# Patient Record
Sex: FEMALE | Race: BLACK OR AFRICAN AMERICAN | NOT HISPANIC OR LATINO | Employment: UNEMPLOYED | URBAN - METROPOLITAN AREA
[De-identification: names, ages, dates, MRNs, and addresses within clinical notes are randomized per-mention and may not be internally consistent; named-entity substitution may affect disease eponyms.]

---

## 2017-02-18 ENCOUNTER — HOSPITAL ENCOUNTER (EMERGENCY)
Facility: CLINIC | Age: 32
Discharge: HOME | End: 2017-02-18
Attending: EMERGENCY MEDICINE | Admitting: EMERGENCY MEDICINE
Payer: MEDICAID

## 2017-02-18 ENCOUNTER — APPOINTMENT (OUTPATIENT)
Dept: GENERAL RADIOLOGY | Facility: CLINIC | Age: 32
End: 2017-02-18
Attending: EMERGENCY MEDICINE
Payer: MEDICAID

## 2017-02-18 VITALS
SYSTOLIC BLOOD PRESSURE: 130 MMHG | TEMPERATURE: 98.7 F | DIASTOLIC BLOOD PRESSURE: 72 MMHG | HEART RATE: 72 BPM | RESPIRATION RATE: 18 BRPM | OXYGEN SATURATION: 97 %

## 2017-02-18 DIAGNOSIS — J45.901 REACTIVE AIRWAY DISEASE, UNSPECIFIED ASTHMA SEVERITY, WITH ACUTE EXACERBATION: ICD-10-CM

## 2017-02-18 DIAGNOSIS — J20.9 ACUTE BRONCHITIS, UNSPECIFIED ORGANISM: ICD-10-CM

## 2017-02-18 LAB
FLUAV+FLUBV AG SPEC QL: NEGATIVE
FLUAV+FLUBV AG SPEC QL: NORMAL
SPECIMEN SOURCE: NORMAL

## 2017-02-18 PROCEDURE — 40000275 ZZH STATISTIC RCP TIME EA 10 MIN

## 2017-02-18 PROCEDURE — 25000125 ZZHC RX 250: Performed by: EMERGENCY MEDICINE

## 2017-02-18 PROCEDURE — 87804 INFLUENZA ASSAY W/OPTIC: CPT | Performed by: EMERGENCY MEDICINE

## 2017-02-18 PROCEDURE — 94640 AIRWAY INHALATION TREATMENT: CPT

## 2017-02-18 PROCEDURE — 99284 EMERGENCY DEPT VISIT MOD MDM: CPT | Mod: 25

## 2017-02-18 PROCEDURE — 71020 XR CHEST 2 VW: CPT

## 2017-02-18 RX ORDER — PREDNISONE 20 MG/1
TABLET ORAL
Qty: 10 TABLET | Refills: 0 | Status: ON HOLD | OUTPATIENT
Start: 2017-02-19 | End: 2018-04-28

## 2017-02-18 RX ORDER — IPRATROPIUM BROMIDE AND ALBUTEROL SULFATE 2.5; .5 MG/3ML; MG/3ML
6 SOLUTION RESPIRATORY (INHALATION) ONCE
Status: COMPLETED | OUTPATIENT
Start: 2017-02-18 | End: 2017-02-18

## 2017-02-18 RX ORDER — ALBUTEROL SULFATE 90 UG/1
2 AEROSOL, METERED RESPIRATORY (INHALATION) EVERY 4 HOURS PRN
Qty: 1 INHALER | Refills: 0 | Status: SHIPPED | OUTPATIENT
Start: 2017-02-18 | End: 2019-11-07

## 2017-02-18 RX ORDER — PREDNISONE 20 MG/1
60 TABLET ORAL ONCE
Status: COMPLETED | OUTPATIENT
Start: 2017-02-18 | End: 2017-02-18

## 2017-02-18 RX ADMIN — IPRATROPIUM BROMIDE AND ALBUTEROL SULFATE 6 ML: .5; 3 SOLUTION RESPIRATORY (INHALATION) at 08:10

## 2017-02-18 RX ADMIN — PREDNISONE 60 MG: 20 TABLET ORAL at 08:02

## 2017-02-18 ASSESSMENT — ENCOUNTER SYMPTOMS
COUGH: 1
SHORTNESS OF BREATH: 1
WHEEZING: 1

## 2017-02-18 NOTE — ED AVS SNAPSHOT
[facility] Emergency Department    [address]    [facility] [address]    Phone:  [phone]    Fax:  [phone]                                       Tea Miles   MRN: [id number]    Department:  [facility] Emergency Department   Date of Visit:  2/18/2017           After Visit Summary Signature Page     I have received my discharge instructions, and my questions have been answered. I have discussed any challenges I see with this plan with the nurse or doctor.    ..........................................................................................................................................  Patient/Patient Representative Signature      ..........................................................................................................................................  Patient Representative Print Name and Relationship to Patient    ..................................................               ................................................  Date                                            Time    ..........................................................................................................................................  Reviewed by Signature/Title    ...................................................              ..............................................  Date                                                            Time

## 2017-02-18 NOTE — ED AVS SNAPSHOT
[facility] Emergency Department    [address] [place] [address]    Phone:  [phone]    Fax:  [phone]                                       Tea Miles   MRN: [id number]    Department:  [facility] Emergency Department   Date of Visit:  2/18/2017           Patient Information     Date Of Birth          1985        Your diagnoses for this visit were:     Acute bronchitis, unspecified organism     Reactive airway disease, unspecified asthma severity, with acute exacerbation        You were seen by Aaron Andrew MD.      Follow-up Information     Follow up with Clinic, [facility]. Schedule an appointment as soon as possible for a visit in 2 days.    Contact information:    [address] [facility] [address]  [phone]          Discharge Instructions       Discharge Instructions  Bronchitis, Pneumonia, Bronchospasm    You were seen today for a chest infection or inflammation. If your doctor decided this was due to a bacterial infection, you may need an antibiotic. Sometimes these are caused by a virus, and then an antibiotic will not help.     Return to the Emergency Department if:    Your breathing gets much worse.    You are very weak, or feel much more ill.    You develop new symptoms, such as chest pain.    You cough up blood.    You are vomiting enough that you can t keep fluids or your medicine down.    What can I do to help myself?    Fill any prescriptions the doctor gave you and take them right away--especially antibiotics. Be sure to finish the whole antibiotic prescription.    You may be given a prescription for an inhaler, which can help loosen tight air passages.  Use this as needed, but not more often than directed. Inhalers work much better when used with a spacer.     You may be given a prescription for a steroid to reduce inflammation. Used long-term, these can have many serious side effects, but for short  "courses these do not happen. You may notice restlessness or increased appetite.        You may use non-prescription cough or cold medicines. Cough medicines may help, but don t make the cough go away completely.     Avoid smoke, because this can make your symptoms worse. If you smoke, this may be a good time to quit! Consider using nicotine lozenges, gum, or patches to reduce cravings.     If you have a fever, Tylenol  (acetaminophen), Motrin  (ibuprofen), or Advil  (ibuprofen) may help bring fever down and may help you feel more comfortable. Be sure to read and follow the package directions, and ask your doctor if you have questions.    Be sure to get your flu shot each year.  The pneumonia shot can help prevent pneumonia.  Probiotics: If you have been given an antibiotic, you may want to also take a probiotic pill or eat yogurt with live cultures. Probiotics have \"good bacteria\" to help your intestines stay healthy. Studies have shown that probiotics help prevent diarrhea and other intestine problems (including C. diff infection) when you take antibiotics. You can buy these without a prescription in the pharmacy section of the store.     If your doctor has told you to follow-up at your clinic, be sure to call right away and go to your appointment.  If there is any problem with keeping your appointment, call your doctor or return to the Emergency Department.    If you were given a prescription for medicine here today, be sure to read all of the information (including the package insert) that comes with your prescription.  This will include important information about the medicine, its side effects, and any warnings that you need to know about.  The pharmacist who fills the prescription can provide more information and answer questions you may have about the medicine.  If you have questions or concerns that the pharmacist cannot address, please call or return to the Emergency Department.         Discharge " References/Attachments     BRONCHOSPASM (ADULT) (ENGLISH)      24 Hour Appointment Hotline       To make an appointment at any [facility] call [phone] ([phone]). If you don't have a family doctor or clinic, we will help you find one. [facility] clinics are conveniently located to serve the needs of you and your family.             Review of your medicines      START taking        Dose / Directions Last dose taken    predniSONE 20 MG tablet   Commonly known as:  DELTASONE   Quantity:  10 tablet   Start taking on:  2/19/2017        Take two tablets (= 40mg) each day for 5 (five) days   Refills:  0          CONTINUE these medicines which may have CHANGED, or have new prescriptions. If we are uncertain of the size of tablets/capsules you have at home, strength may be listed as something that might have changed.        Dose / Directions Last dose taken    * albuterol 108 (90 BASE) MCG/ACT Inhaler   Commonly known as:  PROAIR HFA/PROVENTIL HFA/VENTOLIN HFA   Dose:  1-2 puff   What changed:  Another medication with the same name was added. Make sure you understand how and when to take each.   Quantity:  1 Inhaler        Inhale 1-2 puffs into the lungs every 4 hours as needed for shortness of breath / dyspnea or wheezing   Refills:  0        * albuterol 108 (90 BASE) MCG/ACT Inhaler   Commonly known as:  PROAIR HFA/PROVENTIL HFA/VENTOLIN HFA   Dose:  2 puff   What changed:  You were already taking a medication with the same name, and this prescription was added. Make sure you understand how and when to take each.   Quantity:  1 Inhaler        Inhale 2 puffs into the lungs every 4 hours as needed for shortness of breath / dyspnea or wheezing   Refills:  0        * Notice:  This list has 2 medication(s) that are the same as other medications prescribed for you. Read the directions carefully, and ask your doctor or other care provider to review them with you.      Our records show that you are taking the  medicines listed below. If these are incorrect, please call your family doctor or clinic.        Dose / Directions Last dose taken    ferrous sulfate 142 (45 FE) MG Tbcr   Commonly known as:  SLO-FE   Dose:  142 mg   Quantity:  30 tablet        Take 1 tablet (142 mg) by mouth daily   Refills:  prn        fluticasone 110 MCG/ACT Inhaler   Commonly known as:  FLOVENT HFA   Dose:  2 puff   Quantity:  1 Inhaler        Inhale 2 puffs into the lungs 2 times daily   Refills:  1        GABAPENTIN PO        Refills:  0        ibuprofen 600 MG tablet   Commonly known as:  ADVIL/MOTRIN   Dose:  600 mg   Quantity:  30 tablet        Take 1 tablet (600 mg) by mouth every 8 hours as needed for moderate pain   Refills:  0        OMEPRAZOLE PO        Refills:  0        PRENATAL VITAMINS PO   Dose:  1 tablet        Take 1 tablet by mouth daily.   Refills:  0        ZOFRAN PO        Refills:  0                Prescriptions were sent or printed at these locations (2 Prescriptions)                   Other Prescriptions                Printed at Department/Unit printer (2 of 2)         predniSONE (DELTASONE) 20 MG tablet               albuterol (PROAIR HFA/PROVENTIL HFA/VENTOLIN HFA) 108 (90 BASE) MCG/ACT Inhaler                Procedures and tests performed during your visit     Chest XR,  PA & LAT    Influenza A/B antigen      Orders Needing Specimen Collection     None      Pending Results     No orders found from 2/16/2017 to 2/19/2017.            Pending Culture Results     No orders found from 2/16/2017 to 2/19/2017.             Test Results from your hospital stay     2/18/2017  8:49 AM - Interface, [employer] Results      Component Results     Component Value Ref Range & Units Status    Influenza A/B Agn Specimen Nasal  Final    Influenza A Negative NEG Final    Influenza B  NEG Final    Negative   Test results must be correlated with clinical data. If necessary, results   should be confirmed by a molecular assay or viral  culture.           2/18/2017  8:43 AM - Interface, [employer] Ib      Narrative     XR CHEST 2 VW 2/18/2017 8:38 AM     HISTORY: cough, dyspnea        Impression     IMPRESSION: Negative exam.    ESTHER ARGUETA MD                Clinical Quality Measure: Blood Pressure Screening     Your blood pressure was checked while you were in the emergency department today. The last reading we obtained was  BP: 135/74 . Please read the guidelines below about what these numbers mean and what you should do about them.  If your systolic blood pressure (the top number) is less than 120 and your diastolic blood pressure (the bottom number) is less than 80, then your blood pressure is normal. There is nothing more that you need to do about it.  If your systolic blood pressure (the top number) is 120-139 or your diastolic blood pressure (the bottom number) is 80-89, your blood pressure may be higher than it should be. You should have your blood pressure rechecked within a year by a primary care provider.  If your systolic blood pressure (the top number) is 140 or greater or your diastolic blood pressure (the bottom number) is 90 or greater, you may have high blood pressure. High blood pressure is treatable, but if left untreated over time it can put you at risk for heart attack, stroke, or kidney failure. You should have your blood pressure rechecked by a primary care provider within the next 4 weeks.  If your provider in the emergency department today gave you specific instructions to follow-up with your doctor or provider even sooner than that, you should follow that instruction and not wait for up to 4 weeks for your follow-up visit.        Thank you for choosing [facility]       Thank you for choosing [facility] for your care. Our goal is always to provide you with excellent care. Hearing back from our patients is one way we can continue to improve our services. Please take a few minutes to complete the written survey that you may  "receive in the mail after you visit with us. Thank you!        [employer] Information     [employer] lets you send messages to your doctor, view your test results, renew your prescriptions, schedule appointments and more. To sign up, go to [email or web address] . Click on \"Log in\" on the left side of the screen, which will take you to the Welcome page. Then click on \"Sign up Now\" on the right side of the page.     You will be asked to enter the access code listed below, as well as some personal information. Please follow the directions to create your username and password.     Your access code is: [email or web address]  Expires: 2017  9:15 AM     Your access code will  in 90 days. If you need help or a new code, please call your [place] clinic or [phone].        Care EveryWhere ID     This is your Care EveryWhere ID. This could be used by other organizations to access your [place] medical records  [id number]        After Visit Summary       This is your record. Keep this with you and show to your community pharmacist(s) and doctor(s) at your next visit.                  "

## 2017-02-18 NOTE — ED NOTES
Patient presents with a cough which started this morning. She states that she is having SOB as well. Patient was given one Duo neb in route. Denies any fevers or pain. ABC intact without need for intervention.

## 2017-02-18 NOTE — ED NOTES
Bed: ED15  Expected date: 2/18/17  Expected time: 7:41 AM  Means of arrival: Ambulance  Comments:  HE- 32y F, cough and wheezing, duo neb given

## 2017-07-31 ENCOUNTER — OFFICE VISIT (OUTPATIENT)
Dept: URGENT CARE | Facility: URGENT CARE | Age: 32
End: 2017-07-31
Payer: COMMERCIAL

## 2017-07-31 VITALS
DIASTOLIC BLOOD PRESSURE: 58 MMHG | BODY MASS INDEX: 26.22 KG/M2 | RESPIRATION RATE: 12 BRPM | OXYGEN SATURATION: 100 % | TEMPERATURE: 98.8 F | SYSTOLIC BLOOD PRESSURE: 100 MMHG | WEIGHT: 160 LBS | HEART RATE: 64 BPM

## 2017-07-31 DIAGNOSIS — R30.0 DYSURIA: ICD-10-CM

## 2017-07-31 DIAGNOSIS — B96.89 BV (BACTERIAL VAGINOSIS): Primary | ICD-10-CM

## 2017-07-31 DIAGNOSIS — N76.0 BV (BACTERIAL VAGINOSIS): Primary | ICD-10-CM

## 2017-07-31 DIAGNOSIS — N92.6 LATE MENSES: ICD-10-CM

## 2017-07-31 LAB
ALBUMIN UR-MCNC: NEGATIVE MG/DL
APPEARANCE UR: CLEAR
BETA HCG QUAL IFA URINE: NEGATIVE
BILIRUB UR QL STRIP: NEGATIVE
COLOR UR AUTO: YELLOW
GLUCOSE UR STRIP-MCNC: NEGATIVE MG/DL
HGB UR QL STRIP: NEGATIVE
KETONES UR STRIP-MCNC: NEGATIVE MG/DL
LEUKOCYTE ESTERASE UR QL STRIP: NEGATIVE
MICRO REPORT STATUS: ABNORMAL
NITRATE UR QL: NEGATIVE
PH UR STRIP: 7 PH (ref 5–7)
SP GR UR STRIP: 1.01 (ref 1–1.03)
SPECIMEN SOURCE: ABNORMAL
URN SPEC COLLECT METH UR: NORMAL
UROBILINOGEN UR STRIP-ACNC: 0.2 EU/DL (ref 0.2–1)
WET PREP SPEC: ABNORMAL

## 2017-07-31 PROCEDURE — 87210 SMEAR WET MOUNT SALINE/INK: CPT | Performed by: PHYSICIAN ASSISTANT

## 2017-07-31 PROCEDURE — 84703 CHORIONIC GONADOTROPIN ASSAY: CPT | Performed by: PHYSICIAN ASSISTANT

## 2017-07-31 PROCEDURE — 99213 OFFICE O/P EST LOW 20 MIN: CPT | Performed by: PHYSICIAN ASSISTANT

## 2017-07-31 PROCEDURE — 81003 URINALYSIS AUTO W/O SCOPE: CPT | Performed by: PHYSICIAN ASSISTANT

## 2017-07-31 RX ORDER — METRONIDAZOLE 500 MG/1
500 TABLET ORAL 2 TIMES DAILY
Qty: 14 TABLET | Refills: 0 | Status: ON HOLD | OUTPATIENT
Start: 2017-07-31 | End: 2018-04-28

## 2017-07-31 NOTE — MR AVS SNAPSHOT
"              After Visit Summary   2017    Kayla Howell    MRN: [id number]           Patient Information     Date Of Birth          1985        Visit Information        Provider Department      2017 2:50 PM Regina García PA-C [facility]        Today's Diagnoses     BV (bacterial vaginosis)    -  1    Late menses        Dysuria           Follow-ups after your visit        Who to contact     If you have questions or need follow up information about today's clinic visit or your schedule please contact [facility] directly at [phone].  Normal or non-critical lab and imaging results will be communicated to you by [employer], letter or phone within 4 business days after the clinic has received the results. If you do not hear from us within 7 days, please contact the clinic through [employer] or phone. If you have a critical or abnormal lab result, we will notify you by phone as soon as possible.  Submit refill requests through [employer] or call your pharmacy and they will forward the refill request to us. Please allow 3 business days for your refill to be completed.          Additional Information About Your Visit        MyChart Information     [employer] lets you send messages to your doctor, view your test results, renew your prescriptions, schedule appointments and more. To sign up, go to [email or web address] . Click on \"Log in\" on the left side of the screen, which will take you to the Welcome page. Then click on \"Sign up Now\" on the right side of the page.     You will be asked to enter the access code listed below, as well as some personal information. Please follow the directions to create your username and password.     Your access code is: [email or web address]  Expires: 2017  4:02 PM     Your access code will  in 90 days. If you need help or a new code, please call your [place] clinic or [phone].        Care EveryWhere ID     This is your Care " EveryWhere ID. This could be used by other organizations to access your [place] medical records  [id number]        Your Vitals Were     Pulse Temperature Respirations Pulse Oximetry BMI (Body Mass Index)       64 98.8  F (37.1  C) (Oral) 12 100% 26.22 kg/m2        Blood Pressure from Last 3 Encounters:   07/31/17 100/58   02/18/17 130/72   11/09/16 100/70    Weight from Last 3 Encounters:   07/31/17 160 lb (72.6 kg)   11/09/16 206 lb 11.2 oz (93.8 kg)   03/07/16 207 lb (93.9 kg)              We Performed the Following     *UA reflex to Microscopic and Culture ([place] and [facility] (except [place] and [place])     Beta HCG qual IFA urine     Wet prep          Today's Medication Changes          These changes are accurate as of: 7/31/17 11:59 PM.  If you have any questions, ask your nurse or doctor.               Start taking these medicines.        Dose/Directions    metroNIDAZOLE 500 MG tablet   Commonly known as:  FLAGYL   Used for:  BV (bacterial vaginosis)   Started by:  Regina García PA-C        Dose:  500 mg   Take 1 tablet (500 mg) by mouth 2 times daily   Quantity:  14 tablet   Refills:  0            Where to get your medicines      These medications were sent to [facility] #[id number] - [place], [place] - [address] [place] [address] AT [address] [place], [place] [address]     Phone:  [phone]     metroNIDAZOLE 500 MG tablet                Primary Care Provider Office Phone # Fax #    Reba [facility] [phone] [address] [facility] [address]        Equal Access to Services     PETRA WADE AH: Hadmariajose santiago Soshital, waaxda luqadaha, qaybta kaalmada andrew, cristina paredes. So [facility] [phone].    ATENCIÓN: Si habla español, tiene a porter disposición servicios gratuitos de asistencia lingüística. Negrito mak [phone].    We comply with applicable federal civil rights laws and [place]  laws. We do not discriminate on the basis of race, color, national origin, age, disability sex, sexual orientation or gender identity.            Thank you!     Thank you for choosing [facility]  for your care. Our goal is always to provide you with excellent care. Hearing back from our patients is one way we can continue to improve our services. Please take a few minutes to complete the written survey that you may receive in the mail after your visit with us. Thank you!             Your Updated Medication List - Protect others around you: Learn how to safely use, store and throw away your medicines at www.disposemymeds.org.          This list is accurate as of: 7/31/17 11:59 PM.  Always use your most recent med list.                   Brand Name Dispense Instructions for use Diagnosis    * albuterol 108 (90 BASE) MCG/ACT Inhaler    PROAIR HFA/PROVENTIL HFA/VENTOLIN HFA    1 Inhaler    Inhale 1-2 puffs into the lungs every 4 hours as needed for shortness of breath / dyspnea or wheezing    Bronchitis       * albuterol 108 (90 BASE) MCG/ACT Inhaler    PROAIR HFA/PROVENTIL HFA/VENTOLIN HFA    1 Inhaler    Inhale 2 puffs into the lungs every 4 hours as needed for shortness of breath / dyspnea or wheezing        ferrous sulfate 142 (45 FE) MG Tbcr    SLO-FE    30 tablet    Take 1 tablet (142 mg) by mouth daily    LISA (iron deficiency anaemia)       fluticasone 110 MCG/ACT Inhaler    FLOVENT HFA    1 Inhaler    Inhale 2 puffs into the lungs 2 times daily    Acute bronchitis       GABAPENTIN PO           ibuprofen 600 MG tablet    ADVIL/MOTRIN    30 tablet    Take 1 tablet (600 mg) by mouth every 8 hours as needed for moderate pain        metroNIDAZOLE 500 MG tablet    FLAGYL    14 tablet    Take 1 tablet (500 mg) by mouth 2 times daily    BV (bacterial vaginosis)       OMEPRAZOLE PO           predniSONE 20 MG tablet    DELTASONE    10 tablet    Take two tablets (= 40mg) each day for 5 (five) days         PRENATAL VITAMINS PO      Take 1 tablet by mouth daily.        ZOFRAN PO           * Notice:  This list has 2 medication(s) that are the same as other medications prescribed for you. Read the directions carefully, and ask your doctor or other care provider to review them with you.

## 2017-07-31 NOTE — NURSING NOTE
"Chief Complaint   Patient presents with     Urgent Care     Vomiting     Pt has been vomiting x 1 week approx 3/day.  She thinks she has possibley had a fever off and on.  No period x 2 months and may be pregnant.       Initial /58 (BP Location: Right arm, Patient Position: Sitting, Cuff Size: Adult Regular)  Pulse 64  Temp 98.8  F (37.1  C) (Oral)  Resp 12  Wt 160 lb (72.6 kg)  SpO2 100%  BMI 26.22 kg/m2 Estimated body mass index is 26.22 kg/(m^2) as calculated from the following:    Height as of 3/7/16: 5' 5.5\" (1.664 m).    Weight as of this encounter: 160 lb (72.6 kg)..  BP completed using cuff size: regular  Kayleen Marques R.N.    "

## 2017-08-29 NOTE — PROGRESS NOTES
SUBJECTIVE:   32 year old female complains of clear vaginal discharge for 1 weeks.  Denies abnormal vaginal bleeding or significant pelvic pain or  fever. No UTI symptoms. Denies history of known exposure to STD. Denies dyspareunia.  States that has been vomiting on and off for 1 week and wonders about fever.  Lower abdominal pressure but no pain . Denies spotting.  States that no menstrual cycle for 2 months and wonders if pregnant.  Eating and drinking well and normal urine output.      Current Outpatient Prescriptions   Medication     metroNIDAZOLE (FLAGYL) 500 MG tablet     GABAPENTIN PO     predniSONE (DELTASONE) 20 MG tablet     albuterol (PROAIR HFA/PROVENTIL HFA/VENTOLIN HFA) 108 (90 BASE) MCG/ACT Inhaler     Ondansetron HCl (ZOFRAN PO)     ibuprofen (ADVIL,MOTRIN) 600 MG tablet     OMEPRAZOLE PO     ferrous sulfate (SLO-FE) 142 (45 FE) MG TBCR     albuterol (PROAIR HFA, PROVENTIL HFA, VENTOLIN HFA) 108 (90 BASE) MCG/ACT inhaler     fluticasone (FLOVENT HFA) 110 MCG/ACT inhaler     PRENATAL VITAMINS PO     No current facility-administered medications for this visit.      She is a non smoker    Has other children.      No history of abdominal issues or surgeries.        LMP - June 10th    OBJECTIVE:   She appears well, afebrile.  Abdomen: benign, soft, nontender, no masses.  Pelvic Exam: normal vagina and vulva, uterus normal size anteverted, adenxa normal in size without tenderness.    Results for orders placed or performed in visit on 07/31/17   Beta HCG qual IFA urine   Result Value Ref Range    Beta HCG Qual IFA Urine Negative NEG   *UA reflex to Microscopic and Culture ([place] and [facility] (except [place] and [place])   Result Value Ref Range    Color Urine Yellow     Appearance Urine Clear     Glucose Urine Negative NEG mg/dL    Bilirubin Urine Negative NEG    Ketones Urine Negative NEG mg/dL    Specific Gravity Urine 1.015 1.003 - 1.035    Blood Urine Negative NEG    pH Urine 7.0 5.0 - 7.0  pH    Protein Albumin Urine Negative NEG mg/dL    Urobilinogen Urine 0.2 0.2 - 1.0 EU/dL    Nitrite Urine Negative NEG    Leukocyte Esterase Urine Negative NEG    Source Midstream Urine    Wet prep   Result Value Ref Range    Specimen Description Vagina     Wet Prep (A)      No Trichomonas seen  No yeast seen  Clue cells seen      Micro Report Status FINAL 07/31/2017          assessment/plan:  (N76.0,  B96.89) BV (bacterial vaginosis)  (primary encounter diagnosis)  Comment:   Plan: metroNIDAZOLE (FLAGYL) 500 MG tablet        Med as directed and OTC med for sx relief.  Nature of BV discussed and reassured that not STD.      (N92.6) Late menses  Comment:   Plan: Beta HCG qual IFA urine        Negative test.  If fails to get menstrual cycle then to FU with OB/GYN.  FU with PCP as needed.      (R30.0) Dysuria  Comment:   Plan: *UA reflex to Microscopic and Culture ([place]         and [facility] ([facility] and         [place]), Wet prep         Negative

## 2017-11-03 ENCOUNTER — OFFICE VISIT (OUTPATIENT)
Dept: URGENT CARE | Facility: URGENT CARE | Age: 32
End: 2017-11-03
Payer: COMMERCIAL

## 2017-11-03 VITALS
TEMPERATURE: 97.8 F | DIASTOLIC BLOOD PRESSURE: 62 MMHG | SYSTOLIC BLOOD PRESSURE: 100 MMHG | WEIGHT: 156.9 LBS | OXYGEN SATURATION: 98 % | BODY MASS INDEX: 25.71 KG/M2 | HEART RATE: 69 BPM

## 2017-11-03 DIAGNOSIS — Z3A.01 LESS THAN 8 WEEKS GESTATION OF PREGNANCY: Primary | ICD-10-CM

## 2017-11-03 DIAGNOSIS — R11.2 INTRACTABLE VOMITING WITH NAUSEA, UNSPECIFIED VOMITING TYPE: ICD-10-CM

## 2017-11-03 LAB
ALBUMIN UR-MCNC: NEGATIVE MG/DL
APPEARANCE UR: CLEAR
BETA HCG QUAL IFA URINE: POSITIVE
BILIRUB UR QL STRIP: NEGATIVE
COLOR UR AUTO: YELLOW
FLUAV+FLUBV AG SPEC QL: NEGATIVE
FLUAV+FLUBV AG SPEC QL: NEGATIVE
GLUCOSE UR STRIP-MCNC: NEGATIVE MG/DL
HGB UR QL STRIP: NEGATIVE
KETONES UR STRIP-MCNC: ABNORMAL MG/DL
LEUKOCYTE ESTERASE UR QL STRIP: NEGATIVE
NITRATE UR QL: NEGATIVE
PH UR STRIP: 7 PH (ref 5–7)
RBC #/AREA URNS AUTO: ABNORMAL /HPF
SOURCE: ABNORMAL
SP GR UR STRIP: 1.02 (ref 1–1.03)
SPECIMEN SOURCE: NORMAL
UROBILINOGEN UR STRIP-ACNC: 0.2 EU/DL (ref 0.2–1)
WBC #/AREA URNS AUTO: ABNORMAL /HPF

## 2017-11-03 PROCEDURE — 99213 OFFICE O/P EST LOW 20 MIN: CPT | Performed by: PHYSICIAN ASSISTANT

## 2017-11-03 PROCEDURE — 87804 INFLUENZA ASSAY W/OPTIC: CPT | Performed by: PHYSICIAN ASSISTANT

## 2017-11-03 PROCEDURE — 84703 CHORIONIC GONADOTROPIN ASSAY: CPT | Performed by: PHYSICIAN ASSISTANT

## 2017-11-03 PROCEDURE — 81001 URINALYSIS AUTO W/SCOPE: CPT | Performed by: PHYSICIAN ASSISTANT

## 2017-11-03 NOTE — MR AVS SNAPSHOT
"              After Visit Summary   11/3/2017    Kayla Howell    MRN: [id number]           Patient Information     Date Of Birth          1985        Visit Information        Provider Department      11/3/2017 2:15 PM Emmanuel Greene PA-C [facility]        Today's Diagnoses     Less than 8 weeks gestation of pregnancy    -  1    Intractable vomiting with nausea, unspecified vomiting type           Follow-ups after your visit        Who to contact     If you have questions or need follow up information about today's clinic visit or your schedule please contact [facility] directly at [phone].  Normal or non-critical lab and imaging results will be communicated to you by Gumhousehart, letter or phone within 4 business days after the clinic has received the results. If you do not hear from us within 7 days, please contact the clinic through Gumhousehart or phone. If you have a critical or abnormal lab result, we will notify you by phone as soon as possible.  Submit refill requests through [facility] or call your pharmacy and they will forward the refill request to us. Please allow 3 business days for your refill to be completed.          Additional Information About Your Visit        MyChart Information     [facility] lets you send messages to your doctor, view your test results, renew your prescriptions, schedule appointments and more. To sign up, go to [email or web address] . Click on \"Log in\" on the left side of the screen, which will take you to the Welcome page. Then click on \"Sign up Now\" on the right side of the page.     You will be asked to enter the access code listed below, as well as some personal information. Please follow the directions to create your username and password.     Your access code is: [email or web address]  Expires: 2017  4:02 PM     Your access code will  in 90 days. If you need help or a new code, please call your [place] clinic or [phone].      "   Care EveryWhere ID     This is your Care EveryWhere ID. This could be used by other organizations to access your [place] medical records  [id number]        Your Vitals Were     Pulse Temperature Pulse Oximetry BMI (Body Mass Index)          69 97.8  F (36.6  C) (Oral) 98% 25.71 kg/m2         Blood Pressure from Last 3 Encounters:   11/03/17 100/62   07/31/17 100/58   02/18/17 130/72    Weight from Last 3 Encounters:   11/03/17 156 lb 14.4 oz (71.2 kg)   07/31/17 160 lb (72.6 kg)   11/09/16 206 lb 11.2 oz (93.8 kg)              We Performed the Following     Beta HCG qual IFA urine - [facility] and [place]     Influenza A/B antigen     UA with Microscopic reflex to Culture        Primary Care Provider Office Phone # Fax #    [facility] [phone] [facility] [address]        Equal Access to Services     JAIME WADE : Hadii aad ku hadasho Soomaali, waaxda luqadaha, qaybta kaalmada adeegyada, waxay haileyin hayrajeshn sara lucas . So [facility] [phone].    ATENCIÓN: Si habla español, tiene a porter disposición servicios gratuitos de asistencia lingüística. Llame al [phone].    We comply with applicable federal civil rights laws and [place] laws. We do not discriminate on the basis of race, color, national origin, age, disability, sex, sexual orientation, or gender identity.            Thank you!     Thank you for choosing [facility]  for your care. Our goal is always to provide you with excellent care. Hearing back from our patients is one way we can continue to improve our services. Please take a few minutes to complete the written survey that you may receive in the mail after your visit with us. Thank you!             Your Updated Medication List - Protect others around you: Learn how to safely use, store and throw away your medicines at www.disposemymeds.org.          This list is accurate as of: 11/3/17  4:18 PM.  Always use your  most recent med list.                   Brand Name Dispense Instructions for use Diagnosis    * albuterol 108 (90 BASE) MCG/ACT Inhaler    PROAIR HFA/PROVENTIL HFA/VENTOLIN HFA    1 Inhaler    Inhale 1-2 puffs into the lungs every 4 hours as needed for shortness of breath / dyspnea or wheezing    Bronchitis       * albuterol 108 (90 BASE) MCG/ACT Inhaler    PROAIR HFA/PROVENTIL HFA/VENTOLIN HFA    1 Inhaler    Inhale 2 puffs into the lungs every 4 hours as needed for shortness of breath / dyspnea or wheezing        ferrous sulfate 142 (45 FE) MG Tbcr    SLO-FE    30 tablet    Take 1 tablet (142 mg) by mouth daily    LISA (iron deficiency anaemia)       fluticasone 110 MCG/ACT Inhaler    FLOVENT HFA    1 Inhaler    Inhale 2 puffs into the lungs 2 times daily    Acute bronchitis       GABAPENTIN PO           ibuprofen 600 MG tablet    ADVIL/MOTRIN    30 tablet    Take 1 tablet (600 mg) by mouth every 8 hours as needed for moderate pain        metroNIDAZOLE 500 MG tablet    FLAGYL    14 tablet    Take 1 tablet (500 mg) by mouth 2 times daily    BV (bacterial vaginosis)       OMEPRAZOLE PO           predniSONE 20 MG tablet    DELTASONE    10 tablet    Take two tablets (= 40mg) each day for 5 (five) days        PRENATAL VITAMINS PO      Take 1 tablet by mouth daily.        ZOFRAN PO           * Notice:  This list has 2 medication(s) that are the same as other medications prescribed for you. Read the directions carefully, and ask your doctor or other care provider to review them with you.

## 2017-11-03 NOTE — PROGRESS NOTES
SUBJECTIVE:   Kayla Howell is a 32 year old female presenting with a chief complaint of chills, nausea and vomiting.  Onset of symptoms was 1 week(s) ago.  Course of illness is same.    Severity moderate  Current and Associated symptoms: last menstral period September 18, 2017  Treatment measures tried include None tried.  Predisposing factors include None.    She is taking prenatal vitamins    History reviewed. No pertinent past medical history.  Current Outpatient Prescriptions   Medication Sig Dispense Refill     GABAPENTIN PO        predniSONE (DELTASONE) 20 MG tablet Take two tablets (= 40mg) each day for 5 (five) days 10 tablet 0     ibuprofen (ADVIL,MOTRIN) 600 MG tablet Take 1 tablet (600 mg) by mouth every 8 hours as needed for moderate pain 30 tablet 0     OMEPRAZOLE PO        ferrous sulfate (SLO-FE) 142 (45 FE) MG TBCR Take 1 tablet (142 mg) by mouth daily 30 tablet prn     albuterol (PROAIR HFA, PROVENTIL HFA, VENTOLIN HFA) 108 (90 BASE) MCG/ACT inhaler Inhale 1-2 puffs into the lungs every 4 hours as needed for shortness of breath / dyspnea or wheezing 1 Inhaler 0     fluticasone (FLOVENT HFA) 110 MCG/ACT inhaler Inhale 2 puffs into the lungs 2 times daily 1 Inhaler 1     PRENATAL VITAMINS PO Take 1 tablet by mouth daily.       metroNIDAZOLE (FLAGYL) 500 MG tablet Take 1 tablet (500 mg) by mouth 2 times daily (Patient not taking: Reported on 11/3/2017) 14 tablet 0     albuterol (PROAIR HFA/PROVENTIL HFA/VENTOLIN HFA) 108 (90 BASE) MCG/ACT Inhaler Inhale 2 puffs into the lungs every 4 hours as needed for shortness of breath / dyspnea or wheezing (Patient not taking: Reported on 11/3/2017) 1 Inhaler 0     Ondansetron HCl (ZOFRAN PO)        Social History   Substance Use Topics     Smoking status: Never Smoker     Smokeless tobacco: Never Used     Alcohol use No       ROS:  Review of systems negative except as stated above.    OBJECTIVE:  /62 (BP Location: Right arm, Patient Position: Chair,  Cuff Size: Adult Regular)  Pulse 69  Temp 97.8  F (36.6  C) (Oral)  Wt 156 lb 14.4 oz (71.2 kg)  SpO2 98%  BMI 25.71 kg/m2  GENERAL APPEARANCE: healthy, alert and no distress  EYES: EOMI,  PERRL, conjunctiva clear  HENT: ear canals and TM's normal.  Nose and mouth without ulcers, erythema or lesions  NECK: supple, nontender, no lymphadenopathy  RESP: lungs clear to auscultation - no rales, rhonchi or wheezes  CV: regular rates and rhythm, normal S1 S2, no murmur noted  ABDOMEN:  soft, nontender  NEURO: Normal strength and tone, sensory exam grossly normal,  normal speech and mentation  SKIN: no suspicious lesions or rashes    ASSESSMENT:  Pregnancy 6 & 4/7 weeks pregnant YAN 6/25/2018    PLAN:  Continue taking prenatal vitamins  Make appointment for intake with OBGYN provider.  Follow up with Primary Care Provider if any complications or sequelae present.

## 2017-11-03 NOTE — NURSING NOTE
"Chief Complaint   Patient presents with     Urgent Care     Vomiting     vomiting x 1 week TID, with fever with sweats and chills.     Amenorrhea     Pt states she has not had a period in the past 45 days.       Initial /62 (BP Location: Right arm, Patient Position: Chair, Cuff Size: Adult Regular)  Pulse 69  Temp 97.8  F (36.6  C) (Oral)  Wt 156 lb 14.4 oz (71.2 kg)  SpO2 98%  BMI 25.71 kg/m2 Estimated body mass index is 25.71 kg/(m^2) as calculated from the following:    Height as of 3/7/16: 5' 5.5\" (1.664 m).    Weight as of this encounter: 156 lb 14.4 oz (71.2 kg).  Medication Reconciliation: unable or not appropriate to perform   Blayne Araujo Medical Assistant      "

## 2018-04-28 ENCOUNTER — HOSPITAL ENCOUNTER (OUTPATIENT)
Facility: CLINIC | Age: 33
Discharge: HOME | End: 2018-04-28
Attending: OBSTETRICS & GYNECOLOGY | Admitting: OBSTETRICS & GYNECOLOGY
Payer: COMMERCIAL

## 2018-04-28 VITALS
WEIGHT: 183 LBS | HEIGHT: 65 IN | BODY MASS INDEX: 30.49 KG/M2 | TEMPERATURE: 97.9 F | DIASTOLIC BLOOD PRESSURE: 63 MMHG | SYSTOLIC BLOOD PRESSURE: 105 MMHG

## 2018-04-28 PROBLEM — Z36.89 ENCOUNTER FOR TRIAGE IN PREGNANT PATIENT: Status: ACTIVE | Noted: 2018-04-28

## 2018-04-28 LAB
ALBUMIN UR-MCNC: NEGATIVE MG/DL
APPEARANCE UR: CLEAR
BILIRUB UR QL STRIP: NEGATIVE
COLOR UR AUTO: YELLOW
GLUCOSE UR STRIP-MCNC: NEGATIVE MG/DL
HGB UR QL STRIP: NEGATIVE
KETONES UR STRIP-MCNC: NEGATIVE MG/DL
LEUKOCYTE ESTERASE UR QL STRIP: NEGATIVE
MUCOUS THREADS #/AREA URNS LPF: PRESENT /LPF
NITRATE UR QL: NEGATIVE
PH UR STRIP: 7 PH (ref 5–7)
RBC #/AREA URNS AUTO: 1 /HPF (ref 0–2)
SOURCE: ABNORMAL
SP GR UR STRIP: 1.01 (ref 1–1.03)
SQUAMOUS #/AREA URNS AUTO: 2 /HPF (ref 0–1)
UROBILINOGEN UR STRIP-MCNC: 0 MG/DL (ref 0–2)
WBC #/AREA URNS AUTO: 2 /HPF (ref 0–5)

## 2018-04-28 PROCEDURE — G0463 HOSPITAL OUTPT CLINIC VISIT: HCPCS

## 2018-04-28 PROCEDURE — 81001 URINALYSIS AUTO W/SCOPE: CPT | Performed by: OBSTETRICS & GYNECOLOGY

## 2018-04-28 NOTE — PROVIDER NOTIFICATION
04/28/18 1230   Provider Notification   Provider Name/Title Dr Lane   Method of Notification In Department   Notification Reason Status Update;Patient Arrived     Dr Lane updated re: arrival and c/o of pelvic pain. Denies LOF, bleeding, contractions or any other pain. Reports the pelvic pain started this weekend. Pt is feeling her baby move. MD states to send UA and check cervix.

## 2018-04-28 NOTE — PLAN OF CARE
Data: Patient assessed in the [facility] for pelvic pain.  Cervical exam closed.  Membranes intact.  Contractions none.  Action:  Presumed adequate fetal oxygenation documented (see flow record). Discharge instructions reviewed.  Patient instructed to report change in fetal movement, vaginal leaking of fluid or bleeding, abdominal pain, or any concerns related to the pregnancy to her nurse/physician.    Response: Orders to discharge home per Dr Lane.  Patient verbalized understanding of education and verbalized agreement with plan. Discharged to home at 1300.

## 2018-04-28 NOTE — PROVIDER NOTIFICATION
04/28/18 1300   Provider Notification   Provider Name/Title Dr Lane   Method of Notification At Bedside   Comments   Comments for TAYLA

## 2018-04-28 NOTE — PLAN OF CARE
Data: Patient presented to [facility]: 2018 11:43 AM.  Reason for maternal/fetal assessment is pelvic pain. Patient reports that she has had increased pressure the last couple days.  Patient is a .  Prenatal record reviewed. Pregnancy  has been complicated by has been uncomplicated.  Gestational Age 30w3d. VSS. Fetal movement present. Patient denies uterine contractions, leaking of vaginal fluid/rupture of membranes, vaginal bleeding, abdominal pain, pelvic pressure, nausea, vomiting, headache, visual disturbances, epigastric or URQ pain, significant edema. Support person is not present.   Action: Verbal consent for EFM. Triage assessment completed. Bill of rights reviewed.  Response: Patient verbalized agreement with plan. Will contact Dr Marly Lane with update and further orders.

## 2018-04-28 NOTE — IP AVS SNAPSHOT
[facility]    [address]    [facility] [address]    Phone:  [phone]    Fax:  [phone]                                       After Visit Summary   4/28/2018    Kayla Howell    MRN: [id number]           After Visit Summary Signature Page     I have received my discharge instructions, and my questions have been answered. I have discussed any challenges I see with this plan with the nurse or doctor.    ..........................................................................................................................................  Patient/Patient Representative Signature      ..........................................................................................................................................  Patient Representative Print Name and Relationship to Patient    ..................................................               ................................................  Date                                            Time    ..........................................................................................................................................  Reviewed by Signature/Title    ...................................................              ..............................................  Date                                                            Time

## 2018-04-28 NOTE — IP AVS SNAPSHOT
MRN:[id number]                      After Visit Summary   4/28/2018    Kayla Howell    MRN: [id number]           Thank you!     Thank you for choosing [facility] for your care. Our goal is always to provide you with excellent care. Hearing back from our patients is one way we can continue to improve our services. Please take a few minutes to complete the written survey that you may receive in the mail after you visit. If you would like to speak to someone directly about your visit please contact Patient Relations at [phone]. Thank you!          Patient Information     Date Of Birth          1985        About your hospital stay     You were admitted on:  April 28, 2018 You last received care in the:  [facility]    You were discharged on:  April 28, 2018       Who to Call     For medical emergencies, please call 911.  For non-urgent questions about your medical care, please call your primary care provider or clinic, [phone]          Attending Provider     Provider Specialty    Marly Lane MD OB/Gyn       Primary Care Provider Office Phone # Fax #    [facility] [phone]      Further instructions from your care team       Discharge Instruction for Undelivered Patients      You were seen for: Pelvic Pressure  We Consulted: Dr Lane  You had (Test or Medicine): Fetal and uterine monitoring, urinalysis, and vaginal exam     Diet:   Resume normal diet     Activity:  Call your doctor or nurse midwife if your baby is moving less than usual.     Call your provider if you notice:  Swelling in your face or increased swelling in your hands or legs.  Headaches that are not relieved by Tylenol (acetaminophen).  Changes in your vision (blurring: seeing spots or stars.)  Nausea (sick to your stomach) and vomiting (throwing up).   Weight gain of 5 pounds or more per week.  Heartburn that doesn't go away.  Signs  "of bladder infection: pain when you urinate (use the toilet), need to go more often and more urgently.  The bag of raman (rupture of membranes) breaks, or you notice leaking in your underwear.  Bright red blood in your underwear.  Abdominal (lower belly) or stomach pain.  *If less than 34 weeks: Contractions (tightenings) more than 6 times in one hour.  Increase or change in vaginal discharge (note the color and amount)      Follow-up:  As scheduled in the clinic          Pending Results     No orders found from 2018 to 2018.            Admission Information     Date & Time Provider Department Dept. Phone    2018 Marly Lane MD [facility] [phone]      Your Vitals Were     Temperature Height Weight BMI (Body Mass Index)          97.9  F (36.6  C) 1.651 m (5' 5\") 83 kg (183 lb) 30.45 kg/m2        MyChart Information     Company lets you send messages to your doctor, view your test results, renew your prescriptions, schedule appointments and more. To sign up, go to [email or web address] . Click on \"Log in\" on the left side of the screen, which will take you to the Welcome page. Then click on \"Sign up Now\" on the right side of the page.     You will be asked to enter the access code listed below, as well as some personal information. Please follow the directions to create your username and password.     Your access code is: [email or web address]  Expires: 2018  1:06 PM     Your access code will  in 90 days. If you need help or a new code, please call your [place] clinic or [phone].        Care EveryWhere ID     This is your Care EveryWhere ID. This could be used by other organizations to access your [place] medical records  [id number]        Equal Access to Services     JAIME WADE : Reggie Connolly, angi hernandez, cristina coronado. So [facility] [phone].    ATENCIÓN: Si habla " español, tiene a porter disposición servicios gratuitos de asistencia lingüística. Negrito mak [phone].    We comply with applicable federal civil rights laws and [place] laws. We do not discriminate on the basis of race, color, national origin, age, disability, sex, sexual orientation, or gender identity.               Review of your medicines      UNREVIEWED medicines. Ask your doctor about these medicines        Dose / Directions    * albuterol 108 (90 Base) MCG/ACT Inhaler   Commonly known as:  PROAIR HFA/PROVENTIL HFA/VENTOLIN HFA   Used for:  Bronchitis        Dose:  1-2 puff   Inhale 1-2 puffs into the lungs every 4 hours as needed for shortness of breath / dyspnea or wheezing   Quantity:  1 Inhaler   Refills:  0       * albuterol 108 (90 Base) MCG/ACT Inhaler   Commonly known as:  PROAIR HFA/PROVENTIL HFA/VENTOLIN HFA        Dose:  2 puff   Inhale 2 puffs into the lungs every 4 hours as needed for shortness of breath / dyspnea or wheezing   Quantity:  1 Inhaler   Refills:  0       ferrous sulfate 142 (45 Fe) MG Tbcr   Commonly known as:  SLO-FE   Used for:  LISA (iron deficiency anaemia)        Dose:  142 mg   Take 1 tablet (142 mg) by mouth daily   Quantity:  30 tablet   Refills:  prn       fluticasone 110 MCG/ACT Inhaler   Commonly known as:  FLOVENT HFA   Used for:  Acute bronchitis        Dose:  2 puff   Inhale 2 puffs into the lungs 2 times daily   Quantity:  1 Inhaler   Refills:  1       GABAPENTIN PO        Refills:  0       LEVOTHYROXINE SODIUM PO        Take by mouth daily   Refills:  0       METFORMIN HCL PO   Indication:  GDM        Dose:  500 mg   Take 500 mg by mouth 2 times daily (with meals)   Refills:  0       OMEPRAZOLE PO        Refills:  0       PRENATAL VITAMINS PO        Dose:  1 tablet   Take 1 tablet by mouth daily.   Refills:  0       * Notice:  This list has 2 medication(s) that are the same as other medications prescribed for you. Read the directions carefully, and ask your doctor or  other care provider to review them with you.             Protect others around you: Learn how to safely use, store and throw away your medicines at www.disposemymeds.org.             Medication List: This is a list of all your medications and when to take them. Check marks below indicate your daily home schedule. Keep this list as a reference.      Medications           Morning Afternoon Evening Bedtime As Needed    * albuterol 108 (90 Base) MCG/ACT Inhaler   Commonly known as:  PROAIR HFA/PROVENTIL HFA/VENTOLIN HFA   Inhale 1-2 puffs into the lungs every 4 hours as needed for shortness of breath / dyspnea or wheezing                                * albuterol 108 (90 Base) MCG/ACT Inhaler   Commonly known as:  PROAIR HFA/PROVENTIL HFA/VENTOLIN HFA   Inhale 2 puffs into the lungs every 4 hours as needed for shortness of breath / dyspnea or wheezing                                ferrous sulfate 142 (45 Fe) MG Tbcr   Commonly known as:  SLO-FE   Take 1 tablet (142 mg) by mouth daily                                fluticasone 110 MCG/ACT Inhaler   Commonly known as:  FLOVENT HFA   Inhale 2 puffs into the lungs 2 times daily                                GABAPENTIN PO                                LEVOTHYROXINE SODIUM PO   Take by mouth daily                                METFORMIN HCL PO   Take 500 mg by mouth 2 times daily (with meals)                                OMEPRAZOLE PO                                PRENATAL VITAMINS PO   Take 1 tablet by mouth daily.                                * Notice:  This list has 2 medication(s) that are the same as other medications prescribed for you. Read the directions carefully, and ask your doctor or other care provider to review them with you.

## 2018-11-06 ENCOUNTER — ALLIED HEALTH/NURSE VISIT (OUTPATIENT)
Dept: NURSING | Facility: CLINIC | Age: 33
End: 2018-11-06
Payer: COMMERCIAL

## 2018-11-06 DIAGNOSIS — Z23 NEED FOR PROPHYLACTIC VACCINATION AND INOCULATION AGAINST INFLUENZA: Primary | ICD-10-CM

## 2018-11-06 PROCEDURE — 90471 IMMUNIZATION ADMIN: CPT

## 2018-11-06 PROCEDURE — 90686 IIV4 VACC NO PRSV 0.5 ML IM: CPT

## 2018-11-06 NOTE — PROGRESS NOTES

## 2018-11-06 NOTE — MR AVS SNAPSHOT
"              After Visit Summary   2018    Kayla Howell    MRN: [id number]           Patient Information     Date Of Birth          1985        Visit Information        Provider Department      2018 11:30 AM SIVAN NURSE AB [facility]        Today's Diagnoses     Need for prophylactic vaccination and inoculation against influenza    -  1       Follow-ups after your visit        Who to contact     If you have questions or need follow up information about today's clinic visit or your schedule please contact [facility] directly at [phone].  Normal or non-critical lab and imaging results will be communicated to you by [employer], letter or phone within 4 business days after the clinic has received the results. If you do not hear from us within 7 days, please contact the clinic through [employer] or phone. If you have a critical or abnormal lab result, we will notify you by phone as soon as possible.  Submit refill requests through [employer] or call your pharmacy and they will forward the refill request to us. Please allow 3 business days for your refill to be completed.          Additional Information About Your Visit        MyChart Information     URX lets you send messages to your doctor, view your test results, renew your prescriptions, schedule appointments and more. To sign up, go to [email or web address] . Click on \"Log in\" on the left side of the screen, which will take you to the Welcome page. Then click on \"Sign up Now\" on the right side of the page.     You will be asked to enter the access code listed below, as well as some personal information. Please follow the directions to create your username and password.     Your access code is: [email or web address]  Expires: 2019  8:39 AM     Your access code will  in 90 days. If you need help or a new code, please call your [facility] or [phone].        Care EveryWhere ID     This is your Care EveryWhere ID. " This could be used by other organizations to access your [place] medical records  [id number]         Blood Pressure from Last 3 Encounters:   04/28/18 105/63   11/03/17 100/62   07/31/17 100/58    Weight from Last 3 Encounters:   04/28/18 183 lb (83 kg)   11/03/17 156 lb 14.4 oz (71.2 kg)   07/31/17 160 lb (72.6 kg)              We Performed the Following     FLU VACCINE, SPLIT VIRUS, IM (QUADRIVALENT) [47987]- >3 YRS     Vaccine Administration, Initial [31405]        Primary Care Provider Office Phone # Fax #    [facility] [phone] [address] [facility] [address]        Equal Access to Services     JAIME WADE : Reggie delgadoo Soshital, waaxda luqadaha, qaybta kaalmada adevillayaherberth, cristina paredes. So [facility] [phone].    ATENCIÓN: Si habla español, tiene a porter disposición servicios gratuitos de asistencia lingüística. [facility] [phone].    We comply with applicable federal civil rights laws and [place] laws. We do not discriminate on the basis of race, color, national origin, age, disability, sex, sexual orientation, or gender identity.            Thank you!     Thank you for choosing [facility]  for your care. Our goal is always to provide you with excellent care. Hearing back from our patients is one way we can continue to improve our services. Please take a few minutes to complete the written survey that you may receive in the mail after your visit with us. Thank you!             Your Updated Medication List - Protect others around you: Learn how to safely use, store and throw away your medicines at www.disposemymeds.org.          This list is accurate as of 11/6/18 11:59 PM.  Always use your most recent med list.                   Brand Name Dispense Instructions for use Diagnosis    * albuterol 108 (90 Base) MCG/ACT inhaler    PROAIR HFA/PROVENTIL HFA/VENTOLIN HFA    1 Inhaler    Inhale 1-2 puffs into the  lungs every 4 hours as needed for shortness of breath / dyspnea or wheezing    Bronchitis       * albuterol 108 (90 Base) MCG/ACT inhaler    PROAIR HFA/PROVENTIL HFA/VENTOLIN HFA    1 Inhaler    Inhale 2 puffs into the lungs every 4 hours as needed for shortness of breath / dyspnea or wheezing        ferrous sulfate 142 (45 Fe) MG Tbcr    SLO-FE    30 tablet    Take 1 tablet (142 mg) by mouth daily    LISA (iron deficiency anaemia)       fluticasone 110 MCG/ACT Inhaler    FLOVENT HFA    1 Inhaler    Inhale 2 puffs into the lungs 2 times daily    Acute bronchitis       GABAPENTIN PO           LEVOTHYROXINE SODIUM PO      Take by mouth daily        METFORMIN HCL PO      Take 500 mg by mouth 2 times daily (with meals)        OMEPRAZOLE PO           PRENATAL VITAMINS PO      Take 1 tablet by mouth daily.        * Notice:  This list has 2 medication(s) that are the same as other medications prescribed for you. Read the directions carefully, and ask your doctor or other care provider to review them with you.

## 2019-06-24 NOTE — PROGRESS NOTES
34 yo  at 30 weeks presented to the [facility] via the emergency room for evaluation of pelvic pressure. Pt goes to an [facility] clinic, has no OB provider here at [facility]    Pt reports pressure, no contractions. Hx via  phone. No leaking, bleeding. Baby active. Hx 2 term deliveries, no hx PTL.   Pregnancy apparently uncomplicated to date. Has next appt next week Friday    FHR category 1, reactive  cx very high. No presenting part palpable. Closed.  UA negative.     Abdomen soft, nontender    Pt reassured no signs PTL. FHR reassuring. OK to be discharged.Will f/u with her clinic this week   BEHAVIORAL TEAM DISCUSSION    Participants: MD,RN,OT,PA,CM  Progress: Severely depressed  Continued Stay Criteria/Rationale: Medication change; initiation of course of ECT  Medical/Physical: NA  Precautions: Code1  Behavioral Orders   Procedures    Code 1 - Restrict to Unit    Electroconvulsive therapy     Series of up to 6 treatments. Begin Date: 6/21/19     Treating Psychiatrist providing ECT:  Dr. Kwon and Dr. Madrigal      Notified on:  6/20/19    Electroconvulsive therapy     Series of up to 6 treatments. Begin Date: 6/21/19     Treating Psychiatrist providing ECT:  Dr. Kwon and Dr. Madrigal      Notified on:  6/20/19    Routine Programming     As clinically indicated    Status 15     Every 15 minutes.     Plan:  First ECT treatment 6/24/19-6 planned.  Rationale for change in precautions or plan: NA

## 2019-08-01 ENCOUNTER — HOSPITAL ENCOUNTER (EMERGENCY)
Facility: CLINIC | Age: 34
Discharge: HOME | End: 2019-08-01
Attending: PHYSICIAN ASSISTANT | Admitting: PHYSICIAN ASSISTANT
Payer: COMMERCIAL

## 2019-08-01 ENCOUNTER — APPOINTMENT (OUTPATIENT)
Dept: ULTRASOUND IMAGING | Facility: CLINIC | Age: 34
End: 2019-08-01
Attending: PHYSICIAN ASSISTANT
Payer: COMMERCIAL

## 2019-08-01 VITALS
SYSTOLIC BLOOD PRESSURE: 111 MMHG | TEMPERATURE: 98.3 F | HEART RATE: 74 BPM | DIASTOLIC BLOOD PRESSURE: 73 MMHG | OXYGEN SATURATION: 99 % | RESPIRATION RATE: 18 BRPM

## 2019-08-01 DIAGNOSIS — R10.13 ABDOMINAL PAIN, EPIGASTRIC: ICD-10-CM

## 2019-08-01 LAB
ALBUMIN SERPL-MCNC: 3.6 G/DL (ref 3.4–5)
ALBUMIN UR-MCNC: NEGATIVE MG/DL
ALP SERPL-CCNC: 51 U/L (ref 40–150)
ALT SERPL W P-5'-P-CCNC: 17 U/L (ref 0–50)
ANION GAP SERPL CALCULATED.3IONS-SCNC: 4 MMOL/L (ref 3–14)
APPEARANCE UR: CLEAR
AST SERPL W P-5'-P-CCNC: 13 U/L (ref 0–45)
BASOPHILS # BLD AUTO: 0.1 10E9/L (ref 0–0.2)
BASOPHILS NFR BLD AUTO: 0.6 %
BILIRUB SERPL-MCNC: 0.2 MG/DL (ref 0.2–1.3)
BILIRUB UR QL STRIP: NEGATIVE
BUN SERPL-MCNC: 11 MG/DL (ref 7–30)
CALCIUM SERPL-MCNC: 8.7 MG/DL (ref 8.5–10.1)
CHLORIDE SERPL-SCNC: 108 MMOL/L (ref 94–109)
CO2 SERPL-SCNC: 27 MMOL/L (ref 20–32)
COLOR UR AUTO: ABNORMAL
CREAT SERPL-MCNC: 0.6 MG/DL (ref 0.52–1.04)
DIFFERENTIAL METHOD BLD: NORMAL
EOSINOPHIL # BLD AUTO: 0.7 10E9/L (ref 0–0.7)
EOSINOPHIL NFR BLD AUTO: 8.2 %
ERYTHROCYTE [DISTWIDTH] IN BLOOD BY AUTOMATED COUNT: 12.1 % (ref 10–15)
GFR SERPL CREATININE-BSD FRML MDRD: >90 ML/MIN/{1.73_M2}
GLUCOSE SERPL-MCNC: 92 MG/DL (ref 70–99)
GLUCOSE UR STRIP-MCNC: NEGATIVE MG/DL
HCG UR QL: NEGATIVE
HCT VFR BLD AUTO: 41.2 % (ref 35–47)
HGB BLD-MCNC: 13.2 G/DL (ref 11.7–15.7)
HGB UR QL STRIP: NEGATIVE
IMM GRANULOCYTES # BLD: 0 10E9/L (ref 0–0.4)
IMM GRANULOCYTES NFR BLD: 0.2 %
KETONES UR STRIP-MCNC: NEGATIVE MG/DL
LEUKOCYTE ESTERASE UR QL STRIP: ABNORMAL
LIPASE SERPL-CCNC: 98 U/L (ref 73–393)
LYMPHOCYTES # BLD AUTO: 3.4 10E9/L (ref 0.8–5.3)
LYMPHOCYTES NFR BLD AUTO: 40.2 %
MCH RBC QN AUTO: 28.8 PG (ref 26.5–33)
MCHC RBC AUTO-ENTMCNC: 32 G/DL (ref 31.5–36.5)
MCV RBC AUTO: 90 FL (ref 78–100)
MONOCYTES # BLD AUTO: 0.4 10E9/L (ref 0–1.3)
MONOCYTES NFR BLD AUTO: 5 %
MUCOUS THREADS #/AREA URNS LPF: PRESENT /LPF
NEUTROPHILS # BLD AUTO: 3.8 10E9/L (ref 1.6–8.3)
NEUTROPHILS NFR BLD AUTO: 45.8 %
NITRATE UR QL: NEGATIVE
NRBC # BLD AUTO: 0 10*3/UL
NRBC BLD AUTO-RTO: 0 /100
PH UR STRIP: 6 PH (ref 5–7)
PLATELET # BLD AUTO: 232 10E9/L (ref 150–450)
POTASSIUM SERPL-SCNC: 4 MMOL/L (ref 3.4–5.3)
PROT SERPL-MCNC: 7.5 G/DL (ref 6.8–8.8)
RBC # BLD AUTO: 4.58 10E12/L (ref 3.8–5.2)
RBC #/AREA URNS AUTO: 2 /HPF (ref 0–2)
SODIUM SERPL-SCNC: 139 MMOL/L (ref 133–144)
SOURCE: ABNORMAL
SP GR UR STRIP: 1.02 (ref 1–1.03)
SQUAMOUS #/AREA URNS AUTO: 2 /HPF (ref 0–1)
UROBILINOGEN UR STRIP-MCNC: NORMAL MG/DL (ref 0–2)
WBC # BLD AUTO: 8.4 10E9/L (ref 4–11)
WBC #/AREA URNS AUTO: 2 /HPF (ref 0–5)

## 2019-08-01 PROCEDURE — 81025 URINE PREGNANCY TEST: CPT | Performed by: PHYSICIAN ASSISTANT

## 2019-08-01 PROCEDURE — 76705 ECHO EXAM OF ABDOMEN: CPT

## 2019-08-01 PROCEDURE — 85025 COMPLETE CBC W/AUTO DIFF WBC: CPT | Performed by: PHYSICIAN ASSISTANT

## 2019-08-01 PROCEDURE — 80053 COMPREHEN METABOLIC PANEL: CPT | Performed by: PHYSICIAN ASSISTANT

## 2019-08-01 PROCEDURE — 83690 ASSAY OF LIPASE: CPT | Performed by: PHYSICIAN ASSISTANT

## 2019-08-01 PROCEDURE — 96374 THER/PROPH/DIAG INJ IV PUSH: CPT

## 2019-08-01 PROCEDURE — 99284 EMERGENCY DEPT VISIT MOD MDM: CPT | Mod: 25

## 2019-08-01 PROCEDURE — 81001 URINALYSIS AUTO W/SCOPE: CPT | Performed by: PHYSICIAN ASSISTANT

## 2019-08-01 PROCEDURE — 96361 HYDRATE IV INFUSION ADD-ON: CPT

## 2019-08-01 PROCEDURE — 25000128 H RX IP 250 OP 636: Performed by: PHYSICIAN ASSISTANT

## 2019-08-01 RX ORDER — ONDANSETRON 2 MG/ML
4 INJECTION INTRAMUSCULAR; INTRAVENOUS ONCE
Status: COMPLETED | OUTPATIENT
Start: 2019-08-01 | End: 2019-08-01

## 2019-08-01 RX ADMIN — ONDANSETRON HYDROCHLORIDE 4 MG: 2 INJECTION, SOLUTION INTRAMUSCULAR; INTRAVENOUS at 20:17

## 2019-08-01 RX ADMIN — SODIUM CHLORIDE 1000 ML: 9 INJECTION, SOLUTION INTRAVENOUS at 20:16

## 2019-08-01 ASSESSMENT — ENCOUNTER SYMPTOMS
ABDOMINAL PAIN: 1
FEVER: 0
VOMITING: 1
NAUSEA: 1
DYSURIA: 0
DIARRHEA: 0
HEMATURIA: 0

## 2019-08-01 NOTE — ED AVS SNAPSHOT
[facility]  [address]  [facility] [address]  Phone:  [phone]  Fax:  [phone]                                    Kayla Howell   MRN: [id number]    Department:  [facility]   Date of Visit:  8/1/2019           After Visit Summary Signature Page    I have received my discharge instructions, and my questions have been answered. I have discussed any challenges I see with this plan with the nurse or doctor.    ..........................................................................................................................................  Patient/Patient Representative Signature      ..........................................................................................................................................  Patient Representative Print Name and Relationship to Patient    ..................................................               ................................................  Date                                   Time    ..........................................................................................................................................  Reviewed by Signature/Title    ...................................................              ..............................................  Date                                               Time          22EPIC Rev 08/18

## 2019-08-02 NOTE — ED PROVIDER NOTES
History     Chief Complaint:  Abdominal Pain      HPI   Kayla Howell is a 34 year old female who presents with constant throbbing mid-abdominal pain which has been present since yesterday. She has only been able to hold down water over this time and she endorses nausea and 3 episodes of vomiting. No hematemesis. She denies diarrhea, dysuria, hematuria and fever.    Allergies:  No known drug allergies.    Medications:    Albuterol   Flovent  Gabapentin   Levothyroxine   Metformin   Omeprazole     Past Medical History:    DM type 2  Thyroid disease    Past Surgical History:    History reviewed. No pertinent past surgical history.    Family History:    History reviewed. No pertinent family history.    Social History:  Presents to the ED by herself.  Tobacco Use: Never  Alcohol Use: No  PCP: [facility]  Marital Status:   [2]     Review of Systems   Constitutional: Negative for fever.   Gastrointestinal: Positive for abdominal pain, nausea and vomiting. Negative for diarrhea.   Genitourinary: Negative for dysuria and hematuria.   All other systems reviewed and are negative.       Physical Exam   First Vitals:  BP: 101/57  Pulse: 64  Heart Rate: 64  Temp: 98.3  F (36.8  C)  Resp: 18  SpO2: 96 %      Physical Exam  Constitutional: comfortable and well appearing.   Head: No external signs of trauma noted to head or face.   Eyes: Pupils are equal, round, and reactive to light. Conjunctiva normal. No scleral icterus.   ENT: MMM. Normal voice.   Neck: normal ROM.   Cardiovascular: Normal rate, regular rhythm, and intact distal pulses.    Respiratory: Effort normal. No respiratory distress. Lungs clear to auscultation bilaterally.   GI: Soft. Mild epigastric tenderness. No rebound or guarding. The remainder of the abdomen is soft and non-tender.   Musculoskeletal: No deformities appreciated. Normal ROM. No edema noted.  Neurological: Alert and Oriented x 3. Speech normal. Moves all extremities  equally.   Psychiatric: Appropriate mood, affect, and behavior.   Skin: Skin is warm and dry. no jaundice.       Emergency Department Course     Laboratory:  CBC:  WBC 8.4, HGB 13.2, , otherwise WNL  CMP: WNL (Creatinine 0.60)  Lipase: 98  HCG urine: Negative  UA: Clear light yellow urine, squamous 2 (H), mucous present, otherwise WNL    Interventions:  2016: Normal Saline, 1 liter, IV bolus   2017: Zofran, 4 mg, IV injection     Emergency Department Course:  The patient arrived in triage where vitals were measured and recorded.   The patient was then escorted back to the emergency department.   The patient's medical records were reviewed.  Nursing notes and vitals were reviewed.  : I performed an exam of the patient as documented above.  The above workup was undertaken.  : I rechecked the patient and discussed results.    Findings and plan explained to the Patient. Patient discharged home, status improved, with instructions regarding supportive care, medications, and reasons to return as well as the importance of close follow-up was reviewed.     Impression & Plan      Medical Decision Makin year old female presenting with epigastric abdominal pain. Differential diagnosis considered includes but is not limited to gastritis, PUD, cholecystitis, biliary colic, early appendicitis, pancreatitis, among others. She is afebrile on arrival. She has a benign abdominal exam. Urinalysis is negative for any evidence of infection. There is no blood in the urine, making urinary tract stone unlikely. She is not pregnant. Labs are unremarkable, including normal lipase. RUQ ultrasound does not reveal any evidence of gallstones or cholecystitis. On reassessment, she is feeling improved and continues to have a benign abdominal exam. We did discuss that early appendicitis is still on the differential. Discussed the risks and benefits of CT to evaluate for appendicitis. Ultimately, the patient and her significant  other decided to forego CT at this time, which I think is reasonable given her benign exam. She understands that any worsening pain, fever, persistent vomiting, or migration of pain to the RLQ requires her to return to the ED for further evaluation and likely CT. She will otherwise follow-up with PCP in 1-2 days for reassessment.     Diagnosis:    ICD-10-CM    1. Abdominal pain, epigastric R10.13        Disposition:  Discharged to home.       I, Rosette Cooley, am serving as a scribe on 8/1/2019 at 7:39 PM to personally document services performed by Kaitlyn Albright PA-C, based on my observations and the provider's statements to me.   [facility] EMERGENCY DEPARTMENT       Kaitlyn Albright PA-C  08/01/19 1731

## 2019-08-14 ENCOUNTER — OFFICE VISIT (OUTPATIENT)
Dept: URGENT CARE | Facility: URGENT CARE | Age: 34
End: 2019-08-14
Payer: COMMERCIAL

## 2019-08-14 VITALS
DIASTOLIC BLOOD PRESSURE: 70 MMHG | RESPIRATION RATE: 20 BRPM | OXYGEN SATURATION: 98 % | HEART RATE: 65 BPM | SYSTOLIC BLOOD PRESSURE: 110 MMHG | TEMPERATURE: 97.6 F

## 2019-08-14 DIAGNOSIS — R11.11 NON-INTRACTABLE VOMITING WITHOUT NAUSEA, UNSPECIFIED VOMITING TYPE: Primary | ICD-10-CM

## 2019-08-14 LAB
ALBUMIN UR-MCNC: NEGATIVE MG/DL
APPEARANCE UR: CLEAR
BACTERIA #/AREA URNS HPF: ABNORMAL /HPF
BILIRUB UR QL STRIP: NEGATIVE
COLOR UR AUTO: YELLOW
GLUCOSE UR STRIP-MCNC: NEGATIVE MG/DL
HCG UR QL: NEGATIVE
HGB UR QL STRIP: NEGATIVE
KETONES UR STRIP-MCNC: NEGATIVE MG/DL
LEUKOCYTE ESTERASE UR QL STRIP: ABNORMAL
NITRATE UR QL: NEGATIVE
NON-SQ EPI CELLS #/AREA URNS LPF: ABNORMAL /LPF
PH UR STRIP: 7 PH (ref 5–7)
RBC #/AREA URNS AUTO: ABNORMAL /HPF
SOURCE: ABNORMAL
SP GR UR STRIP: 1.01 (ref 1–1.03)
UROBILINOGEN UR STRIP-ACNC: 0.2 EU/DL (ref 0.2–1)
WBC #/AREA URNS AUTO: ABNORMAL /HPF

## 2019-08-14 PROCEDURE — 81001 URINALYSIS AUTO W/SCOPE: CPT | Performed by: PHYSICIAN ASSISTANT

## 2019-08-14 PROCEDURE — 99213 OFFICE O/P EST LOW 20 MIN: CPT | Performed by: PHYSICIAN ASSISTANT

## 2019-08-14 PROCEDURE — 81025 URINE PREGNANCY TEST: CPT | Performed by: PHYSICIAN ASSISTANT

## 2019-08-14 NOTE — PATIENT INSTRUCTIONS
"  Patient Education     Vomiting (Adult)  Vomiting is a common symptom that may be due to different causes. These include gastroenteritis (\"stomach flu\"), food poisoning and gastritis. There are other more serious causes of vomiting which may be hard to diagnose early in the illness. Therefore, it is important to watch for the warning signs listed below.  The main danger from repeated vomiting is dehydration. This is due to excess loss of water and minerals from the body. When this occurs, your body fluids must be replaced.  Home care    If symptoms are severe, rest at home for the next 24 hours.    Because your symptoms may be from an infection, wash your hands often and well. If soap and water are not available, use alcohol-based  to keep from spreading the infection to others.    Wash your hands for at least 20 seconds. Humming the happy birthday song twice while you wash is an easy way to make sure you've washed for 20 seconds.    Wash your hands after using the toilet, before and after preparing food, before eating food, after changing a diaper, cleaning a wound, caring for a sick person, and blowing your nose, coughing, or sneezing. You should also wash your hands after caring for someone who is sick, touching pet food, or treats, and touching an animal, or animal waste.    You may use acetaminophen or NSAID medicines like ibuprofen or naproxen to control fever, unless another medicine was prescribed. If you have chronic liver or kidney disease or ever had a stomach ulcer or gastrointestinal bleeding, talk with your doctor before using these medicines. Aspirin should never be used in anyone under 18 years of age who is ill with a fever. It may cause severe liver damage. Don't use NSAID medicines if you are already taking one for another condition (like arthritis) or are on aspirin (such as for heart disease, or after a stroke)    Don't use tobacco and or drink alcohol, which may worsen your " symptoms.    If medicines for vomiting were prescribed, take as directed.    Once vomiting stops, then follow these guidelines:  During the first 12 to 24 hours follow the diet below:    Fruit juices. Apple, grape juice, clear fruit drinks, and electrolyte replacement drinks.    Beverages. Soft drinks without caffeine; mineral water (plain or flavored), decaffeinated tea and coffee.    Soups. Clear broth and bouillon    Desserts. Plain gelatin, ice pops, and fruit juice bars. As you feel better, you may add 6 to 8 ounces of yogurt per day.  During the next 24 hours you may add the following to the above:    Hot cereal, plain toast, bread, rolls, crackers    Plain noodles, rice, mashed potatoes, chicken noodle or rice soup    Unsweetened canned fruit such as applesauce, bananas (avoid pineapple and citrus)    Limit caffeine and chocolate. No spices or seasonings except salt.  During the next 24 hours:  Gradually resume a normal diet, as you feel better and your symptoms lessen.  Follow-up care  Follow up with your healthcare provider, or as advised.  When to seek medical advice  Call your healthcare provider right away if any of these occur:    Constant right-sided lower belly pain or increasing general belly pain    Continued vomiting (unable to keep liquids down) for 24 hours    Vomiting blood or coffee grounds    Swollen belly    Frequent diarrhea (more than 5 times a day); blood (red or black color) or mucus in diarrhea    Reduced urine output or extreme thirst    Weakness, dizziness or fainting    Unusually drowsy or confused    Fever of 100.4 F (38 C) oral or higher, or as directed    Yellow color of the eyes or skin  Date Last Reviewed: 3/1/2018    7770-7660 [employer]. [address] [place] [address], [place] [address]. All rights reserved. This information is not intended as a substitute for professional medical care. Always follow your healthcare professional's instructions.

## 2019-08-14 NOTE — PROGRESS NOTES
SUBJECTIVE:  Chief Complaint   Patient presents with     Urgent Care     Vomiting     vomiting/headache/dizzy      Kayla Howell is a 34 year old female whose symptoms began 3 days ago and include vomiting.    Symptoms are sudden onset and mild.    Aggravating factors: mornings.    Alleviating factors:nothing  Associated symptoms:  Pain:No  Fever: low grade fevers  Appetite: normal  Risk factors: none  Thinks she may be pregnant    Past Medical History:   Diagnosis Date     Diabetes (H)      Thyroid disease    .  Current Outpatient Medications   Medication Sig Dispense Refill     albuterol (PROAIR HFA, PROVENTIL HFA, VENTOLIN HFA) 108 (90 BASE) MCG/ACT inhaler Inhale 1-2 puffs into the lungs every 4 hours as needed for shortness of breath / dyspnea or wheezing 1 Inhaler 0     albuterol (PROAIR HFA/PROVENTIL HFA/VENTOLIN HFA) 108 (90 BASE) MCG/ACT Inhaler Inhale 2 puffs into the lungs every 4 hours as needed for shortness of breath / dyspnea or wheezing (Patient not taking: Reported on 11/3/2017) 1 Inhaler 0     ferrous sulfate (SLO-FE) 142 (45 FE) MG TBCR Take 1 tablet (142 mg) by mouth daily 30 tablet prn     fluticasone (FLOVENT HFA) 110 MCG/ACT inhaler Inhale 2 puffs into the lungs 2 times daily 1 Inhaler 1     GABAPENTIN PO        LEVOTHYROXINE SODIUM PO Take by mouth daily       METFORMIN HCL PO Take 500 mg by mouth 2 times daily (with meals)       OMEPRAZOLE PO        PRENATAL VITAMINS PO Take 1 tablet by mouth daily.       Social History     Tobacco Use     Smoking status: Never Smoker     Smokeless tobacco: Never Used   Substance Use Topics     Alcohol use: No       ROS:  10 point ROS negative except as listed above    OBJECTIVE:  /70   Pulse 65   Temp 97.6  F (36.4  C)   Resp 20   LMP 07/08/2019   SpO2 98%   GENERAL APPEARANCE: healthy, alert and no distress  EYES: EOMI,  PERRL, conjunctiva clear  HENT: ear canals and TM's normal.  Nose and mouth without ulcers, erythema or lesions  NECK:  "supple, nontender, no lymphadenopathy  RESP: lungs clear to auscultation - no rales, rhonchi or wheezes  CV: regular rates and rhythm, normal S1 S2, no murmur noted  ABDOMEN:  soft, nontender, no HSM or masses and bowel sounds normal  NEURO: Normal strength and tone, sensory exam grossly normal,  normal speech and mentation  SKIN: no suspicious lesions or rashes    Results for orders placed or performed in visit on 08/14/19   *UA reflex to Microscopic and Culture ([facility] (except [place] and [place])   Result Value Ref Range    Color Urine Yellow     Appearance Urine Clear     Glucose Urine Negative NEG^Negative mg/dL    Bilirubin Urine Negative NEG^Negative    Ketones Urine Negative NEG^Negative mg/dL    Specific Gravity Urine 1.015 1.003 - 1.035    Blood Urine Negative NEG^Negative    pH Urine 7.0 5.0 - 7.0 pH    Protein Albumin Urine Negative NEG^Negative mg/dL    Urobilinogen Urine 0.2 0.2 - 1.0 EU/dL    Nitrite Urine Negative NEG^Negative    Leukocyte Esterase Urine Small (A) NEG^Negative    Source Midstream Urine    HCG Qual, Urine ([id number])   Result Value Ref Range    HCG Qual Urine Negative NEG^Negative   Urine Microscopic   Result Value Ref Range    WBC Urine 0 - 5 OTO5^0 - 5 /HPF    RBC Urine O - 2 OTO2^O - 2 /HPF    Squamous Epithelial /LPF Urine Few FEW^Few /LPF    Bacteria Urine Few (A) NEG^Negative /HPF         ASSESSMENT:  (R11.11) Non-intractable vomiting without nausea, unspecified vomiting type  (primary encounter diagnosis)  Comment: no evidence of dehydration or infection. Tolerating food and fluids.  Plan: *UA reflex to Microscopic and Culture ([facility] (except [place] and         [place]), HCG Qual, Urine ([id number]), Urine         Microscopic    Patient Instructions     Patient Education     Vomiting (Adult)  Vomiting is a common symptom that may be due to different causes. These include gastroenteritis (\"stomach flu\"), food poisoning and " gastritis. There are other more serious causes of vomiting which may be hard to diagnose early in the illness. Therefore, it is important to watch for the warning signs listed below.  The main danger from repeated vomiting is dehydration. This is due to excess loss of water and minerals from the body. When this occurs, your body fluids must be replaced.  Home care    If symptoms are severe, rest at home for the next 24 hours.    Because your symptoms may be from an infection, wash your hands often and well. If soap and water are not available, use alcohol-based  to keep from spreading the infection to others.    Wash your hands for at least 20 seconds. Humming the happy birthday song twice while you wash is an easy way to make sure you've washed for 20 seconds.    Wash your hands after using the toilet, before and after preparing food, before eating food, after changing a diaper, cleaning a wound, caring for a sick person, and blowing your nose, coughing, or sneezing. You should also wash your hands after caring for someone who is sick, touching pet food, or treats, and touching an animal, or animal waste.    You may use acetaminophen or NSAID medicines like ibuprofen or naproxen to control fever, unless another medicine was prescribed. If you have chronic liver or kidney disease or ever had a stomach ulcer or gastrointestinal bleeding, talk with your doctor before using these medicines. Aspirin should never be used in anyone under 18 years of age who is ill with a fever. It may cause severe liver damage. Don't use NSAID medicines if you are already taking one for another condition (like arthritis) or are on aspirin (such as for heart disease, or after a stroke)    Don't use tobacco and or drink alcohol, which may worsen your symptoms.    If medicines for vomiting were prescribed, take as directed.    Once vomiting stops, then follow these guidelines:  During the first 12 to 24 hours follow the diet  below:    Fruit juices. Apple, grape juice, clear fruit drinks, and electrolyte replacement drinks.    Beverages. Soft drinks without caffeine; mineral water (plain or flavored), decaffeinated tea and coffee.    Soups. Clear broth and bouillon    Desserts. Plain gelatin, ice pops, and fruit juice bars. As you feel better, you may add 6 to 8 ounces of yogurt per day.  During the next 24 hours you may add the following to the above:    Hot cereal, plain toast, bread, rolls, crackers    Plain noodles, rice, mashed potatoes, chicken noodle or rice soup    Unsweetened canned fruit such as applesauce, bananas (avoid pineapple and citrus)    Limit caffeine and chocolate. No spices or seasonings except salt.  During the next 24 hours:  Gradually resume a normal diet, as you feel better and your symptoms lessen.  Follow-up care  Follow up with your healthcare provider, or as advised.  When to seek medical advice  Call your healthcare provider right away if any of these occur:    Constant right-sided lower belly pain or increasing general belly pain    Continued vomiting (unable to keep liquids down) for 24 hours    Vomiting blood or coffee grounds    Swollen belly    Frequent diarrhea (more than 5 times a day); blood (red or black color) or mucus in diarrhea    Reduced urine output or extreme thirst    Weakness, dizziness or fainting    Unusually drowsy or confused    Fever of 100.4 F (38 C) oral or higher, or as directed    Yellow color of the eyes or skin  Date Last Reviewed: 3/1/2018    [id number] [employer]. [address] [place] [id number]. All rights reserved. This information is not intended as a substitute for professional medical care. Always follow your healthcare professional's instructions.

## 2019-11-07 ENCOUNTER — OFFICE VISIT (OUTPATIENT)
Dept: URGENT CARE | Facility: URGENT CARE | Age: 34
End: 2019-11-07
Payer: COMMERCIAL

## 2019-11-07 VITALS — WEIGHT: 181.5 LBS | HEART RATE: 60 BPM | BODY MASS INDEX: 30.2 KG/M2 | TEMPERATURE: 98.4 F | OXYGEN SATURATION: 99 %

## 2019-11-07 DIAGNOSIS — R11.2 NON-INTRACTABLE VOMITING WITH NAUSEA, UNSPECIFIED VOMITING TYPE: ICD-10-CM

## 2019-11-07 DIAGNOSIS — N91.2 AMENORRHEA: ICD-10-CM

## 2019-11-07 DIAGNOSIS — A08.4 VIRAL GASTROENTERITIS: Primary | ICD-10-CM

## 2019-11-07 LAB — HCG UR QL: NEGATIVE

## 2019-11-07 PROCEDURE — 99213 OFFICE O/P EST LOW 20 MIN: CPT | Performed by: PHYSICIAN ASSISTANT

## 2019-11-07 PROCEDURE — 81025 URINE PREGNANCY TEST: CPT | Performed by: PHYSICIAN ASSISTANT

## 2019-11-07 ASSESSMENT — PAIN SCALES - GENERAL: PAINLEVEL: SEVERE PAIN (6)

## 2019-11-07 NOTE — PATIENT INSTRUCTIONS
"  Patient Education     Viral Gastroenteritis (Adult)    Gastroenteritis is commonly called the \"stomach flu,\" although it has nothing to do with influenza. It is most often caused by a virus that affects the stomach and intestinal tract and usually lasts from 2 to 7 days. Common viruses causing gastroenteritis include norovirus, rotavirus, and hepatitis A. Non-viral causes of gastroenteritis include bacteria, parasites, and toxins.  The danger from repeated vomiting or diarrhea is dehydration. This is the loss of too much fluid from the body. When this occurs, body fluids must be replaced. Antibiotics don't help with this illness because it is usually viral. Simple home treatment will be helpful.  Symptoms of viral gastroenteritis may include:    Watery, loose stools    Stomach pain or abdominal cramps    Fever and chills    Nausea and vomiting    Loss of bowel control    Headache  Home care  Gastroenteritis is transmitted by contact with the stool or vomit of an infected person. This can occur from person to person or from contact with a contaminated surface.  Follow these guidelines when caring for yourself at home:    If symptoms are severe, rest at home for the next 24 hours or until you are feeling better.    Wash your hands with soap and water or use alcohol-based  to prevent the spread of infection. Wash your hands after touching anyone who is sick.    Wash your hands or use alcohol-based  after using the toilet and before meals. Clean the toilet after each use.  Remember these tips when preparing food:    People with diarrhea should not prepare or serve food to others. When preparing foods, wash your hands before and after.    Wash your hands after using cutting boards, countertops, knives, or utensils that have been in contact with raw food.    Dry your hands with a single use towel.    Keep uncooked meats away from cooked and ready-to-eat foods.  Medicine  You may use acetaminophen or " NSAID medicines like ibuprofen or naproxen to control fever unless another medicine was given. If you have chronic liver or kidney disease, talk with your healthcare provider before using these medicines. Also talk with your provider if you've had a stomach ulcer or gastrointestinal bleeding. Don't give aspirin to anyone under 18 years of age who is ill with a fever. It may cause severe liver damage. Don't use NSAIDS is you are already taking one for another condition (like arthritis) or are on aspirin (such as for heart disease or after a stroke).  If medicine for vomiting or diarrhea are prescribed, take these only as directed. Nausea and diarrhea medicines are generally OK unless you have bleeding, fever, or severe abdominal pain.  Diet  Follow these guidelines for food:    Water and liquids are important so you don't get dehydrated. Drink a small amount at a time or suck on ice chips if you are vomiting.    If you eat, avoid fatty, greasy, spicy, or fried foods.    Don't eat dairy if you have diarrhea. This can make diarrhea worse.    Avoid tobacco, alcohol, and caffeine which may worsen symptoms.  During the first 24 hours (the first full day), follow the diet below:    Beverages. Sports drinks, soft drinks without caffeine, ginger ale, mineral water (plain or flavored), decaffeinated tea and coffee. If you are very dehydrated, sports drinks aren't a good choice. They have too much sugar and not enough electrolytes. In this case, commercially available products called oral rehydration solutions, are best.    Soups. Eat clear broth, consommé, and bouillon.    Desserts. Eat gelatin, ice pops, and fruit juice bars.  During the next 24 hours (the second day), you may add the following to the above:    Hot cereal, plain toast, bread, rolls, and crackers    Plain noodles, rice, mashed potatoes, chicken noodle or rice soup    Unsweetened canned fruit (avoid pineapple), bananas    Limit fat intake to less than 15 grams  per day. Do this by avoiding margarine, butter, oils, mayonnaise, sauces, gravies, fried foods, peanut butter, meat, poultry, and fish.    Limit fiber and avoid raw or cooked vegetables, fresh fruits (except bananas), and bran cereals.    Limit caffeine and chocolate. Don't use spices or seasonings other than salt.    Limit dairy products.    Avoid alcohol.  During the next 24 hours:    Gradually resume a normal diet as you feel better and your symptoms improve.    If at any time it starts getting worse again, go back to clear liquids until you feel better.  Follow-up care  Follow up with your healthcare provider, or as advised. Call your provider if you don't get better within 24 hours or if diarrhea lasts more than a week. Also follow up if you are unable to keep down liquids and get dehydrated. If a stool (diarrhea) sample was taken, call as directed for the results.  Call 911  Call 911 if any of these occur:    Trouble breathing    Chest pain    Confused    Severe drowsiness or trouble awakening    Fainting or loss of consciousness    Rapid heart rate    Seizure    Stiff neck  When to seek medical advice  Call your healthcare provider right away if any of these occur:    Abdominal pain that gets worse    Continued vomiting (unable to keep liquids down)    Frequent diarrhea (more than 5 times a day)    Blood in vomit or stool (black or red color)    Dark urine, reduced urine output, or extreme thirst    Weakness or dizziness    Drowsiness    Fever of 100.4 F (38 C) or higher, or as directed by your healthcare provider    New rash  Date Last Reviewed: 6/1/2018 2000-2018 [employer]. [address] [place] [address], [place] [address]. All rights reserved. This information is not intended as a substitute for professional medical care. Always follow your healthcare professional's instructions.    Also, as we discussed today, if you do not get your menstrual period in the next one week, you should have another  pregnancy test.     We discussed a urine test to screen for bladder infection and a Strep test to screen for Strep Throat today but you chose not to have those tests done today.

## 2019-11-07 NOTE — PROGRESS NOTES
SUBJECTIVE:     Kayla Howell presents to UC today for evaluation of intermittent nausea and vomiting x 4 days duration.    HPI: Patient states she developed vomiting Sunday morning. Patient states she has had 3 emesis per day, every day, for the past 4 days. Patient is also requesting a pregnancy test. States it has been one month since last menses.      Symptoms are gradual onset and still present/unchanged   Aggravating factors: Unknown. Patient states she has been taking in PO solid diet and full PO fluids. Patient states her 3 episodes of emesis daily don't seem to be aggravated or relieved by eating.    Alleviating factors:nothing  Associated symptoms:  Pain:No  Fever: no noted fevers  Diarrhea:  None. Normal BM's. LBM: today (no blood). No diarrhea   Appetite: Good     Risk factors: 2 children have started c/o nausea, HA and tummy ache today. Denies any  hx of IBS and possible bad food exposure    ROS:  INTEGUMENTARY/SKIN: negative for rash   ENT/MOUTH:  Positive for mild ST.  Negative nasal congestion or other ENT sxs   RESP:NEGATIVE for cough or SOB  CV: NEGATIVE for racing heartbeats, chest pain, palpitations or peripheral edema  MUSCULOSKELETAL: NEGATIVE for significant arthralgias or myalgia  NEURO: Positive for generalized, waxing and waning HA for past 4 days. Patient has not tried any OTC medications or other tx for HA. Negative for severe HA. NEGATIVE for syncope, near syncope, weakness, dizziness or paresthesias  URINARY:  Upon questioning, patient states they have been able to have at least 5+ good urination/24 hours despite above. Denies any dysuria, urinary urgency/frequency, dark cola colored urine, gross hematuria, fever, back or flank pain.  ENDOCRINE: Patient states she has had gestational DM but no DM outside of pregnancy.   GYN: . LMP approximately one month ago. Denies any abnormal vaginal discharge or pelvic pain     Past Medical History:   Diagnosis Date     Diabetes (H)       Thyroid disease      Current Outpatient Medications   Medication     ferrous sulfate (SLO-FE) 142 (45 FE) MG TBCR     LEVOTHYROXINE SODIUM PO     OMEPRAZOLE PO     PRENATAL VITAMINS PO     No current facility-administered medications for this visit.          No Known Allergies      OBJECTIVE:  Pulse 60   Temp 98.4  F (36.9  C) (Tympanic)   Wt 82.3 kg (181 lb 8 oz)   LMP 10/07/2019 (Exact Date)   SpO2 99%   BMI 30.20 kg/m          GENERAL APPEARANCE: healthy, alert and no distress  SKIN: No rash. No lesions. Good skin turgor  HEENT:   Conjunctiva without infection.  Sclera clear.  Left TM is normal: no effusions, no erythema, and normal landmarks.  Right TM is normal: no effusions, no erythema, and normal landmarks.  Nasal mucosa is normal.  Oropharyngeal exam is normal: no lesions, erythema, adenopathy or exudate. Mucous membranes are still moist. Specifically no dry, cracked lips.   Neck is supple, FROM with no adenopathy  RESP: lungs clear to auscultation - no rales, rhonchi or wheezes  CV: regular rates and rhythm, normal S1 S2, no murmur noted  ABDOMEN:  soft, nontender, no HSM or masses and bowel sounds normal  NEURO: Normal strength and tone, sensory exam grossly normal,  normal speech and mentation    Results for orders placed or performed in visit on 11/07/19   HCG qualitative urine     Status: None   Result Value Ref Range    HCG Qual Urine Negative NEG^Negative     Patient offered UA and Strep testing but declined today     ASSESSMENT/PLAN:          (A08.4) Viral gastroenteritis  (primary encounter diagnosis)  Comment: Likely viral gastroenteritis. Early pregnancy also possible despite negative HCG today. Limitations of today's HCG result are discussed with patient. I have advised she follow-up for repeat pregnancy testing if no menses and/or if ongoing episodic vomiting over next 7 days. Possible occult UTI also in differential. Patient declined UA today. Strep is in differential. Patient  declined screening for Strep.        PLAN:     Discharge Plan:       1. Stop all PO solid food for next 24 hours.   2. Diet: small amounts clear fluids frequently,soups,juices,water,advance diet as tolerated. This is reviewed verbally and provided in printed form.   3. Ok to try OTC Immodium   4. Call 911 if any of these occur:    Trouble breathing    Chest pain    Confused    Severe drowsiness or trouble awakening    Fainting or loss of consciousness    Rapid heart rate    Seizure           Stiff neck    5. Follow-up with PCP or UC if any of these occur:     Abdominal pain that gets worse    Continued vomiting (unable to keep liquids down)    Frequent diarrhea (more than 5 times a day)    Blood in vomit or stool (black or red color)    Dark urine, reduced urine output, or extreme thirst    Weakness or dizziness    Drowsiness    Fever of 100.4 F (38 C) oral or higher that does not get better with fever medicine           New rash    Also, as we discussed today, if you do not get your menstrual period in the next one week, you should have another pregnancy test.     We discussed a urine test to screen for bladder infection and a Strep test to screen for Strep Throat today but you chose not to have those tests done today.     (R11.2) Nausea with vomiting  Plan: HCG qualitative urine      (N91.2) Amenorrhea

## 2019-11-26 ENCOUNTER — HOSPITAL ENCOUNTER (EMERGENCY)
Facility: CLINIC | Age: 34
Discharge: HOME | End: 2019-11-27
Attending: EMERGENCY MEDICINE | Admitting: EMERGENCY MEDICINE
Payer: COMMERCIAL

## 2019-11-26 ENCOUNTER — APPOINTMENT (OUTPATIENT)
Dept: ULTRASOUND IMAGING | Facility: CLINIC | Age: 34
End: 2019-11-26
Attending: EMERGENCY MEDICINE
Payer: COMMERCIAL

## 2019-11-26 DIAGNOSIS — R10.9 ABDOMINAL PAIN, LEFT LATERAL: ICD-10-CM

## 2019-11-26 DIAGNOSIS — Z32.01 PREGNANCY TEST POSITIVE: ICD-10-CM

## 2019-11-26 LAB
ALBUMIN SERPL-MCNC: 3.7 G/DL (ref 3.4–5)
ALBUMIN UR-MCNC: NEGATIVE MG/DL
ALP SERPL-CCNC: 61 U/L (ref 40–150)
ALT SERPL W P-5'-P-CCNC: 19 U/L (ref 0–50)
ANION GAP SERPL CALCULATED.3IONS-SCNC: 5 MMOL/L (ref 3–14)
APPEARANCE UR: CLEAR
AST SERPL W P-5'-P-CCNC: 18 U/L (ref 0–45)
B-HCG FREE SERPL-ACNC: >2000 IU/L
B-HCG SERPL-ACNC: 4737 IU/L (ref 0–5)
BACTERIA #/AREA URNS HPF: ABNORMAL /HPF
BASOPHILS # BLD AUTO: 0.1 10E9/L (ref 0–0.2)
BASOPHILS NFR BLD AUTO: 0.4 %
BILIRUB SERPL-MCNC: 0.1 MG/DL (ref 0.2–1.3)
BILIRUB UR QL STRIP: NEGATIVE
BUN SERPL-MCNC: 15 MG/DL (ref 7–30)
CALCIUM SERPL-MCNC: 9.1 MG/DL (ref 8.5–10.1)
CHLORIDE SERPL-SCNC: 107 MMOL/L (ref 94–109)
CO2 SERPL-SCNC: 26 MMOL/L (ref 20–32)
COLOR UR AUTO: ABNORMAL
CREAT SERPL-MCNC: 0.73 MG/DL (ref 0.52–1.04)
DEPRECATED S PYO AG THROAT QL EIA: NORMAL
DIFFERENTIAL METHOD BLD: ABNORMAL
EOSINOPHIL # BLD AUTO: 0.8 10E9/L (ref 0–0.7)
EOSINOPHIL NFR BLD AUTO: 6 %
ERYTHROCYTE [DISTWIDTH] IN BLOOD BY AUTOMATED COUNT: 12.5 % (ref 10–15)
FLUAV+FLUBV AG SPEC QL: NEGATIVE
FLUAV+FLUBV AG SPEC QL: NEGATIVE
GFR SERPL CREATININE-BSD FRML MDRD: >90 ML/MIN/{1.73_M2}
GLUCOSE SERPL-MCNC: 81 MG/DL (ref 70–99)
GLUCOSE UR STRIP-MCNC: NEGATIVE MG/DL
HCT VFR BLD AUTO: 43.5 % (ref 35–47)
HGB BLD-MCNC: 14 G/DL (ref 11.7–15.7)
HGB UR QL STRIP: ABNORMAL
IMM GRANULOCYTES # BLD: 0.1 10E9/L (ref 0–0.4)
IMM GRANULOCYTES NFR BLD: 0.4 %
KETONES UR STRIP-MCNC: NEGATIVE MG/DL
LACTATE BLD-SCNC: 1 MMOL/L (ref 0.7–2)
LEUKOCYTE ESTERASE UR QL STRIP: ABNORMAL
LIPASE SERPL-CCNC: 123 U/L (ref 73–393)
LYMPHOCYTES # BLD AUTO: 4.3 10E9/L (ref 0.8–5.3)
LYMPHOCYTES NFR BLD AUTO: 34.4 %
MCH RBC QN AUTO: 28.6 PG (ref 26.5–33)
MCHC RBC AUTO-ENTMCNC: 32.2 G/DL (ref 31.5–36.5)
MCV RBC AUTO: 89 FL (ref 78–100)
MONOCYTES # BLD AUTO: 0.7 10E9/L (ref 0–1.3)
MONOCYTES NFR BLD AUTO: 5.9 %
MUCOUS THREADS #/AREA URNS LPF: PRESENT /LPF
NEUTROPHILS # BLD AUTO: 6.6 10E9/L (ref 1.6–8.3)
NEUTROPHILS NFR BLD AUTO: 52.9 %
NITRATE UR QL: NEGATIVE
NRBC # BLD AUTO: 0 10*3/UL
NRBC BLD AUTO-RTO: 0 /100
PH UR STRIP: 6 PH (ref 5–7)
PLATELET # BLD AUTO: 254 10E9/L (ref 150–450)
POTASSIUM SERPL-SCNC: 3.8 MMOL/L (ref 3.4–5.3)
PROT SERPL-MCNC: 8.2 G/DL (ref 6.8–8.8)
RBC # BLD AUTO: 4.89 10E12/L (ref 3.8–5.2)
RBC #/AREA URNS AUTO: 1 /HPF (ref 0–2)
SODIUM SERPL-SCNC: 138 MMOL/L (ref 133–144)
SOURCE: ABNORMAL
SP GR UR STRIP: 1.01 (ref 1–1.03)
SPECIMEN SOURCE: NORMAL
SPECIMEN SOURCE: NORMAL
SQUAMOUS #/AREA URNS AUTO: 1 /HPF (ref 0–1)
TROPONIN I SERPL-MCNC: <0.015 UG/L (ref 0–0.04)
UROBILINOGEN UR STRIP-MCNC: NORMAL MG/DL (ref 0–2)
WBC # BLD AUTO: 12.4 10E9/L (ref 4–11)
WBC #/AREA URNS AUTO: 3 /HPF (ref 0–5)

## 2019-11-26 PROCEDURE — 87880 STREP A ASSAY W/OPTIC: CPT | Performed by: EMERGENCY MEDICINE

## 2019-11-26 PROCEDURE — 87804 INFLUENZA ASSAY W/OPTIC: CPT | Performed by: EMERGENCY MEDICINE

## 2019-11-26 PROCEDURE — 81001 URINALYSIS AUTO W/SCOPE: CPT | Performed by: EMERGENCY MEDICINE

## 2019-11-26 PROCEDURE — 83690 ASSAY OF LIPASE: CPT | Performed by: EMERGENCY MEDICINE

## 2019-11-26 PROCEDURE — 84484 ASSAY OF TROPONIN QUANT: CPT | Performed by: EMERGENCY MEDICINE

## 2019-11-26 PROCEDURE — 84702 CHORIONIC GONADOTROPIN TEST: CPT | Mod: 59 | Performed by: EMERGENCY MEDICINE

## 2019-11-26 PROCEDURE — 87081 CULTURE SCREEN ONLY: CPT | Performed by: EMERGENCY MEDICINE

## 2019-11-26 PROCEDURE — 84702 CHORIONIC GONADOTROPIN TEST: CPT

## 2019-11-26 PROCEDURE — 80053 COMPREHEN METABOLIC PANEL: CPT | Performed by: EMERGENCY MEDICINE

## 2019-11-26 PROCEDURE — 25800030 ZZH RX IP 258 OP 636: Performed by: EMERGENCY MEDICINE

## 2019-11-26 PROCEDURE — 76801 OB US < 14 WKS SINGLE FETUS: CPT

## 2019-11-26 PROCEDURE — 96360 HYDRATION IV INFUSION INIT: CPT

## 2019-11-26 PROCEDURE — 99285 EMERGENCY DEPT VISIT HI MDM: CPT | Mod: 25

## 2019-11-26 PROCEDURE — 93005 ELECTROCARDIOGRAM TRACING: CPT

## 2019-11-26 PROCEDURE — 83605 ASSAY OF LACTIC ACID: CPT | Performed by: EMERGENCY MEDICINE

## 2019-11-26 PROCEDURE — 85025 COMPLETE CBC W/AUTO DIFF WBC: CPT | Performed by: EMERGENCY MEDICINE

## 2019-11-26 PROCEDURE — 25000132 ZZH RX MED GY IP 250 OP 250 PS 637: Performed by: EMERGENCY MEDICINE

## 2019-11-26 RX ORDER — SODIUM CHLORIDE 9 MG/ML
1000 INJECTION, SOLUTION INTRAVENOUS CONTINUOUS
Status: DISCONTINUED | OUTPATIENT
Start: 2019-11-26 | End: 2019-11-27 | Stop reason: HOSPADM

## 2019-11-26 RX ORDER — ACETAMINOPHEN 500 MG
500 TABLET ORAL EVERY 4 HOURS PRN
Status: DISCONTINUED | OUTPATIENT
Start: 2019-11-26 | End: 2019-11-27 | Stop reason: HOSPADM

## 2019-11-26 RX ADMIN — SODIUM CHLORIDE 1000 ML: 9 INJECTION, SOLUTION INTRAVENOUS at 22:38

## 2019-11-26 RX ADMIN — ACETAMINOPHEN 500 MG: 500 TABLET, FILM COATED ORAL at 22:52

## 2019-11-26 NOTE — ED AVS SNAPSHOT
[facility]  [address]  [facility] [address]  Phone:  [phone]  Fax:  [phone]                                    Kayla Howell   MRN: [id number]    Department:  [facility]   Date of Visit:  11/26/2019           After Visit Summary Signature Page    I have received my discharge instructions, and my questions have been answered. I have discussed any challenges I see with this plan with the nurse or doctor.    ..........................................................................................................................................  Patient/Patient Representative Signature      ..........................................................................................................................................  Patient Representative Print Name and Relationship to Patient    ..................................................               ................................................  Date                                   Time    ..........................................................................................................................................  Reviewed by Signature/Title    ...................................................              ..............................................  Date                                               Time          22EPIC Rev 08/18

## 2019-11-27 VITALS
RESPIRATION RATE: 18 BRPM | DIASTOLIC BLOOD PRESSURE: 73 MMHG | OXYGEN SATURATION: 99 % | TEMPERATURE: 97.7 F | SYSTOLIC BLOOD PRESSURE: 115 MMHG | BODY MASS INDEX: 29.95 KG/M2 | WEIGHT: 180 LBS | HEART RATE: 81 BPM

## 2019-11-27 LAB — INTERPRETATION ECG - MUSE: NORMAL

## 2019-11-27 ASSESSMENT — ENCOUNTER SYMPTOMS
ABDOMINAL PAIN: 1
SORE THROAT: 1
DIZZINESS: 1
BACK PAIN: 1
SHORTNESS OF BREATH: 1
FEVER: 1
CONSTIPATION: 0
DIFFICULTY URINATING: 0
DIARRHEA: 0
DYSURIA: 0
COUGH: 1
VOMITING: 0

## 2019-11-27 NOTE — ED PROVIDER NOTES
History     Chief Complaint:  Flu Symptoms      HPI   Kayla Howell is a 34 year old female who presents to the emergency department for evaluation of flu symptoms. The patient reports 4 days of intermittent diffuse abdominal cramping, localized on the left side, bilateral back pain, shortness of breath, sore throat, fever, cough, and congestion. The patient also notes developing dizziness today, which is exacerbated when she moves her head. She denies vomiting, or abnormal bladder or bowel symptoms. The patient states she never felt like this before. The patient reports it has been one month since her last menstrual period.     Allergies:  NKDA     Medications:    Levothyroxine  Omeprazole     Past Medical History:    Gestational diabetes   Hypothyroidism   Asthma  Obesity    Past Surgical History:    The patient does not have any pertinent past surgical history.    Family History:    No past pertinent family history.    Social History:  The patient was unaccompanied to the ED.  Smoking Status: Never Smoker  Smokeless Tobacco: Never Used  Alcohol Use: Negative   Drug Use: Negative   Marital Status:   [2]     Review of Systems   Constitutional: Positive for fever.   HENT: Positive for congestion and sore throat.    Respiratory: Positive for cough and shortness of breath.    Gastrointestinal: Positive for abdominal pain. Negative for constipation, diarrhea and vomiting.   Genitourinary: Negative for difficulty urinating and dysuria.   Musculoskeletal: Positive for back pain.   Neurological: Positive for dizziness.   All other systems reviewed and are negative.        Physical Exam   First Vitals:  BP: 110/72  Pulse: 79  Heart Rate: 79  Temp: 97.7  F (36.5  C)  Resp: 18  Weight: 81.6 kg (180 lb)  SpO2: 99 %      Physical Exam  General: The patient is alert, in no respiratory distress.    HENT: Mucous membranes moist. Fluid behind both TMS, right greater than left. Throat is red.     Cardiovascular:  Regular rate and rhythm. Good pulses in all four extremities. Normal capillary refill and skin turgor.     Respiratory: Lungs are clear. No nasal flaring. No retractions. No wheezing, no crackles.    Gastrointestinal: Abdomen soft. No guarding, no rebound. No palpable hernias. Left-sided abdominal tenderness.    Musculoskeletal: No gross deformity.     Skin: No rashes or petechiae.     Neurologic: The patient is alert and oriented x3. GCS 15. No testable cranial nerve deficit. Follows commands with clear and appropriate speech. Gives appropriate answers. Good strength in all extremities. No gross neurologic deficit. Gross sensation intact. Pupils are round and reactive. No meningismus.     Lymphatic: Cervical lymphadenopathy. No lower extremity swelling.    Psychiatric: The patient is non-tearful.      Emergency Department Course   ECG:  Time: 5  Vent. Rate 71 bpm. DC interval 152. QRS duration 70. QT/QTc 390/423. P-R-T axis 65 25 24.  Normal sinus rhythm with sinus arrhythmia   Low voltage QRS  Read time:       Imaging:  Radiographic findings were communicated with the patient who voiced understanding of the findings.    OB US 1st trimester w transvag  IMPRESSION:   1.  No definite products of conception are visualized in the endometrial cavity. In the setting of a positive pregnancy test, the differential diagnosis includes early intrauterine pregnancy, spontaneous , and ectopic pregnancy. Recommend   correlation with beta hCG levels and follow-up ultrasound as clinically indicated. Of note, there is a small FDG fluid collection in the endometrial cavity could represent a very early gestational sac.  2.  Unremarkable appearance of the right ovary. The left ovary is not visualized.  3.  A small amount of nonspecific free fluid in the pelvis. As per Radiology.      Laboratory:  CBC: WBC: 12.4 (H), HGB 14.0, PLT: 254    CMP: Bilirubin Total 0.1 (L), o/w WNL (Creatinine: 0.73)    HCG Quantitative: 4737  (H)    ISTAT Quantitative: >2000 (H)    Lipase: 123    Influenza A/B antigen: both Negative    Rapid strep screen: Negative     Lactic acid whole blood: 1.0    2235 Troponin I: <0.015    UA: Blood Trace, Leukocyte Esterase Small, Bacteria Few, Mucous Present, o/w WNL    Interventions:  2238 NS 1000 mL IV  2252 Tylenol 500 mg PO    Emergency Department Course:   Past medical records, nursing notes, and vitals reviewed.  1015: I performed an exam of the patient and obtained history, as documented above.    EKG obtained in the ED, see results above.     The patient was sent for a OB US 1st Trimester while in the emergency department, results above.     IV was inserted and blood was drawn for laboratory testing, results above. The patient was tested for Influenza and Strep Throat while in the emergency department.    The patient provided a urine sample here in the emergency department. This was sent for laboratory testing, findings above.      0100 I rechecked and updated the patient.     Findings and plan explained to the Patient. Patient discharged home with instructions regarding supportive care, medications, and reasons to return. The importance of close follow-up was reviewed.    I personally reviewed the laboratory results with the Patient and answered all related questions prior to discharge.      Impression & Plan        Medical Decision Making:  The patient presented with several symptoms including abdominal pain back pain mainly on left.  She also had some muscle aches complains of some mild chest pain but did not appear to be in respiratory distress, the main reason that she presented tonight was some sensation of spinning I did find some small fluid behind both TMs but felt that her symptoms were quite mild.  However with the abdominal complaints pregnancy was on the differential and she in fact is pregnant though she did not know this.  The patient's quant is deftly positive and on ultrasound they do see  something that is the appropriate number of weeks but is not definitely an IUP.  I did stress to the patient that we have not excluded an ectopic pregnancy but she is currently stable without vaginal bleeding.  Her previous Rh type was positive.  Her labs otherwise look reassuring she has a benign abdominal exam does not suggest bleeding.  She is stable and comfortable with the plan I did stress that she should return sooner than 2 days if she develops any worsening pain or other symptoms.  I did consider appendicitis bowel obstruction but cannot find signs to suggest this.  The patient will follow-up with her OB in 2 days return sooner if problems.  Diagnosis:    ICD-10-CM    1. Abdominal pain, left lateral R10.9    2. Pregnancy test positive Z32.01        Disposition:  discharged to home    I, Natalie Okeefe, am serving as a scribe on 11/26/2019 at 10:42 PM to personally document services performed by Alex Barrios MD based on my observations and the provider's statements to me.       Natalie Okeefe  11/26/2019   [facility] EMERGENCY DEPARTMENT       Alex Barrios MD  11/27/19 0111

## 2019-11-27 NOTE — DISCHARGE INSTRUCTIONS
Discharge Instructions  Abdominal Pain    Abdominal pain can be caused by many things. Your evaluation today does not show the exact cause for your pain. Your doctor today has decided that it is unlikely your pain is due to a life threatening problem, or a problem requiring surgery or hospital admission. Sometimes those problems cannot be found right away, so it is very important that you follow up as directed.  Sometimes only the changes which occur over time allow the cause of your pain to be found.    Return to the Emergency Department for a recheck in 8-12 hours if your pain continues.  If your pain gets worse, changes in location, or feels different, return to the Emergency Department right away.    ADULTS:  Return to the Emergency Department right away if:    You get an oral temperature above 102oF or as directed by your doctor.  You have blood in your stools (bright red or black, tarry stools).  You keep throwing up or can t drink liquids.  You see blood when you throw up.  You can t have a bowel movement or you can t pass gas.  Your stomach gets bloated or bigger.  Your skin or the whites of your eyes look yellow.  You faint.  You have bloody, frequent or painful urination.  You have new symptoms or anything that worries you.    CHILDREN:  Return to the Emergency Department right away if your child has any of the above-listed symptoms or the following:    Pushes your hand away or screams/cries when his/her belly is touched.  You notice your child is very fussy or weak.  Your child is very tired and is too tired to eat or drink.  Your child is dehydrated.  Signs of dehydration can be:  Your infant has had no wet diapers in 4-5 hours.  Your older child has not passed urine in 6-8 hours.  Your infant or child starts to have dry mouth and lips, or no saliva or tears.    PREGNANT WOMEN:  Return to the Emergency Department right away if you have any of the above-listed symptoms or the following:    You have  bleeding, leaking fluid or passing tissue from the vagina.  You have worse pain or cramping, or pain in your shoulder or back.  You have vomiting that will not stop.  You have painful or bloody urination.  You have a temperature of 100oF or more.  Your baby is not moving as much as usual.  You faint.  You get a bad headache with or without eye problems and abdominal pain.  You have a convulsion or seizure.  You have unusual discharge from your vagina and abdominal pain.    Abdominal pain is pretty common during pregnancy.  Your pain may or may not be related to your pregnancy. You should follow-up closely with your OB doctor so they can evaluate you and your baby.  Until you follow-up with your regular doctor, do the following:     Avoid sex and do not put anything in your vagina.  Drink clear fluids.  Only take medications approved by your doctor.    MORE INFORMATION:    Appendicitis:  A possible cause of abdominal pain in any person who still has their appendix is acute appendicitis. Appendicitis is often hard to diagnose.  Testing does not always rule out early appendicitis or other causes of abdominal pain. Close follow-up with your doctor and re-evaluations may be needed to figure out the reason for your abdominal pain.    Follow-up:  It is very important that you make an appointment with your clinic and go to the appointment.  If you do not follow-up with your primary doctor, it may result in missing an important development which could result in permanent injury or disability and/or lasting pain.  If there is any problem keeping your appointment, call your doctor or return to the Emergency Department.    Medications:  Take your medications as directed by your doctor today.  Before using over-the-counter medications, ask your doctor and make sure to take the medications as directed.  If you have any questions about medications, ask your doctor.    Diet:  Resume your normal diet as much as possible, but do not  "eat fried, fatty or spicy foods while you have pain.  Do not drink alcohol or have caffeine.  Do not smoke tobacco.    Probiotics: If you have been given an antibiotic, you may want to also take a probiotic pill or eat yogurt with live cultures. Probiotics have \"good bacteria\" to help your intestines stay healthy. Studies have shown that probiotics help prevent diarrhea and other intestine problems (including C. diff infection) when you take antibiotics. You can buy these without a prescription in the pharmacy section of the store.     If you were given a prescription for medicine here today, be sure to read all of the information (including the package insert) that comes with your prescription.  This will include important information about the medicine, its side effects, and any warnings that you need to know about.  The pharmacist who fills the prescription can provide more information and answer questions you may have about the medicine.  If you have questions or concerns that the pharmacist cannot address, please call or return to the Emergency Department.         Opioid Medication Information    Pain medications are among the most commonly prescribed medicines, so we are including this information for all our patients. If you did not receive pain medication or get a prescription for pain medicine, you can ignore it.     You may have been given a prescription for an opioid (narcotic) pain medicine and/or have received a pain medicine while here in the Emergency Department. These medicines can make you drowsy or impaired. You must not drive, operate dangerous equipment, or engage in any other dangerous activities while taking these medications. If you drive while taking these medications, you could be arrested for DUI, or driving under the influence. Do not drink any alcohol while you are taking these medications.     Opioid pain medications can cause addiction. If you have a history of chemical dependency of " any type, you are at a higher risk of becoming addicted to pain medications.  Only take these prescribed medications to treat your pain when all other options have been tried. Take it for as short a time and as few doses as possible. Store your pain pills in a secure place, as they are frequently stolen and provide a dangerous opportunity for children or visitors in your house to start abusing these powerful medications. We will not replace any lost or stolen medicine.  As soon as your pain is better, you should flush all your remaining medication.     Many prescription pain medications contain Tylenol  (acetaminophen), including Vicodin , Tylenol #3 , Norco , Lortab , and Percocet .  You should not take any extra pills of Tylenol  if you are using these prescription medications or you can get very sick.  Do not ever take more than 3000 mg of acetaminophen in any 24 hour period.    All opioids tend to cause constipation. Drink plenty of water and eat foods that have a lot of fiber, such as fruits, vegetables, prune juice, apple juice and high fiber cereal.  Take a laxative if you don t move your bowels at least every other day. Miralax , Milk of Magnesia, Colace , or Senna  can be used to keep you regular.      Remember that you can always come back to the Emergency Department if you are not able to see your regular doctor in the amount of time listed above, if you get any new symptoms, or if there is anything that worries you.    This could still be an pregnancy outside the uterus, SO YOU MUST FOLLOW UP IN 2 DAYS WITH YOUR OB FOR A RECHECK

## 2019-11-29 LAB
BACTERIA SPEC CULT: NORMAL
SPECIMEN SOURCE: NORMAL

## 2019-11-29 NOTE — RESULT ENCOUNTER NOTE
Final Beta strep group A r/o culture is NEGATIVE for Group A streptococcus.    No treatment or change in treatment per [facility] Strep protocol.

## 2020-02-24 ENCOUNTER — HOSPITAL ENCOUNTER (EMERGENCY)
Facility: CLINIC | Age: 35
Discharge: HOME | End: 2020-02-24
Attending: EMERGENCY MEDICINE | Admitting: EMERGENCY MEDICINE
Payer: MEDICAID

## 2020-02-24 VITALS
WEIGHT: 203.04 LBS | RESPIRATION RATE: 16 BRPM | OXYGEN SATURATION: 100 % | BODY MASS INDEX: 33.79 KG/M2 | DIASTOLIC BLOOD PRESSURE: 53 MMHG | TEMPERATURE: 98.4 F | SYSTOLIC BLOOD PRESSURE: 107 MMHG | HEART RATE: 91 BPM

## 2020-02-24 DIAGNOSIS — O26.892 ABDOMINAL PAIN DURING PREGNANCY, SECOND TRIMESTER: ICD-10-CM

## 2020-02-24 DIAGNOSIS — R10.9 ABDOMINAL PAIN DURING PREGNANCY, SECOND TRIMESTER: ICD-10-CM

## 2020-02-24 LAB
ALBUMIN SERPL-MCNC: 2.6 G/DL (ref 3.4–5)
ALBUMIN UR-MCNC: NEGATIVE MG/DL
ALP SERPL-CCNC: 79 U/L (ref 40–150)
ALT SERPL W P-5'-P-CCNC: 16 U/L (ref 0–50)
AMORPH CRY #/AREA URNS HPF: ABNORMAL /HPF
ANION GAP SERPL CALCULATED.3IONS-SCNC: 3 MMOL/L (ref 3–14)
APPEARANCE UR: ABNORMAL
AST SERPL W P-5'-P-CCNC: 14 U/L (ref 0–45)
BASOPHILS # BLD AUTO: 0 10E9/L (ref 0–0.2)
BASOPHILS NFR BLD AUTO: 0.4 %
BILIRUB SERPL-MCNC: 0.2 MG/DL (ref 0.2–1.3)
BILIRUB UR QL STRIP: NEGATIVE
BUN SERPL-MCNC: 8 MG/DL (ref 7–30)
CALCIUM SERPL-MCNC: 9.3 MG/DL (ref 8.5–10.1)
CHLORIDE SERPL-SCNC: 108 MMOL/L (ref 94–109)
CO2 SERPL-SCNC: 26 MMOL/L (ref 20–32)
COLOR UR AUTO: YELLOW
CREAT SERPL-MCNC: 0.66 MG/DL (ref 0.52–1.04)
DIFFERENTIAL METHOD BLD: NORMAL
EOSINOPHIL # BLD AUTO: 0.7 10E9/L (ref 0–0.7)
EOSINOPHIL NFR BLD AUTO: 6.8 %
ERYTHROCYTE [DISTWIDTH] IN BLOOD BY AUTOMATED COUNT: 12.8 % (ref 10–15)
GFR SERPL CREATININE-BSD FRML MDRD: >90 ML/MIN/{1.73_M2}
GLUCOSE SERPL-MCNC: 72 MG/DL (ref 70–99)
GLUCOSE UR STRIP-MCNC: NEGATIVE MG/DL
HCT VFR BLD AUTO: 37.2 % (ref 35–47)
HGB BLD-MCNC: 12.4 G/DL (ref 11.7–15.7)
HGB UR QL STRIP: NEGATIVE
IMM GRANULOCYTES # BLD: 0 10E9/L (ref 0–0.4)
IMM GRANULOCYTES NFR BLD: 0.3 %
KETONES UR STRIP-MCNC: NEGATIVE MG/DL
LEUKOCYTE ESTERASE UR QL STRIP: NEGATIVE
LIPASE SERPL-CCNC: 111 U/L (ref 73–393)
LYMPHOCYTES # BLD AUTO: 2.5 10E9/L (ref 0.8–5.3)
LYMPHOCYTES NFR BLD AUTO: 23.2 %
MCH RBC QN AUTO: 29.7 PG (ref 26.5–33)
MCHC RBC AUTO-ENTMCNC: 33.3 G/DL (ref 31.5–36.5)
MCV RBC AUTO: 89 FL (ref 78–100)
MONOCYTES # BLD AUTO: 0.5 10E9/L (ref 0–1.3)
MONOCYTES NFR BLD AUTO: 4.9 %
MUCOUS THREADS #/AREA URNS LPF: PRESENT /LPF
NEUTROPHILS # BLD AUTO: 7 10E9/L (ref 1.6–8.3)
NEUTROPHILS NFR BLD AUTO: 64.4 %
NITRATE UR QL: NEGATIVE
NRBC # BLD AUTO: 0 10*3/UL
NRBC BLD AUTO-RTO: 0 /100
PH UR STRIP: 7.5 PH (ref 5–7)
PLATELET # BLD AUTO: 221 10E9/L (ref 150–450)
POTASSIUM SERPL-SCNC: 3.8 MMOL/L (ref 3.4–5.3)
PROT SERPL-MCNC: 7 G/DL (ref 6.8–8.8)
RBC # BLD AUTO: 4.18 10E12/L (ref 3.8–5.2)
RBC #/AREA URNS AUTO: 2 /HPF (ref 0–2)
SODIUM SERPL-SCNC: 137 MMOL/L (ref 133–144)
SOURCE: ABNORMAL
SP GR UR STRIP: 1.02 (ref 1–1.03)
UROBILINOGEN UR STRIP-MCNC: NORMAL MG/DL (ref 0–2)
WBC # BLD AUTO: 10.8 10E9/L (ref 4–11)
WBC #/AREA URNS AUTO: 4 /HPF (ref 0–5)

## 2020-02-24 PROCEDURE — 81001 URINALYSIS AUTO W/SCOPE: CPT | Performed by: EMERGENCY MEDICINE

## 2020-02-24 PROCEDURE — 83690 ASSAY OF LIPASE: CPT | Performed by: EMERGENCY MEDICINE

## 2020-02-24 PROCEDURE — 80053 COMPREHEN METABOLIC PANEL: CPT | Performed by: EMERGENCY MEDICINE

## 2020-02-24 PROCEDURE — 85025 COMPLETE CBC W/AUTO DIFF WBC: CPT | Performed by: EMERGENCY MEDICINE

## 2020-02-24 PROCEDURE — 99283 EMERGENCY DEPT VISIT LOW MDM: CPT

## 2020-02-24 ASSESSMENT — ENCOUNTER SYMPTOMS
CONSTIPATION: 0
ABDOMINAL PAIN: 1
BACK PAIN: 1
DIARRHEA: 0
VOMITING: 0

## 2020-02-24 NOTE — ED AVS SNAPSHOT
[facility] Emergency Department  [address]  [facility] [address]  Phone:  [phone]  Fax:  [phone]                                    Kayla Howell   MRN: [id number]    Department:  [facility] Emergency Department   Date of Visit:  2/24/2020           After Visit Summary Signature Page    I have received my discharge instructions, and my questions have been answered. I have discussed any challenges I see with this plan with the nurse or doctor.    ..........................................................................................................................................  Patient/Patient Representative Signature      ..........................................................................................................................................  Patient Representative Print Name and Relationship to Patient    ..................................................               ................................................  Date                                   Time    ..........................................................................................................................................  Reviewed by Signature/Title    ...................................................              ..............................................  Date                                               Time          22EPIC Rev 08/18

## 2020-02-24 NOTE — ED TRIAGE NOTES
Patient comes in for evaluation of lower abdominal pain radiating into the back which started about a week ago. Patient notes she is 19 weeks pregnant. . Denies any other symptoms, urinary or bowel changes. ABCs intact.

## 2020-02-24 NOTE — ED PROVIDER NOTES
History     Chief Complaint:    Abdominal Pain    The history is provided by the patient.      Kayla Howell is a 35 year old female who is 19 weeks pregnant  who presents with lower abdominal pain. The patient reports that her pain began 1 week ago and radiates into her back. She denies vomiting, diarrhea, dysuria, constipation, vaginal bleeding, or vaginal discharge. Her obgyn is at [facility] and the patient last saw them on 2020. The patient mentions that she feels larger at this far along than she did in past pregnancies.      Allergies:  No Known Drug Allergies     Medications:    Ferrous sulfate  Levothyroxine sodium  Omeprazole  Unisom  Vitamin B6    Past Medical History:    Obesity  Hypothyroidism  GERD  Gestational diabetes  Adiposity  Moderate persistent asthma  Vitamin D deficiency  Learning difficulty     Past Surgical History:    The patient does not have any pertinent past surgical history.    Family History:    No past pertinent family history.    Social History:  Negative for tobacco use.  Negative for alcohol use.  Negative for drug use.  Marital Status:   [2]     Review of Systems   Gastrointestinal: Positive for abdominal pain. Negative for constipation, diarrhea and vomiting.   Genitourinary: Negative for vaginal bleeding and vaginal discharge.   Musculoskeletal: Positive for back pain.   All other systems reviewed and are negative.      Physical Exam     Patient Vitals for the past 24 hrs:   BP Temp Temp src Pulse Resp SpO2 Weight   20 1157 107/53 98.4  F (36.9  C) Oral 91 16 100 % 92.1 kg (203 lb 0.7 oz)     Physical Exam    Nursing note and vitals reviewed.  Constitutional: Cooperative.   HENT:   Mouth/Throat: Moist mucous membranes.   Eyes: EOMI, nonicteric sclera  Cardiovascular: Normal rate, regular rhythm, no murmurs, rubs, or gallops  Pulmonary/Chest: Effort normal and breath sounds normal. No respiratory distress. No wheezes. No rales.   Abdominal: Soft.  Nontender, nondistended, no guarding or rigidity. Gravid uterus noted.  BS present.   Musculoskeletal: Normal range of motion.   Neurological: Alert. Moves all extremities spontaneously.   Skin: Skin is warm and dry. No rash noted.   Psychiatric: Normal mood and affect.     Emergency Department Course     Laboratory:  Laboratory findings were communicated with the patient who voiced understanding of the findings.    Lipase: 111  CMP: Albumin 2.6 L o/w WNL (Creatinine 0.66)  CBC: AWNL (WBC 10.8, HGB 12.4, )    UA: slightly cloudy, yellow urine, pH 7.5 H, mucous present, amorphous crystals moderate o/w negative    Emergency Department Course:  Past medical records, nursing notes, and vitals reviewed.    1210: I performed an exam of the patient as documented above.     IV was inserted and blood was drawn for laboratory testing, results above.    The patient provided a urine sample here in the emergency department. This was sent for laboratory testing, findings above.    1309: I rechecked the patient and discussed the results of her workup thus far. I performed a bedside ultrasound that showed normal fetal heart tones and fetal movement.    I personally reviewed the laboratory results with the Patient and answered all related questions prior to discharge.     Findings and plan explained to the Patient. Patient discharged home with instructions regarding supportive care, medications, and reasons to return. The importance of close follow-up was reviewed.     Impression & Plan     Medical Decision Making:  Patient presents with chief complaint abdominal pain associated with a second trimester pregnancy.  Her vital signs are normal.  Her abdominal exam is also quite normal without focal tenderness, guarding, or distention.  Patient reports concern about her pregnancy and I performed a bedside ultrasound which showed normal fetal heart tones and normal fetal activity.  Patient is not having any vaginal bleeding or  discharge to suggest any placental abruption or PID.  Patient is reassured with normal labs and ultrasound that no emergent process is ongoing.  Given her exam and work-up, doubt appendicitis, cholecystitis, pyelonephritis, or other emergent abdominal pathology.  Patient cautioned that she should return to emergency department immediately for any worsening of her symptoms, fevers, vaginal bleeding, vomiting, or for any other concerns.  Otherwise, follow-up with her OB/GYN as soon as possible this week for a recheck in clinic.        Diagnosis:    ICD-10-CM   1. Abdominal pain during pregnancy, second trimester O26.892    R10.9     Disposition:  Discharged to home.    Scribe Disclosure:  I, Phyllis Trotter, am serving as a scribe on 2/24/2020 at 12:11 PM to personally document services performed by Anjum Gamino MD based on my observations and the provider's statements to me.     Phyllis Trotter  2/24/2020   [facility] EMERGENCY DEPARTMENT       Anjum Gamino MD  02/24/20 9251

## 2020-07-28 ENCOUNTER — TRANSCRIBE ORDERS (OUTPATIENT)
Dept: OTHER | Age: 35
End: 2020-07-28

## 2020-07-28 DIAGNOSIS — N94.10 PAIN IN FEMALE GENITALIA ON INTERCOURSE: ICD-10-CM

## 2020-07-28 DIAGNOSIS — R10.2 VAGINAL PAIN: Primary | ICD-10-CM

## 2020-07-28 DIAGNOSIS — N90.810 FEMALE CIRCUMCISION: ICD-10-CM

## 2020-07-28 DIAGNOSIS — R10.2 VULVAR PAIN: ICD-10-CM

## 2020-10-12 ENCOUNTER — OFFICE VISIT (OUTPATIENT)
Dept: OBGYN | Facility: CLINIC | Age: 35
End: 2020-10-12
Attending: NURSE PRACTITIONER
Payer: COMMERCIAL

## 2020-10-12 VITALS
HEART RATE: 76 BPM | SYSTOLIC BLOOD PRESSURE: 95 MMHG | OXYGEN SATURATION: 97 % | WEIGHT: 166.5 LBS | DIASTOLIC BLOOD PRESSURE: 68 MMHG

## 2020-10-12 DIAGNOSIS — N89.8 VAGINAL DISCHARGE: ICD-10-CM

## 2020-10-12 DIAGNOSIS — R10.2 PELVIC PAIN IN FEMALE: ICD-10-CM

## 2020-10-12 DIAGNOSIS — Z11.3 SCREEN FOR STD (SEXUALLY TRANSMITTED DISEASE): Primary | ICD-10-CM

## 2020-10-12 LAB
SPECIMEN SOURCE: NORMAL
WET PREP SPEC: NORMAL

## 2020-10-12 PROCEDURE — 99204 OFFICE O/P NEW MOD 45 MIN: CPT | Performed by: OBSTETRICS & GYNECOLOGY

## 2020-10-12 PROCEDURE — 87491 CHLMYD TRACH DNA AMP PROBE: CPT | Performed by: OBSTETRICS & GYNECOLOGY

## 2020-10-12 PROCEDURE — 36415 COLL VENOUS BLD VENIPUNCTURE: CPT | Performed by: OBSTETRICS & GYNECOLOGY

## 2020-10-12 PROCEDURE — 87591 N.GONORRHOEAE DNA AMP PROB: CPT | Performed by: OBSTETRICS & GYNECOLOGY

## 2020-10-12 PROCEDURE — 87210 SMEAR WET MOUNT SALINE/INK: CPT | Performed by: OBSTETRICS & GYNECOLOGY

## 2020-10-12 NOTE — PATIENT INSTRUCTIONS
If you have any questions regarding your visit, Please contact your care team.    [facility]: [phone]      [facility] HOURS TELEPHONE NUMBER   Natividad Li DO.      Aubree -  Cherry -       OLGA Garnett, RN  Sarita, RN     Monday, Wednesday, Thursday and Friday[facility]  8:30a.m-5:00 p.m   [facility]  [address] [place] [address]  [phone] ask for [facility]    Imaging [phone]       Urgent Care locations:    [facility] Saturday and Sunday   9 am - 5 pm    Monday-Friday   12 pm - 8 pm  Saturday and Sunday   9 am - 5 pm   [phone]     [facility] Labor and Delivery:  [phone]    If you need a medication refill, please contact your pharmacy. Please allow 3 business days for your refill to be completed.  As always, Thank you for trusting us with your healthcare needs!

## 2020-10-12 NOTE — LETTER
October 15, 2020      Pat Veloz  [address] [place] [address]   [facility] [address]        Dear ,    We are writing to inform you of your test results.    Your test results for Gonorrhea and chlamydia test results were negative.     Resulted Orders   Wet prep   Result Value Ref Range    Specimen Description Vagina     Wet Prep No Trichomonas seen     Wet Prep No clue cells seen     Wet Prep No yeast seen     Wet Prep Moderate  WBC'S seen      Neisseria gonorrhoeae PCR   Result Value Ref Range    Specimen Descrip Cervix     N Gonorrhea PCR Negative NEG^Negative      Comment:      Negative for N. gonorrhoeae rRNA by transcription mediated amplification.  A negative result by transcription mediated amplification does not preclude   the presence of N. gonorrhoeae infection because results are dependent on   proper and adequate collection, absence of inhibitors, and sufficient rRNA to   be detected.     Chlamydia trachomatis PCR   Result Value Ref Range    Specimen Description Cervix     Chlamydia Trachomatis PCR Negative NEG^Negative      Comment:      Negative for C. trachomatis rRNA by transcription mediated amplification.  A negative result by transcription mediated amplification does not preclude   the presence of C. trachomatis infection because results are dependent on   proper and adequate collection, absence of inhibitors, and sufficient rRNA to   be detected.         If you have any questions or concerns, please call the clinic at the number listed above.       Sincerely,        Natividad Li, DO

## 2020-10-12 NOTE — PROGRESS NOTES
This 36 y/o female, , LMP 10/9/2020, presents as a new patient to the [place] g[facility] dept c/o numerous issues.  She does not speak or comprehend any English and the Martii is not in service today, so we used the clinic interpretation service on her cell phone (female Mauritanian).  She c/o dyspareunia coupled with low back pain which she complained about for years and was evaluated for this in [place] at [facility].  Her tests were normal per the patient so she was sent to a physical therapist for pelvic floor dysfunction which she has been doing since 2020.  However, the patient states that this is of no help and she stopped having relations with her  a couple of months ago.  She also c/o chronic low back pain and an increased amount of white sticky vaginal discharge.  She would like to be checked for STDs as well as a wet prep for yeast and BV.  She describes a hx of 6 NSVDs without complication but her first child  of dehydration in [place].  Per her [facility] medical records, her last gyn US was on 2017 and results were normal.  She wants all these problems to go away immediately so appears frustrated that this won't be accomplished today.  BP 95/68 (BP Location: Left arm, Patient Position: Chair, Cuff Size: Adult Regular)   Pulse 76   Wt 75.5 kg (166 lb 8 oz)   LMP 10/09/2020 (Exact Date)   SpO2 97%   Breastfeeding No   ROS:10 systems were reviewed and the positives were listed under problems.  PE:  General- Healthy-appearing female in no acute distress  Eyes- No scleral injection or icterus  ENT- Mucus membranes moist  CV- No noticeable edema in extremities  Lymph- No noticeable cervical adenopathy  Respiratory- Non-labored breathing  Skin- No suspicious lesions or rashes visualized  Psych- Normal affect  Pelvic- A bi-valve speculum was placed and her cervix and vaginal sidewalls appear completely normal.  There are no lesions or growths present.  A wet prep was obtained  and submitted after sampling a small amount of white vaginal discharge.  Her pelvic exam was normal without palpable mass or tenderness noted.  There was also no rebound nor guarding.    Assessment - Dyspareunia, STD screening, chronic pelvic pain, chronic abdominal pain, low back pain, and increased vaginal discharge  Plan - Will schedule her for a pelvic US.  However, she is unsure of her  availability so preferred to call back to do this at a later time.  I instructed her to go to the lab next for a blood draw.  Her GC/chlamydia and wet prep tests were taken to the lab.  She no longer wants to continue with PT - plans to quit.  If these tests are all normal, then the plan is to refer her to FP to assess for a possible GI and/or ortho etiology.  This was a 45-minute visit and over 50% of the time was spent in direct patient consultation.

## 2020-10-13 ENCOUNTER — APPOINTMENT (OUTPATIENT)
Dept: INTERPRETER SERVICES | Facility: CLINIC | Age: 35
End: 2020-10-13
Payer: COMMERCIAL

## 2020-10-13 LAB
C TRACH DNA SPEC QL NAA+PROBE: NEGATIVE
N GONORRHOEA DNA SPEC QL NAA+PROBE: NEGATIVE
SPECIMEN SOURCE: NORMAL
SPECIMEN SOURCE: NORMAL

## 2020-10-15 ENCOUNTER — APPOINTMENT (OUTPATIENT)
Dept: GENERAL RADIOLOGY | Facility: CLINIC | Age: 35
End: 2020-10-15
Attending: EMERGENCY MEDICINE
Payer: COMMERCIAL

## 2020-10-15 ENCOUNTER — HOSPITAL ENCOUNTER (EMERGENCY)
Facility: CLINIC | Age: 35
Discharge: HOME | End: 2020-10-16
Attending: EMERGENCY MEDICINE | Admitting: EMERGENCY MEDICINE
Payer: COMMERCIAL

## 2020-10-15 DIAGNOSIS — J45.901 EXACERBATION OF ASTHMA, UNSPECIFIED ASTHMA SEVERITY, UNSPECIFIED WHETHER PERSISTENT: ICD-10-CM

## 2020-10-15 LAB
ANION GAP SERPL CALCULATED.3IONS-SCNC: 8 MMOL/L (ref 3–14)
BASOPHILS # BLD AUTO: 0.1 10E9/L (ref 0–0.2)
BASOPHILS NFR BLD AUTO: 0.4 %
BUN SERPL-MCNC: 12 MG/DL (ref 7–30)
CALCIUM SERPL-MCNC: 8.6 MG/DL (ref 8.5–10.1)
CHLORIDE SERPL-SCNC: 107 MMOL/L (ref 94–109)
CO2 SERPL-SCNC: 25 MMOL/L (ref 20–32)
CREAT SERPL-MCNC: 0.6 MG/DL (ref 0.52–1.04)
D DIMER PPP FEU-MCNC: 0.3 UG/ML FEU (ref 0–0.5)
DIFFERENTIAL METHOD BLD: ABNORMAL
EOSINOPHIL # BLD AUTO: 0.8 10E9/L (ref 0–0.7)
EOSINOPHIL NFR BLD AUTO: 6.6 %
ERYTHROCYTE [DISTWIDTH] IN BLOOD BY AUTOMATED COUNT: 13.2 % (ref 10–15)
GFR SERPL CREATININE-BSD FRML MDRD: >90 ML/MIN/{1.73_M2}
GLUCOSE SERPL-MCNC: 108 MG/DL (ref 70–99)
HCG SERPL QL: NEGATIVE
HCT VFR BLD AUTO: 41.8 % (ref 35–47)
HGB BLD-MCNC: 12.8 G/DL (ref 11.7–15.7)
IMM GRANULOCYTES # BLD: 0 10E9/L (ref 0–0.4)
IMM GRANULOCYTES NFR BLD: 0.2 %
LYMPHOCYTES # BLD AUTO: 4.5 10E9/L (ref 0.8–5.3)
LYMPHOCYTES NFR BLD AUTO: 38.7 %
MCH RBC QN AUTO: 27.2 PG (ref 26.5–33)
MCHC RBC AUTO-ENTMCNC: 30.6 G/DL (ref 31.5–36.5)
MCV RBC AUTO: 89 FL (ref 78–100)
MONOCYTES # BLD AUTO: 0.6 10E9/L (ref 0–1.3)
MONOCYTES NFR BLD AUTO: 5 %
NEUTROPHILS # BLD AUTO: 5.8 10E9/L (ref 1.6–8.3)
NEUTROPHILS NFR BLD AUTO: 49.1 %
NRBC # BLD AUTO: 0 10*3/UL
NRBC BLD AUTO-RTO: 0 /100
PLATELET # BLD AUTO: 289 10E9/L (ref 150–450)
POTASSIUM SERPL-SCNC: 3.4 MMOL/L (ref 3.4–5.3)
RBC # BLD AUTO: 4.71 10E12/L (ref 3.8–5.2)
SODIUM SERPL-SCNC: 140 MMOL/L (ref 133–144)
TROPONIN I SERPL-MCNC: <0.015 UG/L (ref 0–0.04)
TSH SERPL DL<=0.005 MIU/L-ACNC: 3.42 MU/L (ref 0.4–4)
WBC # BLD AUTO: 11.7 10E9/L (ref 4–11)

## 2020-10-15 PROCEDURE — 84443 ASSAY THYROID STIM HORMONE: CPT | Performed by: EMERGENCY MEDICINE

## 2020-10-15 PROCEDURE — 84703 CHORIONIC GONADOTROPIN ASSAY: CPT | Performed by: EMERGENCY MEDICINE

## 2020-10-15 PROCEDURE — 80048 BASIC METABOLIC PNL TOTAL CA: CPT | Performed by: EMERGENCY MEDICINE

## 2020-10-15 PROCEDURE — 93005 ELECTROCARDIOGRAM TRACING: CPT

## 2020-10-15 PROCEDURE — 71045 X-RAY EXAM CHEST 1 VIEW: CPT

## 2020-10-15 PROCEDURE — U0003 INFECTIOUS AGENT DETECTION BY NUCLEIC ACID (DNA OR RNA); SEVERE ACUTE RESPIRATORY SYNDROME CORONAVIRUS 2 (SARS-COV-2) (CORONAVIRUS DISEASE [COVID-19]), AMPLIFIED PROBE TECHNIQUE, MAKING USE OF HIGH THROUGHPUT TECHNOLOGIES AS DESCRIBED BY CMS-2020-01-R: HCPCS | Performed by: EMERGENCY MEDICINE

## 2020-10-15 PROCEDURE — 250N000009 HC RX 250: Performed by: EMERGENCY MEDICINE

## 2020-10-15 PROCEDURE — 96374 THER/PROPH/DIAG INJ IV PUSH: CPT

## 2020-10-15 PROCEDURE — 84484 ASSAY OF TROPONIN QUANT: CPT | Performed by: EMERGENCY MEDICINE

## 2020-10-15 PROCEDURE — 94640 AIRWAY INHALATION TREATMENT: CPT

## 2020-10-15 PROCEDURE — 85379 FIBRIN DEGRADATION QUANT: CPT | Performed by: EMERGENCY MEDICINE

## 2020-10-15 PROCEDURE — C9803 HOPD COVID-19 SPEC COLLECT: HCPCS

## 2020-10-15 PROCEDURE — 85025 COMPLETE CBC W/AUTO DIFF WBC: CPT | Performed by: EMERGENCY MEDICINE

## 2020-10-15 PROCEDURE — 99285 EMERGENCY DEPT VISIT HI MDM: CPT | Mod: 25

## 2020-10-15 PROCEDURE — 250N000011 HC RX IP 250 OP 636: Performed by: EMERGENCY MEDICINE

## 2020-10-15 RX ORDER — IPRATROPIUM BROMIDE AND ALBUTEROL SULFATE 2.5; .5 MG/3ML; MG/3ML
3 SOLUTION RESPIRATORY (INHALATION)
Status: COMPLETED | OUTPATIENT
Start: 2020-10-15 | End: 2020-10-15

## 2020-10-15 RX ORDER — METHYLPREDNISOLONE SODIUM SUCCINATE 125 MG/2ML
125 INJECTION, POWDER, LYOPHILIZED, FOR SOLUTION INTRAMUSCULAR; INTRAVENOUS ONCE
Status: COMPLETED | OUTPATIENT
Start: 2020-10-15 | End: 2020-10-15

## 2020-10-15 RX ADMIN — IPRATROPIUM BROMIDE AND ALBUTEROL SULFATE 3 ML: .5; 3 SOLUTION RESPIRATORY (INHALATION) at 22:45

## 2020-10-15 RX ADMIN — METHYLPREDNISOLONE SODIUM SUCCINATE 125 MG: 125 INJECTION, POWDER, FOR SOLUTION INTRAMUSCULAR; INTRAVENOUS at 22:44

## 2020-10-15 RX ADMIN — IPRATROPIUM BROMIDE AND ALBUTEROL SULFATE 3 ML: .5; 3 SOLUTION RESPIRATORY (INHALATION) at 22:46

## 2020-10-15 NOTE — ED AVS SNAPSHOT
[facility] Emergency Dept  [address]  [facility] [address]  Phone: [phone]  Fax: [phone]                                    Kayla Howell   MRN: [id number]    Department: [facility] Emergency Dept   Date of Visit: 10/15/2020           After Visit Summary Signature Page    I have received my discharge instructions, and my questions have been answered. I have discussed any challenges I see with this plan with the nurse or doctor.    ..........................................................................................................................................  Patient/Patient Representative Signature      ..........................................................................................................................................  Patient Representative Print Name and Relationship to Patient    ..................................................               ................................................  Date                                   Time    ..........................................................................................................................................  Reviewed by Signature/Title    ...................................................              ..............................................  Date                                               Time          22EPIC Rev 08/18

## 2020-10-16 VITALS
HEART RATE: 69 BPM | DIASTOLIC BLOOD PRESSURE: 64 MMHG | RESPIRATION RATE: 20 BRPM | SYSTOLIC BLOOD PRESSURE: 111 MMHG | TEMPERATURE: 97.8 F | OXYGEN SATURATION: 98 %

## 2020-10-16 LAB
INTERPRETATION ECG - MUSE: NORMAL
SARS-COV-2 RNA SPEC QL NAA+PROBE: NOT DETECTED
SPECIMEN SOURCE: NORMAL

## 2020-10-16 RX ORDER — INHALER, ASSIST DEVICES
1 SPACER (EA) MISCELLANEOUS ONCE
Status: DISCONTINUED | OUTPATIENT
Start: 2020-10-16 | End: 2020-10-16 | Stop reason: HOSPADM

## 2020-10-16 RX ORDER — ALBUTEROL SULFATE 90 UG/1
2 AEROSOL, METERED RESPIRATORY (INHALATION) EVERY 4 HOURS PRN
Qty: 1 INHALER | Refills: 1 | Status: SHIPPED | OUTPATIENT
Start: 2020-10-16 | End: 2021-09-24

## 2020-10-16 RX ORDER — PREDNISONE 20 MG/1
60 TABLET ORAL DAILY
Qty: 10 TABLET | Refills: 0 | Status: SHIPPED | OUTPATIENT
Start: 2020-10-16 | End: 2020-10-21

## 2020-10-16 NOTE — ED PROVIDER NOTES
History     Chief Complaint:  Shortness of Breath; Chest Pressure       The history is provided by the patient. A  was used (Puerto Rican).      Kayla Howell is a 35-year-old female with a past medical history significant for recent spontaneous vaginal delivery in July 2020 who presents emergency department with chief complaint of intermittent nonradiating nonexertional chest pressure and intermittent shortness of breath for the last approximately 2 weeks.  Patient denies specific alleviating or worsening factors.  She reports that her symptoms are not exacerbated by exertion.  She denies fevers, chills, cough, abdominal pain, nausea, vomiting, changes in urination, changes in bowel movements.  She reports that she has had some lower extremity swelling for the last 2 months since giving birth.     Patient denies tobacco, alcohol, illicit drugs.        Allergies:  No Known Drug Allergies    Medications:    Levothyroxine sodium  Ferrous sulfate  Omeprazole    Past Medical History:    Diabetes  Thyroid disease    Past Surgical History:    History reviewed.  No pertinent past surgical history.    Family History:    History reviewed. No pertinent family history.    Social History:  The patient presents to the ED alone.  Smoking Status: Never Smoker  Smokeless Tobacco: Never Used  Alcohol Use: No  Drug Use: No  PCP: Reba Allen     Review of Systems  Full ROS completed and negative other than pertinent positives and negatives noted in HPI    Physical Exam     Patient Vitals for the past 24 hrs:   BP Temp Temp src Pulse Resp SpO2   10/16/20 0000 100/58 -- -- 69 -- 98 %   10/15/20 2300 113/74 -- -- 81 -- 100 %   10/15/20 2143 130/51 97.8  F (36.6  C) Oral 70 20 95 %       Physical Exam  Constitutional: Well developed, nontox appearance  Head: Atraumatic.   Neck:  no stridor  Eyes: no scleral icterus  Cardiovascular: RRR, 2+ bilat radial pulses  Pulmonary/Chest: nml resp effort, diffuse  wheezing bilaterally  Abdominal: ND, soft, NT, no rebound or guarding   Ext: Warm, well perfused, no pretibial edema  Neurological: A&O, symmetric facies, moves ext x4  Skin: Skin is warm and dry.   Psychiatric: Behavior is normal. Thought content normal.   Nursing note and vitals reviewed.    Emergency Department Course     ECG:  Indication: Dizziness  Time: 2200  Vent. Rate 62 bpm. VT interval 148. QRS duration 78. QT/QTc 412/418. P-R-T axis 64 26 19. Sinus rhythm. Normal ECG.  Read time: 2205      Imaging:  Radiology findings were communicated with the patient who voiced understanding of the findings.    XR Chest Port 1 View:  Negative chest.  As per radiology.     Laboratory:  Laboratory findings were communicated with the patient who voiced understanding of the findings.    CBC: WBC: 11.7 (high), HGB: 12.8, PLT: 289    BMP: Glucose 108 (high), o/w WNL (Creatinine: 0.60)    Troponin (2207): <0.015    D-dimer: 0.3    TSH with Free T4 Reflex: 3.42    HCG Qualitative: Negative    Symptomatic COVID-19 PCR: Pending    Interventions:  2244 Solu-medrol 125mg IV  2245 DuoNeb 3mL Nebulizer  2246 DuoNeb 3mL Nebulizer     Emergency Department Course:  Past medical records, nursing notes, and vitals reviewed.    2202 I performed an exam of the patient as documented above.     EKG obtained in the ED, see results above.     IV was inserted and blood was drawn for laboratory testing, results above.    Portable chest x-ray was used at the patient's bedside, see results above.      0020 I rechecked the patient and discussed the results of her workup thus far. At this point I feel that the patient is safe for discharge, and the patient agrees.     Findings and plan explained to the patient. Patient discharged home with instructions regarding supportive care, medications, and reasons to return. The importance of close follow-up was reviewed. The patient was prescribed albuterol inhaler and prednisone.     I personally reviewed the  laboratory and imaging results with the patient and answered all related questions prior to discharge.     Impression & Plan     Medical Decision Making:  Covid-19  Kayla Howell was evaluated during a global COVID-19 pandemic, which necessitated consideration that the patient might be at risk for infection with the SARS-CoV-2 virus that causes COVID-19.   Applicable protocols for evaluation were followed during the patient's care.        35 year old female presenting w/ intermittent chest pressure and dyspnea     DDx includes viral syndrome NOS, influenza-like illness, influenza, COVID-19, asthma exacerbation, PE although less likely given notable wheezing on exam.  Doubt CHF, dissection, pneumothorax given history, physical exam and duration of symptoms.  Labs significant for minimal leukocytosis, nml D dimer, undetectable trop, covid 19 swab pending.  Imaging sig for no acute cardiopulmonary disease.  Interventions as noted above with improvement in symptoms.  Patient presentation consistent with asthma exacerbation.  Doubt significant hypercarbia.  COVID-19 remains in the differential with swab pending.  They are given instructions regarding self-isolation per CDC and [facility] guidelines and provided written instructions as well.  Prescriptions written as noted below for outpatient management.  At this time I feel the pt is safe for discharge.  Recommendations given regarding follow up with PCP and return to the emergency department as needed for new or worsening symptoms.  Pt counseled on all results, disposition and diagnosis.  They are understanding and agreeable to plan. Patient discharged in stable condition.       Diagnosis:    ICD-10-CM    1. Exacerbation of asthma, unspecified asthma severity, unspecified whether persistent  J45.901        Disposition:  Discharged to home.    Discharge Medications:  New Prescriptions    ALBUTEROL (PROAIR HFA/PROVENTIL HFA/VENTOLIN HFA) 108  (90 BASE) MCG/ACT INHALER    Inhale 2 puffs into the lungs every 4 hours as needed for shortness of breath / dyspnea or wheezing    PREDNISONE (DELTASONE) 20 MG TABLET    Take 3 tablets (60 mg) by mouth daily for 5 days       Scribe Disclosure:  I, Diogo [place], am serving as a scribe at 9:59 PM on 10/15/2020 to document services personally performed by Alex Urias MD based on my observations and the provider's statements to me.      Alex Urias MD  10/16/20 0301       Alex Urias MD  10/16/20 0301

## 2020-10-16 NOTE — ED TRIAGE NOTES
Here for dizziness, right sided chest pain, sob, bilateral lower leg pain, body aches x2 months. Recently had vaginal delivery on 7/18/2020.  for Iranian use in triage. ABCs intact.

## 2020-10-16 NOTE — DISCHARGE INSTRUCTIONS
Please return to the emergency department as needed for new or worsening symptoms including severe and uncontrollable pain, fainting, severe and worsening shortness of breath, vomiting and unable to keep any down, any other concerning symptoms.        Discharge Instructions  Asthma    Asthma is a condition causing narrowing and inflammation of the airways that can make it hard to breathe.  Asthma can also cause cough, wheezing, noisy breathing and tightness in the chest.  Asthma can be brought on or  triggered  by many things, including dust, mold, pollen, cigarette smoke, exercise, stress and infections (like the common cold).     Generally, every Emergency Department visit should have a follow-up clinic visit with either a primary or a specialty clinic/provider. Please follow-up as instructed by your emergency provider today.    Return to the Emergency Department if:  Your breathing gets worse.  You need to use your inhaler more often than every 4 hours, or cannot get relief from your inhaler.  You are very weak, or feel much more ill.  You develop new symptoms, such as chest pain.  You cough up blood.  You are vomiting (throwing up) enough that you cannot keep fluids or your medicine down.    What can I do to help myself?  Fill any prescriptions the provider gave you and take them right away--especially antibiotics. Be sure to finish the whole antibiotic prescription.  You may be given a prescription for an inhaler, which can help loosen tight air passages.  Use this as needed, but not more often than directed. Inhalers work much better when used with a spacer.   You may be given a prescription for a steroid to reduce inflammation. Used long-term, these can have some side effects, but for short-term use they are safe. You may notice restlessness or increased appetite (eating more).      You may use non-prescription cough or cold medicines. Cough medicines may help, but do not make the cough go away completely.    Avoid smoke, because this can make you feel worse. If you smoke, this may be a good time to quit! Consider using nicotine lozenges, gum, or patches to reduce cravings.   If you have a fever, Tylenol  (acetaminophen), Motrin  (ibuprofen), or Advil  (ibuprofen), may help bring fever down and may help you feel more comfortable. Be sure to read and follow the package directions, and ask your provider if you have questions.  Be sure to get your flu shot each year.  For certain age groups, the pneumonia shot can help prevent pneumonia.  It is important that you follow up with your regular provider, to be sure that you are improving from this spell (an acute asthma exacerbation), and also to do what you can to keep from having trouble again. Sometimes you need long-term medicines to keep your asthma under control.   If you were given a prescription for medicine here today, be sure to read all of the information (including the package insert) that comes with your prescription.  This will include important information about the medicine, its side effects, and any warnings that you need to know about.  The pharmacist who fills the prescription can provide more information and answer questions you may have about the medicine.  If you have questions or concerns that the pharmacist cannot address, please call or return to the Emergency Department.   Remember that you can always come back to the Emergency Department if you are not able to see your regular provider in the amount of time listed above, if you get any new symptoms, or if there is anything that worries you.

## 2020-10-20 ENCOUNTER — ANCILLARY PROCEDURE (OUTPATIENT)
Dept: ULTRASOUND IMAGING | Facility: CLINIC | Age: 35
End: 2020-10-20
Attending: OBSTETRICS & GYNECOLOGY
Payer: COMMERCIAL

## 2020-10-20 DIAGNOSIS — R10.2 PELVIC PAIN IN FEMALE: ICD-10-CM

## 2020-10-20 DIAGNOSIS — Z11.3 SCREEN FOR STD (SEXUALLY TRANSMITTED DISEASE): ICD-10-CM

## 2020-10-20 LAB
HBV SURFACE AG SERPL QL IA: NONREACTIVE
HIV 1+2 AB+HIV1 P24 AG SERPL QL IA: NONREACTIVE

## 2020-10-20 PROCEDURE — 87389 HIV-1 AG W/HIV-1&-2 AB AG IA: CPT | Performed by: OBSTETRICS & GYNECOLOGY

## 2020-10-20 PROCEDURE — 76830 TRANSVAGINAL US NON-OB: CPT

## 2020-10-20 PROCEDURE — 76856 US EXAM PELVIC COMPLETE: CPT

## 2020-10-20 PROCEDURE — 86780 TREPONEMA PALLIDUM: CPT | Mod: 90 | Performed by: OBSTETRICS & GYNECOLOGY

## 2020-10-20 PROCEDURE — 99000 SPECIMEN HANDLING OFFICE-LAB: CPT | Performed by: OBSTETRICS & GYNECOLOGY

## 2020-10-20 PROCEDURE — 87340 HEPATITIS B SURFACE AG IA: CPT | Performed by: OBSTETRICS & GYNECOLOGY

## 2020-10-21 ENCOUNTER — APPOINTMENT (OUTPATIENT)
Dept: INTERPRETER SERVICES | Facility: CLINIC | Age: 35
End: 2020-10-21
Payer: COMMERCIAL

## 2020-10-21 LAB — T PALLIDUM AB SER QL: NONREACTIVE

## 2020-12-27 ENCOUNTER — OFFICE VISIT (OUTPATIENT)
Dept: URGENT CARE | Facility: URGENT CARE | Age: 35
End: 2020-12-27
Payer: COMMERCIAL

## 2020-12-27 VITALS
DIASTOLIC BLOOD PRESSURE: 78 MMHG | OXYGEN SATURATION: 98 % | HEART RATE: 78 BPM | TEMPERATURE: 98.2 F | RESPIRATION RATE: 20 BRPM | SYSTOLIC BLOOD PRESSURE: 130 MMHG

## 2020-12-27 DIAGNOSIS — R63.1 INCREASED THIRST: ICD-10-CM

## 2020-12-27 DIAGNOSIS — S01.512A LACERATION OF BUCCAL MUCOSA, INITIAL ENCOUNTER: Primary | ICD-10-CM

## 2020-12-27 LAB — HBA1C MFR BLD: 5.8 % (ref 0–5.6)

## 2020-12-27 PROCEDURE — 36415 COLL VENOUS BLD VENIPUNCTURE: CPT | Performed by: FAMILY MEDICINE

## 2020-12-27 PROCEDURE — 83036 HEMOGLOBIN GLYCOSYLATED A1C: CPT | Performed by: FAMILY MEDICINE

## 2020-12-27 PROCEDURE — 99213 OFFICE O/P EST LOW 20 MIN: CPT | Performed by: FAMILY MEDICINE

## 2020-12-27 NOTE — PATIENT INSTRUCTIONS
"You cheek pain appears to be from a cut on the inside of your cheek, likely from your braces. It just needs more time to heal. It does not look infected, and appears to be healing well so far. You can use the numbing gel 3 times a day for a couple of days to help. Do not breastfeed while using.     Your diabetes test shows \"prediabetes\" which means an increased risk of developing diabetes in the future.  "

## 2020-12-27 NOTE — PROGRESS NOTES
ASSESSMENT & PLAN:  1. Small, healing laceration right buccal mucosa  Healing well, no signs of infection. Closed. She asks for something for the pain and swelling. I have provided an rx for oragel, but advised against breast feeding while using. I advised patient that it is likely not going to help much, so I do not think it is worth it if it means she will decrease breastfeeding. She states she can pump and dump milk to keep her supply up. Follow up prn.    2. Prediabetes  Discussed A1c results.     There are no Patient Instructions on file for this visit.    No orders of the defined types were placed in this encounter.    There are no discontinued medications.    No follow-ups on file.    CHIEF COMPLAINT:  Cheek pain and swelling    HISTORY OF PRESENT ILLNESS:  Kayal is a 35 year old female interviewed through phone  presenting to urgent care today   4 teeth removed about a week ago to make space (one on top and bottom left and right), areas were swollen and painful and could not chew due to pain. Only able to eat liquids now such as soup. No fever. No facial pain or swelling, but feels like most of the pain and swelling are on the inside of the right cheek. She has braces.     Wants to check for diabetes. Suspects she has it. Had it in pregnancy. Feels like she is thirsty a lot, denies urinating more recently. Denies fatigue. Last ate about 3 hours ago.    Denies pregnancy. Currently breast feeding 5 mo old a few times a day (also uses formula).       REVIEW OF SYSTEMS:  As noted in HPI, otherwise negative.    PFSH:  Patient Active Problem List   Diagnosis     Normal labor and delivery     Overweight     Obesity     Encounter for triage in pregnant patient     Past Medical History:   Diagnosis Date     Diabetes (H)      Thyroid disease      History reviewed. No pertinent surgical history.    TOBACCO USE:  History   Smoking Status     Never Smoker   Smokeless Tobacco     Never Used        VITALS:  Vitals:    12/27/20 1652   BP: 130/78   Pulse: 78   Resp: 20   Temp: 98.2  F (36.8  C)   TempSrc: Tympanic   SpO2: 98%     Wt Readings from Last 3 Encounters:   02/24/20 92.1 kg (203 lb 0.7 oz)   11/26/19 81.6 kg (180 lb)   11/07/19 82.3 kg (181 lb 8 oz)     There is no height or weight on file to calculate BMI.    PHYSICAL EXAM:  GENERAL: Pleasant, well-appearing, in no acute distress  HEENT: Pupils equal, round. Sclerae and conjunctivae clear. Oropharynx is clear with moist mucous membranes. Gums where the 4 teeth were removed all appear normal. No swelling, erythema, or drainage. On buccal mucosa of right cheek near the mouth, she has a healing linear laceration, approx 1cm in length. No drainage, no surrounding erythema or significant swelling.   NECK: Supple without lymphadenopathy  NEURO: Alert and oriented. Grossly nonfocal.  PSYCHIATRIC: Presents on time and well groomed. Normal speech and thought content. Full affect. No abnormal movement or behaviors noted.

## 2021-01-03 ENCOUNTER — HOSPITAL ENCOUNTER (EMERGENCY)
Facility: CLINIC | Age: 36
Discharge: HOME | End: 2021-01-03
Attending: PHYSICIAN ASSISTANT | Admitting: PHYSICIAN ASSISTANT
Payer: COMMERCIAL

## 2021-01-03 VITALS
RESPIRATION RATE: 18 BRPM | SYSTOLIC BLOOD PRESSURE: 127 MMHG | OXYGEN SATURATION: 99 % | DIASTOLIC BLOOD PRESSURE: 80 MMHG | HEART RATE: 68 BPM | TEMPERATURE: 97.8 F

## 2021-01-03 DIAGNOSIS — R42 DIZZINESS: ICD-10-CM

## 2021-01-03 LAB
ALBUMIN SERPL-MCNC: 3.6 G/DL (ref 3.4–5)
ALBUMIN UR-MCNC: NEGATIVE MG/DL
ALP SERPL-CCNC: 87 U/L (ref 40–150)
ALT SERPL W P-5'-P-CCNC: 25 U/L (ref 0–50)
ANION GAP SERPL CALCULATED.3IONS-SCNC: 1 MMOL/L (ref 3–14)
APPEARANCE UR: CLEAR
AST SERPL W P-5'-P-CCNC: 20 U/L (ref 0–45)
B-HCG FREE SERPL-ACNC: <5 IU/L
BACTERIA #/AREA URNS HPF: ABNORMAL /HPF
BASOPHILS # BLD AUTO: 0 10E9/L (ref 0–0.2)
BASOPHILS NFR BLD AUTO: 0.3 %
BILIRUB SERPL-MCNC: 0.2 MG/DL (ref 0.2–1.3)
BILIRUB UR QL STRIP: NEGATIVE
BUN SERPL-MCNC: 11 MG/DL (ref 7–30)
CALCIUM SERPL-MCNC: 8.7 MG/DL (ref 8.5–10.1)
CHLORIDE SERPL-SCNC: 109 MMOL/L (ref 94–109)
CO2 SERPL-SCNC: 30 MMOL/L (ref 20–32)
COLOR UR AUTO: ABNORMAL
CREAT SERPL-MCNC: 0.67 MG/DL (ref 0.52–1.04)
DIFFERENTIAL METHOD BLD: NORMAL
EOSINOPHIL # BLD AUTO: 0.6 10E9/L (ref 0–0.7)
EOSINOPHIL NFR BLD AUTO: 5.9 %
ERYTHROCYTE [DISTWIDTH] IN BLOOD BY AUTOMATED COUNT: 13.2 % (ref 10–15)
FLUABV+SARS-COV-2+RSV PNL RESP NAA+PROBE: NEGATIVE
FLUABV+SARS-COV-2+RSV PNL RESP NAA+PROBE: NEGATIVE
GFR SERPL CREATININE-BSD FRML MDRD: >90 ML/MIN/{1.73_M2}
GLUCOSE SERPL-MCNC: 102 MG/DL (ref 70–99)
GLUCOSE UR STRIP-MCNC: NEGATIVE MG/DL
HCT VFR BLD AUTO: 42.4 % (ref 35–47)
HGB BLD-MCNC: 13.5 G/DL (ref 11.7–15.7)
HGB UR QL STRIP: NEGATIVE
IMM GRANULOCYTES # BLD: 0 10E9/L (ref 0–0.4)
IMM GRANULOCYTES NFR BLD: 0.3 %
KETONES UR STRIP-MCNC: NEGATIVE MG/DL
LABORATORY COMMENT REPORT: NORMAL
LEUKOCYTE ESTERASE UR QL STRIP: ABNORMAL
LYMPHOCYTES # BLD AUTO: 3.7 10E9/L (ref 0.8–5.3)
LYMPHOCYTES NFR BLD AUTO: 35.5 %
MCH RBC QN AUTO: 28.3 PG (ref 26.5–33)
MCHC RBC AUTO-ENTMCNC: 31.8 G/DL (ref 31.5–36.5)
MCV RBC AUTO: 89 FL (ref 78–100)
MONOCYTES # BLD AUTO: 0.5 10E9/L (ref 0–1.3)
MONOCYTES NFR BLD AUTO: 4.8 %
NEUTROPHILS # BLD AUTO: 5.5 10E9/L (ref 1.6–8.3)
NEUTROPHILS NFR BLD AUTO: 53.2 %
NITRATE UR QL: NEGATIVE
NRBC # BLD AUTO: 0 10*3/UL
NRBC BLD AUTO-RTO: 0 /100
PH UR STRIP: 6.5 PH (ref 5–7)
PLATELET # BLD AUTO: 276 10E9/L (ref 150–450)
POTASSIUM SERPL-SCNC: 3.8 MMOL/L (ref 3.4–5.3)
PROT SERPL-MCNC: 7.8 G/DL (ref 6.8–8.8)
RBC # BLD AUTO: 4.77 10E12/L (ref 3.8–5.2)
RBC #/AREA URNS AUTO: 1 /HPF (ref 0–2)
RSV RNA SPEC QL NAA+PROBE: NORMAL
SARS-COV-2 RNA SPEC QL NAA+PROBE: NEGATIVE
SODIUM SERPL-SCNC: 140 MMOL/L (ref 133–144)
SOURCE: ABNORMAL
SP GR UR STRIP: 1 (ref 1–1.03)
SPECIMEN SOURCE: NORMAL
SQUAMOUS #/AREA URNS AUTO: <1 /HPF (ref 0–1)
TSH SERPL DL<=0.005 MIU/L-ACNC: 2.7 MU/L (ref 0.4–4)
UROBILINOGEN UR STRIP-MCNC: NORMAL MG/DL (ref 0–2)
WBC # BLD AUTO: 10.4 10E9/L (ref 4–11)
WBC #/AREA URNS AUTO: 1 /HPF (ref 0–5)

## 2021-01-03 PROCEDURE — 93005 ELECTROCARDIOGRAM TRACING: CPT

## 2021-01-03 PROCEDURE — 84443 ASSAY THYROID STIM HORMONE: CPT | Performed by: PHYSICIAN ASSISTANT

## 2021-01-03 PROCEDURE — C9803 HOPD COVID-19 SPEC COLLECT: HCPCS

## 2021-01-03 PROCEDURE — 258N000003 HC RX IP 258 OP 636: Performed by: PHYSICIAN ASSISTANT

## 2021-01-03 PROCEDURE — 87636 SARSCOV2 & INF A&B AMP PRB: CPT | Performed by: PHYSICIAN ASSISTANT

## 2021-01-03 PROCEDURE — 96360 HYDRATION IV INFUSION INIT: CPT

## 2021-01-03 PROCEDURE — 80053 COMPREHEN METABOLIC PANEL: CPT | Performed by: PHYSICIAN ASSISTANT

## 2021-01-03 PROCEDURE — 84702 CHORIONIC GONADOTROPIN TEST: CPT

## 2021-01-03 PROCEDURE — 81001 URINALYSIS AUTO W/SCOPE: CPT | Performed by: PHYSICIAN ASSISTANT

## 2021-01-03 PROCEDURE — 85025 COMPLETE CBC W/AUTO DIFF WBC: CPT | Performed by: PHYSICIAN ASSISTANT

## 2021-01-03 PROCEDURE — 250N000013 HC RX MED GY IP 250 OP 250 PS 637: Performed by: PHYSICIAN ASSISTANT

## 2021-01-03 PROCEDURE — 99284 EMERGENCY DEPT VISIT MOD MDM: CPT | Mod: 25

## 2021-01-03 PROCEDURE — 96361 HYDRATE IV INFUSION ADD-ON: CPT

## 2021-01-03 RX ORDER — ONDANSETRON 4 MG/1
4 TABLET, ORALLY DISINTEGRATING ORAL EVERY 8 HOURS PRN
Qty: 10 TABLET | Refills: 0 | Status: SHIPPED | OUTPATIENT
Start: 2021-01-03

## 2021-01-03 RX ORDER — MECLIZINE HYDROCHLORIDE 25 MG/1
25 TABLET ORAL ONCE
Status: COMPLETED | OUTPATIENT
Start: 2021-01-03 | End: 2021-01-03

## 2021-01-03 RX ORDER — MECLIZINE HYDROCHLORIDE 25 MG/1
25 TABLET ORAL EVERY 6 HOURS PRN
Qty: 30 TABLET | Refills: 0 | Status: SHIPPED | OUTPATIENT
Start: 2021-01-03

## 2021-01-03 RX ADMIN — MECLIZINE HYDROCHLORIDE 25 MG: 25 TABLET ORAL at 17:25

## 2021-01-03 RX ADMIN — SODIUM CHLORIDE 1000 ML: 9 INJECTION, SOLUTION INTRAVENOUS at 17:26

## 2021-01-03 NOTE — ED TRIAGE NOTES
Patient presents with complaints of dizziness for the past three days which gets worse with movement. Denies any chest pain or SOB. ABC intact without need for intervention at this time.

## 2021-01-03 NOTE — ED AVS SNAPSHOT
[facility] Emergency Dept  [address]  [facility] [address]  Phone: [phone]  Fax: [phone]                                    Kayla Howell   MRN: [id number]    Department: [facility] Emergency Dept   Date of Visit: 1/3/2021           After Visit Summary Signature Page    I have received my discharge instructions, and my questions have been answered. I have discussed any challenges I see with this plan with the nurse or doctor.    ..........................................................................................................................................  Patient/Patient Representative Signature      ..........................................................................................................................................  Patient Representative Print Name and Relationship to Patient    ..................................................               ................................................  Date                                   Time    ..........................................................................................................................................  Reviewed by Signature/Title    ...................................................              ..............................................  Date                                               Time          22EPIC Rev 08/18

## 2021-01-04 LAB — INTERPRETATION ECG - MUSE: NORMAL

## 2021-01-04 NOTE — ED PROVIDER NOTES
History     Chief Complaint:  Dizziness      HPI   Obtained via iPad .     Kayla Howell is a 36 year old female who presents for evaluation of vertigo. The patient has been feeling dizzy with movements for the past 3 days. She has a very mild headache.  She is concerned that she is pregnant, as it has been 2 months since her last menstrual period. She denies any vomiting. She states that the dizziness gets better when she sits in a position for a long period of time. No recent trauma, falls, chiropractic manipulation. No vision issues, fevers, chills, shortness of breath, chest pain, body aches, or urinary symptoms.     Allergies:  No known drug allergies    Medications:    Albuterol  Benzocaine  Ferrous sulfate  Levothyroxine   Omeprazole  Prenatal    Past Medical History:    Diabetes  Thyroid disease    Past Surgical History:    No pertinent past surgical history.     Family History:    No pertinent family history    Social History:  Marital Status:     No smoking history      Review of Systems  Ten review of systems negative, apart from what is noted above in HPI.       Physical Exam   Vitals:  Patient Vitals for the past 24 hrs:   BP Temp Temp src Pulse Resp SpO2   01/03/21 1845 127/80 -- -- -- -- 99 %   01/03/21 1830 113/66 -- -- 68 -- 98 %   01/03/21 1815 -- -- -- -- -- 98 %   01/03/21 1730 -- -- -- 68 -- 99 %   01/03/21 1700 -- -- -- -- -- 100 %   01/03/21 1629 138/59 97.8  F (36.6  C) Oral 76 18 100 %     Physical Exam  General: Alert and interactive. Appears well. Cooperative and pleasant.   Eyes: The pupils are equal and round. EOMs intact. No scleral icterus.  ENT: No abnormalities to the external nose or ears. Mucous membranes moist. Posterior oropharynx is non-erythematous.    Neck: Trachea is in the midline. No nuchal rigidity.     CV: Regular rate and rhythm. S1 and S2 normal without murmur, click, gallop or rub.   Resp: Breath sounds are clear bilaterally, without rhonchi,  wheezes, rales. Non-labored, no retractions or accessory muscle use.     GI: Abdomen is soft without distension. No tenderness to palpation. No peritoneal signs.    MS: Moving all extremities well. Good muscle tone.   Skin: Warm and dry. No rash or lesions noted.  Neuro:  Speech is normal and fluent.   Face is symmetric without droop. CNs II-XII intact. Negative pronator drift. Finger to nose intact. Heel to shin intact.   Right Arm: Good  strength. 5/5 elbow flexion. 5/5 elbow extension. Sensation intact to light touch.   Left Arm: Good  strength. 5/5 elbow flexion. 5/5 elbow extension. Sensation intact to light touch.   Right Le/5 straight leg raise, 5/5 knee flexion, 5/5 knee extension, 5/5 dorsiflexion, 5/5 plantar flexion. Sensation intact to light touch.   Left Le/5 straight leg raise, 5/5 knee flexion, 5/5 knee extension, 5/5 dorsiflexion, 5/5 plantar flexion. Sensation intact to light touch.            Gait: Normal without antalgia.   Psych: Awake. Alert.  Normal affect. Appropriate interactions.  Lymph: No anterior or posterior cervical lymphadenopathy noted.    Emergency Department Course   ECG:  Indication: Dizziness  Time: 1639  Vent. Rate 64 bpm. MN interval 156. QRS duration 78. QT/QTc 402/414. P-R-T axis 65 23 23.  Sinus rhythm. Normal ECG. No significant change compared to ECG dated 10/15/2020. Read time: 1639     Laboratory:  CBC: WBC: 10.4, HGB: 13.5, PLT: 276  CMP: Glucose 102(H), Anion Gap: 1(L), o/w WNL (Creatinine: 0.67)     TSH with free T4 reflex: 2.70  HCG Quantitative Pregnancy: <5.0    UA with Microscopic: Leukocyte Esterase: trace, Bacteria: few, o/w WNL     Symptomatic Influenza A/B & SARS-CoV2 (COVID-19) Virus PCR Multiplex: Negative    Procedures:  None        Interventions:  1725 Meclizine, 25 mg, PO  1726 0.9% sodium chloride bolus, 1,000 ml, IV     Emergency Department Course:  Past medical records, nursing notes, and vitals reviewed.   I performed an exam of the  patient as documented above.   The above labs were obtained. Results above.  I rechecked patient. Patient passed ambulation trial.   I discussed the treatment plan with the patient. She expressed understanding of this plan and consented to discharge. Patient will be discharged home with instructions for care and follow up. In addition, the patient will return to the emergency department if symptoms persist, worsen, if new symptoms arise or if there is any concern.  All questions were answered.    Impression & Plan      Covid-19  Kayla Howell was evaluated during a global COVID-19 pandemic, which necessitated consideration that the patient might be at risk for infection with the SARS-CoV-2 virus that causes COVID-19.   Applicable protocols for evaluation were followed during the patient's care.   COVID-19 was considered as part of the patient's evaluation. The plan for testing is:  a test was obtained during this visit.    Medical Decision Making:  Kayla Howell is a 36 year old female who presents with vertigo that is positional in nature. EKG shows NSR without ischemia or arrhythmia. Labs show no anemia or electrolyte deficiencies. Patient is not pregnant. No UTI or other infectious etiology. No chest pain or shortness of breath to suggest cardiopulmonary etiology like ACS or PE. No recent trauma to suggest subdural hemorrhage. No recent chiropractic manipulation to suggest dissection. No worst ever headache quality to suggest SAH. No neurologic deficits and with positional nature of symptoms, I suspect this is BPPV and not a posterior circulation CVA. COVID and influenza screening is negative. Patient amble to ambulate and eat/drink here and I feel she is safe for discharge home. Will take Zofran and Meclizine and follow up with PCP should symptoms persist.     Diagnosis:    ICD-10-CM    1. Dizziness  R42        Disposition:  discharged to home    Discharge Medications:  Discharge Medication List as  of 1/3/2021  7:06 PM      START taking these medications    Details   meclizine (ANTIVERT) 25 MG tablet Take 1 tablet (25 mg) by mouth every 6 hours as needed for dizziness, Disp-30 tablet, R-0, Local Print      ondansetron (ZOFRAN ODT) 4 MG ODT tab Take 1 tablet (4 mg) by mouth every 8 hours as needed for nausea, Disp-10 tablet, R-0, Local Print             Elizabeth Meng PA-C  1/3/2021   [facility] EMERGENCY DEPT       Elizabeth Meng PA-C  01/03/21 2036

## 2021-09-24 ENCOUNTER — OFFICE VISIT (OUTPATIENT)
Dept: URGENT CARE | Facility: URGENT CARE | Age: 36
End: 2021-09-24
Payer: COMMERCIAL

## 2021-09-24 VITALS
DIASTOLIC BLOOD PRESSURE: 64 MMHG | SYSTOLIC BLOOD PRESSURE: 100 MMHG | OXYGEN SATURATION: 100 % | TEMPERATURE: 97.7 F | HEART RATE: 64 BPM

## 2021-09-24 DIAGNOSIS — R51.9 NONINTRACTABLE EPISODIC HEADACHE, UNSPECIFIED HEADACHE TYPE: ICD-10-CM

## 2021-09-24 DIAGNOSIS — H69.93 DYSFUNCTION OF BOTH EUSTACHIAN TUBES: ICD-10-CM

## 2021-09-24 DIAGNOSIS — R05.9 COUGH: ICD-10-CM

## 2021-09-24 DIAGNOSIS — J45.21 MILD INTERMITTENT ASTHMA WITH ACUTE EXACERBATION: Primary | ICD-10-CM

## 2021-09-24 DIAGNOSIS — R00.2 PALPITATIONS: ICD-10-CM

## 2021-09-24 DIAGNOSIS — H65.93 BILATERAL SEROUS OTITIS MEDIA, UNSPECIFIED CHRONICITY: ICD-10-CM

## 2021-09-24 PROCEDURE — U0005 INFEC AGEN DETEC AMPLI PROBE: HCPCS | Performed by: FAMILY MEDICINE

## 2021-09-24 PROCEDURE — 99215 OFFICE O/P EST HI 40 MIN: CPT | Performed by: FAMILY MEDICINE

## 2021-09-24 PROCEDURE — U0003 INFECTIOUS AGENT DETECTION BY NUCLEIC ACID (DNA OR RNA); SEVERE ACUTE RESPIRATORY SYNDROME CORONAVIRUS 2 (SARS-COV-2) (CORONAVIRUS DISEASE [COVID-19]), AMPLIFIED PROBE TECHNIQUE, MAKING USE OF HIGH THROUGHPUT TECHNOLOGIES AS DESCRIBED BY CMS-2020-01-R: HCPCS | Performed by: FAMILY MEDICINE

## 2021-09-24 RX ORDER — DOXYCYCLINE HYCLATE 100 MG
100 TABLET ORAL 2 TIMES DAILY
Qty: 20 TABLET | Refills: 0 | Status: SHIPPED | OUTPATIENT
Start: 2021-09-24 | End: 2021-09-24

## 2021-09-24 RX ORDER — ALBUTEROL SULFATE 90 UG/1
2 AEROSOL, METERED RESPIRATORY (INHALATION) EVERY 4 HOURS PRN
Qty: 18 G | Refills: 0 | Status: SHIPPED | OUTPATIENT
Start: 2021-09-24 | End: 2021-09-24

## 2021-09-24 RX ORDER — FLUTICASONE PROPIONATE 50 MCG
1 SPRAY, SUSPENSION (ML) NASAL 2 TIMES DAILY
Qty: 16 G | Refills: 0 | Status: SHIPPED | OUTPATIENT
Start: 2021-09-24 | End: 2021-09-24

## 2021-09-24 RX ORDER — FLUTICASONE PROPIONATE 50 MCG
1 SPRAY, SUSPENSION (ML) NASAL 2 TIMES DAILY
Qty: 16 G | Refills: 0 | Status: SHIPPED | OUTPATIENT
Start: 2021-09-24

## 2021-09-24 RX ORDER — DOXYCYCLINE HYCLATE 100 MG
100 TABLET ORAL 2 TIMES DAILY
Qty: 20 TABLET | Refills: 0 | Status: SHIPPED | OUTPATIENT
Start: 2021-09-24

## 2021-09-24 RX ORDER — METHYLPREDNISOLONE 4 MG
TABLET, DOSE PACK ORAL
Qty: 21 TABLET | Refills: 0 | Status: SHIPPED | OUTPATIENT
Start: 2021-09-24

## 2021-09-24 RX ORDER — METHYLPREDNISOLONE 4 MG
TABLET, DOSE PACK ORAL
Qty: 21 TABLET | Refills: 0 | Status: SHIPPED | OUTPATIENT
Start: 2021-09-24 | End: 2021-09-24

## 2021-09-24 RX ORDER — ALBUTEROL SULFATE 90 UG/1
2 AEROSOL, METERED RESPIRATORY (INHALATION) EVERY 4 HOURS PRN
Qty: 18 G | Refills: 0 | Status: SHIPPED | OUTPATIENT
Start: 2021-09-24 | End: 2024-03-09

## 2021-09-24 NOTE — PROGRESS NOTES
AI good  Patient presents with:  Urgent Care: HA and diziness dry mouth and dry throat       Subjective     Kayla Howell is a 36 year old female who presents to clinic today for the following health issues:    HPI     Dizziness      Duration: 3 weeks ago     Description     Headache mainly in morning, ok during the day and then back in afternoon, goes to bed with headache    No history of this in past   Feeling faint:  no , but on occasion feels heart rate increases -similar when pregnant coming back now, had menses 2 weeks ago, not on birth control   Feeling like the surroundings are moving: YES  Loss of consciousness or falls: no     Intensity:  moderate    Accompanying signs and symptoms:   Nausea/vomitting: no   Palpitations: YES  Weakness in arms or legs: no   Vision or speech changes: no   Ringing in ears (Tinnitus): no   Ear pain/throat -/dryness in throat and mouth-  Hearing loss related to dizziness: no   Other (fevers/chills/sweating/dyspnea): no , has a cough    History (similar episodes/head trauma/previous evaluation/recent bleeding): None    Precipitating or alleviating factors (new meds/chemicals): None  Worse with activity/head movement: YES    Therapies tried and outcome: tylenol-sometimes helps other times it does not         Past Medical History:   Diagnosis Date     Diabetes (H)      Thyroid disease      Social History     Tobacco Use     Smoking status: Never Smoker     Smokeless tobacco: Never Used   Substance Use Topics     Alcohol use: No       Current Outpatient Medications   Medication Sig Dispense Refill     albuterol (PROAIR HFA/PROVENTIL HFA/VENTOLIN HFA) 108 (90 Base) MCG/ACT inhaler Inhale 2 puffs into the lungs every 4 hours as needed for shortness of breath / dyspnea or wheezing 18 g 0     benzocaine (ANBESOL) 10 % gel Apply a small amount to inside of cheek over painful area up to three times daily 9 g 0     doxycycline hyclate (VIBRA-TABS) 100 MG tablet Take 1 tablet (100  mg) by mouth 2 times daily 20 tablet 0     ferrous sulfate (SLO-FE) 142 (45 FE) MG TBCR Take 1 tablet (142 mg) by mouth daily 30 tablet prn     fluticasone (FLONASE) 50 MCG/ACT nasal spray Spray 1 spray into both nostrils 2 times daily 16 g 0     LEVOTHYROXINE SODIUM PO Take by mouth daily       meclizine (ANTIVERT) 25 MG tablet Take 1 tablet (25 mg) by mouth every 6 hours as needed for dizziness 30 tablet 0     methylPREDNISolone (MEDROL DOSEPAK) 4 MG tablet therapy pack Follow Package Directions 21 tablet 0     OMEPRAZOLE PO        ondansetron (ZOFRAN ODT) 4 MG ODT tab Take 1 tablet (4 mg) by mouth every 8 hours as needed for nausea 10 tablet 0     PRENATAL VITAMINS PO Take 1 tablet by mouth daily.       No Known Allergies          ROS are negative, except as otherwise noted HPI      Objective    /64   Pulse 64   Temp 97.7  F (36.5  C)   SpO2 100%   There is no height or weight on file to calculate BMI.  Physical Exam   GENERAL:alert and mild  distress  EYES: Eyes grossly normal to inspection, PERRL and conjunctivae and sclerae normal  HENT: ear canals clear and TM's dull bilateral with air-fluid levels l, nose-thick nasal discharge and mouth without ulcers or lesions  NECK: Mild shoddy anterior chain bilateral adenopathy, no asymmetry, masses, or scars and thyroid normal to palpation  RESP: lungs diffuse wheezing throughout  CV: regular rate and rhythm, normal S1 S2, no S3 or S4, no murmur, click or rub,  ABDOMEN: soft, nontender, no hepatosplenomegaly, no masses and bowel sounds normal  MS: no gross musculoskeletal defects noted, no edema  MS: neck exam shows normal strength, ROM is normal and mild paraspinous muscle tenderness, no radicular symptoms with ROM  NEURO: Normal strength and tone, mentation intact and speech normal mentation intact, cranial nerves 2-12 intact, DTR's normal and symmetric +2 at the knees, gait normal including heel/toe/tandem walking, Romberg normal and rapid alternating  movements normal    Diagnostic Test Results:  Labs reviewed in Epic  Covid testing pending         ASSESSMENT/PLAN:      ICD-10-CM    1. Mild intermittent asthma with acute exacerbation  J45.21 doxycycline hyclate (VIBRA-TABS) 100 MG tablet     albuterol (PROAIR HFA/PROVENTIL HFA/VENTOLIN HFA) 108 (90 Base) MCG/ACT inhaler     DISCONTINUED: albuterol (PROAIR HFA/PROVENTIL HFA/VENTOLIN HFA) 108 (90 Base) MCG/ACT inhaler     DISCONTINUED: doxycycline hyclate (VIBRA-TABS) 100 MG tablet     DISCONTINUED: doxycycline hyclate (VIBRA-TABS) 100 MG tablet     DISCONTINUED: albuterol (PROAIR HFA/PROVENTIL HFA/VENTOLIN HFA) 108 (90 Base) MCG/ACT inhaler   2. Dysfunction of both eustachian tubes  H69.83 fluticasone (FLONASE) 50 MCG/ACT nasal spray     methylPREDNISolone (MEDROL DOSEPAK) 4 MG tablet therapy pack     DISCONTINUED: methylPREDNISolone (MEDROL DOSEPAK) 4 MG tablet therapy pack     DISCONTINUED: fluticasone (FLONASE) 50 MCG/ACT nasal spray     DISCONTINUED: methylPREDNISolone (MEDROL DOSEPAK) 4 MG tablet therapy pack     DISCONTINUED: fluticasone (FLONASE) 50 MCG/ACT nasal spray   3. Bilateral serous otitis media, unspecified chronicity  H65.93 doxycycline hyclate (VIBRA-TABS) 100 MG tablet     DISCONTINUED: doxycycline hyclate (VIBRA-TABS) 100 MG tablet     DISCONTINUED: doxycycline hyclate (VIBRA-TABS) 100 MG tablet   4. Nonintractable episodic headache, unspecified headache type  R51.9    5. Palpitations  R00.2    6. Cough  R05 Symptomatic COVID-19 Virus (Coronavirus) by PCR Nose             Reviewed medication instructions and side effects. Follow up if experiences side effects.     I reviewed supportive care, otc meds to use if needed, expected course, and signs of concern.  Follow up as needed or if she does not improve within  1-2 days or if worsens in any way.  Reviewed red flag symptoms and is to go to the ER if experiences any of these.     The use of Dragon/KSKTation services may have been used to  construct the content in this note; any grammatical or spelling errors are non-intentional. Please contact the author of this note directly if you are in need of any clarification.        On the day of the encounter, time spend on chart review, patient visit, review of testing, documentation and discussion with other providers was 40  minutes        Patient Instructions   Follow up as needed or if your symptoms worsen in any way.     Follow up with your primary care provider or clinic in about 1 week if your symptoms do not improve     Sherry Saucedo MD      Start doxycycline 2 times a day for 10 days always take with food to prevent nausea vomiting even the pharmacist tells you not to take it with food    Start albuterol inhaler for wheezing and cough     Start medrol dose pack in am     Start flonase 1 spray in each nostril in am and bedtime    For the headache take tylenol    If heart starts racing, headache severe, dizziness is worse to ER    Schedule an appointment with primary care at the  to follow up her headache and heart racing      Patient Education     Asthma (Adult)   Asthma is a disease where the medium and small air passages in the lung go into spasm and restrict air flow. Inflammation and swelling of the airways cause further blockage. During an acute asthma attack, these factors cause trouble breathing, wheezing, cough, and chest tightness.     An asthma attack can be triggered by many things. Common triggers include infections such as the common cold, bronchitis, and pneumonia. Irritants such as smoke or pollutants in the air, very cold air, emotional upset, and exercise can also trigger an attack. In many adults with asthma, allergies to dust, mold, pollen, and animal dander can cause an asthma attack. Skipping doses of daily asthma medicine can also bring on an asthma attack.   Asthma can be controlled using the correct medicines prescribed by your healthcare provider and staying away  from known triggers including allergens and irritants.   Home care    Take prescribed medicine exactly at the times advised. If you need medicine such as from a handheld inhaler or aerosol breathing machine more than every 4 hours, contact your healthcare provider or get medical care right away. If you are prescribed an antibiotic or prednisone, take all of the medicine as prescribed. Keep taking it even if you are feeling better after a few days.    Don't smoke. Stay away from the smoke of others.    Some people with asthma find their symptoms get worse when they take aspirin and non-steroidal or fever-reducing medicines such as ibuprofen and naproxen. Talk with your healthcare provider if you think this may apply to you.    Follow-up care  Follow up with your healthcare provider, or as advised. Always bring all of your current medicines to any appointments with your healthcare provider. Also bring a complete list of medicines, even those not taken for asthma. If you don't already have one, talk with your healthcare provider about making your own Asthma Action Plan.   A pneumonia (pneumococcal) vaccine and yearly flu shot (every fall) are advised. Ask your provider about this.   When to get medical advice  Call your healthcare provider or get medical care right away if any of these occur:      More wheezing or shortness of breath    Need to use your inhalers more often than normal without relief    Fever of 100.4 F (38 C) or higher, or as directed by your provider    Coughing up lots of dark-colored or bloody sputum (mucus)    Chest pain with each breath    If you use a peak flow meter as part of an Asthma Action Plan, and you are still in the yellow zone (50% to 80%) 15 minutes after using inhaler medicine.  Call 911  Call 911 if any of these occur:     Trouble walking or talking because you are short of breath    If you use a peak flow meter as part of an Asthma Action Plan, and you are still in the red zone  (less than 50%) 15 minutes after using inhaler medicine    Lips or fingernails turn gray, purple, or blue    Feeling faint or loss of consciousness  Related Content Database (RCDb) last reviewed this educational content on 10/1/2019    4182-3134 [employer]. All rights reserved. This information is not intended as a substitute for professional medical care. Always follow your healthcare professional's instructions.           Patient Education     Bronchitis, Antibiotic Treatment (Adult)    Bronchitis is an infection of the air passages (bronchial tubes) in your lungs. It often occurs when you have a cold. This illness is contagious during the first few days and is spread through the air by coughing and sneezing, or by direct contact (touching the sick person and then touching your own eyes, nose, or mouth).  Symptoms of bronchitis include cough with mucus (phlegm) and low-grade fever. Bronchitis usually lasts 7 to 14 days. Mild cases can be treated with simple home remedies. More severe infection is treated with an antibiotic.  Home care  Follow these guidelines when caring for yourself at home:    If your symptoms are severe, rest at home for the first 2 to 3 days. When you go back to your usual activities, don't let yourself get too tired.    Don't smoke. Also stay away from secondhand smoke.    You may use over-the-counter medicines to control fever or pain, unless another medicine was prescribed. If you have chronic liver or kidney disease or have ever had a stomach ulcer or gastrointestinal bleeding, talk with your healthcare provider before using these medicines. Also talk to your provider if you are taking medicine to prevent blood clots. Aspirin should never be given to anyone younger than 18 who is ill with a viral infection or fever. It may cause severe liver or brain damage.    Your appetite may be low, so a light diet is fine. Stay well hydrated by drinking 6 to 8 glasses of fluids per day. This includes water,  soft drinks, sports drinks, juices, tea, or soup. Extra fluids will help loosen mucus in your nose and lungs.    Over-the-counter cough, cold, and sore-throat medicines will not shorten the length of the illness, but they may be helpful to reduce your symptoms. Don't use decongestants if you have high blood pressure.    Finish all antibiotic medicine. Do this even if you are feeling better after only a few days.  Follow-up care  Follow up with your healthcare provider, or as advised. If you had an X-ray or ECG (electrocardiogram), a specialist will review it. You will be told of any new test results that may affect your care.  If you are age 65 or older, if you smoke, or if you have a chronic lung disease or condition that affects your immune system, ask your healthcare provider about getting a pneumococcal vaccine and a yearly flu shot (influenza vaccine).  When to seek medical advice  Call your healthcare provider right away if any of these occur:    Fever of 100.4 F (38 C) or higher, or as directed by your healthcare provider    Coughing up more sputum    Weakness, drowsiness, headache, facial pain, ear pain, or a stiff neck  Call 911  Call 911 if any of these occur.    Coughing up blood    Weakness, drowsiness, headache, or stiff neck that get worse    Trouble breathing, wheezing, or pain with breathing  [email or web address] last reviewed this educational content on 6/1/2018 2000-2021 [employer]. All rights reserved. This information is not intended as a substitute for professional medical care. Always follow your healthcare professional's instructions.           Patient Education     Viral or Bacterial Bronchitis with Wheezing (Adult)    Bronchitis is an infection of the air passages. It often occurs during a cold and is usually caused by a virus. Symptoms include cough with mucus (phlegm) and low-grade fever. This illness is contagious during the first few days and is spread through the air by coughing  and sneezing, or by direct contact (touching the sick person and then touching your own eyes, nose, or mouth).  If there is a lot of inflammation, air flow is restricted. The air passages may also go into spasm, especially if you have asthma. This causes wheezing and difficulty breathing even in people who do not have asthma.  Bronchitis usually lasts 7 to 14 days. The wheezing should improve with treatment during the first week. An inhaler is often prescribed to relax the air passages and stop wheezing. Antibiotics will be prescribed if your doctor thinks there is also a secondary bacterial infection.  Home care    If symptoms are severe, rest at home for the first 2 to 3 days. When you go back to your usual activities, don't let yourself get too tired.    Dont s'moke. Also avoid being exposed to secondhand smoke.    You may use over-the-counter medicine to control fever or pain, unless another medicine was prescribed. Note: If you have chronic liver or kidney disease or have ever had a stomach ulcer or gastrointestinal bleeding, talk with your healthcare provider before using these medicines. Also talk to your provider if you are taking medicine to prevent blood clots.) Aspirin should never be given to anyone younger than 18 years of age who is ill with a viral infection or fever. It may cause severe liver or brain damage.    Your appetite may be poor, so a light diet is fine. Stay well hydrated by drinking 6 to 8 glasses of fluids per day (such as water, soft drinks, sports drinks, juices, tea, or soup). Extra fluids will help loosen secretions in the nose and lungs.    Over-the-counter cough, cold, and sore-throat medicines will not shorten the length of the illness, but they may be helpful to reduce symptoms. (Note: Don't use decongestants if you have high blood pressure.)    If you were given an inhaler, use it exactly as directed. If you need to use it more often than prescribed, your condition may be  worsening. If this happens, contact your healthcare provider.    If prescribed, finish all antibiotic medicine, even if you are feeling better after only a few days.  Follow-up care  Follow up with your healthcare provider, or as advised. If you had an X-ray or ECG (electrocardiogram), a specialist will review it. You will be notified of any new findings that may affect your care.  If you are age 65 or older, or if you have a chronic lung disease or condition that affects your immune system, or you smoke, ask your healthcare provider about getting a pneumococcal vaccine and a yearly flu shot (influenza vaccine).  When to seek medical advice  Call your healthcare provider right away if any of these occur:    Fever of 100.4 F (38 C) or higher, or as directed by your healthcare provider    Coughing up increasing amounts of colored sputum    Weakness, drowsiness, headache, facial pain, ear pain, or a stiff neck  Call 911  Call 911 if any of these occur.    Coughing up blood    Worsening weakness, drowsiness, headache, or stiff neck    Increased wheezing not helped with medication, shortness of breath, or pain with breathing  [employer] last reviewed this educational content on 6/1/2018 2000-2021 [employer]. All rights reserved. This information is not intended as a substitute for professional medical care. Always follow your healthcare professional's instructions.           Patient Education     Eustachian tube dysfunction/serous otitis media   Earaches can happen without an infection. They can occur when air and fluid build up behind the eardrum. They may cause a feeling of fullness and discomfort. They may also impair hearing. This is called otitis media with effusion (OME) or serous otitis media. It means there is fluid in the middle ear. It is not the same as acute otitis media, which is often from an infection.  OME can happen when you have a cold if congestion blocks the passage that drains the  middle ear. This passage is called the eustachian tube. OME may also occur with nasal allergies or after a bacterial infection in the middle ear. Other causes are:    Trauma    Improper cleaning of wax from the ear    Bacterial infection of the mastoid bone (mastoiditis)    Tumor    Jaw pain    Changes in pressure, such as from flying or scuba diving    The pain or discomfort may come and go. You may hear clicking or popping sounds when you chew or swallow. You may feel that your balance is off. Or you may hear ringing in the ear.  It often takes from several weeks up to 3 months for the fluid to clear on its own. Oral pain relievers and ear drops help if there is pain. Decongestants and antihistamines sometimes help. Antibiotics don't help since there is no infection. Your healthcare provider may give you a nasal spray to help reduce swelling in the nose and eustachian tube. This can allow the ear to drain.  If your OME doesn't get better after 3 months, surgery may be used to drain the fluid. A small tube may also be put in the eardrum to help with drainage.  Because the middle ear fluid can become infected, watch for signs of an infection. These may develop later. They may include increased ear pain, fever, or drainage from the ear.  Home care  These home-care tips will help you take care of yourself:    You may use over-the-counter medicine as directed by your healthcare provider to control pain, unless medicine was prescribed. If you have chronic liver or kidney disease or ever had a stomach ulcer or GI bleeding, talk with your healthcare provider before using any medicines.    Aspirin should never be used in anyone younger than age 18 who has a fever. It may cause severe liver damage.    Ask your healthcare provider if you may use over-the-counter decongestants such as phenylephrine or pseudoephedrine. Keep in mind they are not always helpful.    Talk with your healthcare provider about using nasal spray  decongestants. Don't use them for more than 3 days, or as directed by your healthcare provider. Longer use can make congestion worse. Prescription nasal sprays from your healthcare provider don't often have such restrictions.    Antihistamines may help if you are also having allergy symptoms.    You may use medicines such as guaifenesin to thin mucus and help with drainage.  Follow-up care  Follow up with your healthcare provider or as advised if you are not feeling better after 3 days.  When to seek medical advice  Call your healthcare provider right away if any of these occur:    Ear pain that gets worse or that does not start to get better     Fever of 100.4 F (38 C) or higher, or as directed by your healthcare provider    Fluid or blood draining from the ear    Headache or sinus pain    Stiff neck    Unusual drowsiness or confusion  [employer] last reviewed this educational content on 12/1/2019 2000-2021 [employer]. All rights reserved. This information is not intended as a substitute for professional medical care. Always follow your healthcare professional's instructions.

## 2021-09-24 NOTE — PATIENT INSTRUCTIONS
Follow up as needed or if your symptoms worsen in any way.     Follow up with your primary care provider or clinic in about 1 week if your symptoms do not improve     Sherry Saucedo MD      Start doxycycline 2 times a day for 10 days always take with food to prevent nausea vomiting even the pharmacist tells you not to take it with food    Start albuterol inhaler for wheezing and cough     Start medrol dose pack in am     Start flonase 1 spray in each nostril in am and bedtime    For the headache take tylenol    If heart starts racing, headache severe, dizziness is worse to ER    Schedule an appointment with primary care at the  to follow up her headache and heart racing      Patient Education     Asthma (Adult)   Asthma is a disease where the medium and small air passages in the lung go into spasm and restrict air flow. Inflammation and swelling of the airways cause further blockage. During an acute asthma attack, these factors cause trouble breathing, wheezing, cough, and chest tightness.     An asthma attack can be triggered by many things. Common triggers include infections such as the common cold, bronchitis, and pneumonia. Irritants such as smoke or pollutants in the air, very cold air, emotional upset, and exercise can also trigger an attack. In many adults with asthma, allergies to dust, mold, pollen, and animal dander can cause an asthma attack. Skipping doses of daily asthma medicine can also bring on an asthma attack.   Asthma can be controlled using the correct medicines prescribed by your healthcare provider and staying away from known triggers including allergens and irritants.   Home care    Take prescribed medicine exactly at the times advised. If you need medicine such as from a handheld inhaler or aerosol breathing machine more than every 4 hours, contact your healthcare provider or get medical care right away. If you are prescribed an antibiotic or prednisone, take all of the medicine as  prescribed. Keep taking it even if you are feeling better after a few days.    Don't smoke. Stay away from the smoke of others.    Some people with asthma find their symptoms get worse when they take aspirin and non-steroidal or fever-reducing medicines such as ibuprofen and naproxen. Talk with your healthcare provider if you think this may apply to you.    Follow-up care  Follow up with your healthcare provider, or as advised. Always bring all of your current medicines to any appointments with your healthcare provider. Also bring a complete list of medicines, even those not taken for asthma. If you don't already have one, talk with your healthcare provider about making your own Asthma Action Plan.   A pneumonia (pneumococcal) vaccine and yearly flu shot (every fall) are advised. Ask your provider about this.   When to get medical advice  Call your healthcare provider or get medical care right away if any of these occur:      More wheezing or shortness of breath    Need to use your inhalers more often than normal without relief    Fever of 100.4 F (38 C) or higher, or as directed by your provider    Coughing up lots of dark-colored or bloody sputum (mucus)    Chest pain with each breath    If you use a peak flow meter as part of an Asthma Action Plan, and you are still in the yellow zone (50% to 80%) 15 minutes after using inhaler medicine.  Call 911  Call 911 if any of these occur:     Trouble walking or talking because you are short of breath    If you use a peak flow meter as part of an Asthma Action Plan, and you are still in the red zone (less than 50%) 15 minutes after using inhaler medicine    Lips or fingernails turn gray, purple, or blue    Feeling faint or loss of consciousness  [email or web address] last reviewed this educational content on 10/1/2019    1912-0956 [employer]. All rights reserved. This information is not intended as a substitute for professional medical care. Always follow your healthcare  professional's instructions.           Patient Education     Bronchitis, Antibiotic Treatment (Adult)    Bronchitis is an infection of the air passages (bronchial tubes) in your lungs. It often occurs when you have a cold. This illness is contagious during the first few days and is spread through the air by coughing and sneezing, or by direct contact (touching the sick person and then touching your own eyes, nose, or mouth).  Symptoms of bronchitis include cough with mucus (phlegm) and low-grade fever. Bronchitis usually lasts 7 to 14 days. Mild cases can be treated with simple home remedies. More severe infection is treated with an antibiotic.  Home care  Follow these guidelines when caring for yourself at home:    If your symptoms are severe, rest at home for the first 2 to 3 days. When you go back to your usual activities, don't let yourself get too tired.    Don't smoke. Also stay away from secondhand smoke.    You may use over-the-counter medicines to control fever or pain, unless another medicine was prescribed. If you have chronic liver or kidney disease or have ever had a stomach ulcer or gastrointestinal bleeding, talk with your healthcare provider before using these medicines. Also talk to your provider if you are taking medicine to prevent blood clots. Aspirin should never be given to anyone younger than 18 who is ill with a viral infection or fever. It may cause severe liver or brain damage.    Your appetite may be low, so a light diet is fine. Stay well hydrated by drinking 6 to 8 glasses of fluids per day. This includes water, soft drinks, sports drinks, juices, tea, or soup. Extra fluids will help loosen mucus in your nose and lungs.    Over-the-counter cough, cold, and sore-throat medicines will not shorten the length of the illness, but they may be helpful to reduce your symptoms. Don't use decongestants if you have high blood pressure.    Finish all antibiotic medicine. Do this even if you are  feeling better after only a few days.  Follow-up care  Follow up with your healthcare provider, or as advised. If you had an X-ray or ECG (electrocardiogram), a specialist will review it. You will be told of any new test results that may affect your care.  If you are age 65 or older, if you smoke, or if you have a chronic lung disease or condition that affects your immune system, ask your healthcare provider about getting a pneumococcal vaccine and a yearly flu shot (influenza vaccine).  When to seek medical advice  Call your healthcare provider right away if any of these occur:    Fever of 100.4 F (38 C) or higher, or as directed by your healthcare provider    Coughing up more sputum    Weakness, drowsiness, headache, facial pain, ear pain, or a stiff neck  Call 911  Call 911 if any of these occur.    Coughing up blood    Weakness, drowsiness, headache, or stiff neck that get worse    Trouble breathing, wheezing, or pain with breathing  [employer] last reviewed this educational content on 6/1/2018 2000-2021 [employer]. All rights reserved. This information is not intended as a substitute for professional medical care. Always follow your healthcare professional's instructions.           Patient Education     Viral or Bacterial Bronchitis with Wheezing (Adult)    Bronchitis is an infection of the air passages. It often occurs during a cold and is usually caused by a virus. Symptoms include cough with mucus (phlegm) and low-grade fever. This illness is contagious during the first few days and is spread through the air by coughing and sneezing, or by direct contact (touching the sick person and then touching your own eyes, nose, or mouth).  If there is a lot of inflammation, air flow is restricted. The air passages may also go into spasm, especially if you have asthma. This causes wheezing and difficulty breathing even in people who do not have asthma.  Bronchitis usually lasts 7 to 14 days. The  wheezing should improve with treatment during the first week. An inhaler is often prescribed to relax the air passages and stop wheezing. Antibiotics will be prescribed if your doctor thinks there is also a secondary bacterial infection.  Home care    If symptoms are severe, rest at home for the first 2 to 3 days. When you go back to your usual activities, don't let yourself get too tired.    Dont s'moke. Also avoid being exposed to secondhand smoke.    You may use over-the-counter medicine to control fever or pain, unless another medicine was prescribed. Note: If you have chronic liver or kidney disease or have ever had a stomach ulcer or gastrointestinal bleeding, talk with your healthcare provider before using these medicines. Also talk to your provider if you are taking medicine to prevent blood clots.) Aspirin should never be given to anyone younger than 18 years of age who is ill with a viral infection or fever. It may cause severe liver or brain damage.    Your appetite may be poor, so a light diet is fine. Stay well hydrated by drinking 6 to 8 glasses of fluids per day (such as water, soft drinks, sports drinks, juices, tea, or soup). Extra fluids will help loosen secretions in the nose and lungs.    Over-the-counter cough, cold, and sore-throat medicines will not shorten the length of the illness, but they may be helpful to reduce symptoms. (Note: Don't use decongestants if you have high blood pressure.)    If you were given an inhaler, use it exactly as directed. If you need to use it more often than prescribed, your condition may be worsening. If this happens, contact your healthcare provider.    If prescribed, finish all antibiotic medicine, even if you are feeling better after only a few days.  Follow-up care  Follow up with your healthcare provider, or as advised. If you had an X-ray or ECG (electrocardiogram), a specialist will review it. You will be notified of any new findings that may affect your  care.  If you are age 65 or older, or if you have a chronic lung disease or condition that affects your immune system, or you smoke, ask your healthcare provider about getting a pneumococcal vaccine and a yearly flu shot (influenza vaccine).  When to seek medical advice  Call your healthcare provider right away if any of these occur:    Fever of 100.4 F (38 C) or higher, or as directed by your healthcare provider    Coughing up increasing amounts of colored sputum    Weakness, drowsiness, headache, facial pain, ear pain, or a stiff neck  Call 911  Call 911 if any of these occur.    Coughing up blood    Worsening weakness, drowsiness, headache, or stiff neck    Increased wheezing not helped with medication, shortness of breath, or pain with breathing  [employer] last reviewed this educational content on 6/1/2018 2000-2021 [employer]. All rights reserved. This information is not intended as a substitute for professional medical care. Always follow your healthcare professional's instructions.           Patient Education     Eustachian tube dysfunction/serous otitis media   Earaches can happen without an infection. They can occur when air and fluid build up behind the eardrum. They may cause a feeling of fullness and discomfort. They may also impair hearing. This is called otitis media with effusion (OME) or serous otitis media. It means there is fluid in the middle ear. It is not the same as acute otitis media, which is often from an infection.  OME can happen when you have a cold if congestion blocks the passage that drains the middle ear. This passage is called the eustachian tube. OME may also occur with nasal allergies or after a bacterial infection in the middle ear. Other causes are:    Trauma    Improper cleaning of wax from the ear    Bacterial infection of the mastoid bone (mastoiditis)    Tumor    Jaw pain    Changes in pressure, such as from flying or scuba diving    The pain or discomfort  may come and go. You may hear clicking or popping sounds when you chew or swallow. You may feel that your balance is off. Or you may hear ringing in the ear.  It often takes from several weeks up to 3 months for the fluid to clear on its own. Oral pain relievers and ear drops help if there is pain. Decongestants and antihistamines sometimes help. Antibiotics don't help since there is no infection. Your healthcare provider may give you a nasal spray to help reduce swelling in the nose and eustachian tube. This can allow the ear to drain.  If your OME doesn't get better after 3 months, surgery may be used to drain the fluid. A small tube may also be put in the eardrum to help with drainage.  Because the middle ear fluid can become infected, watch for signs of an infection. These may develop later. They may include increased ear pain, fever, or drainage from the ear.  Home care  These home-care tips will help you take care of yourself:    You may use over-the-counter medicine as directed by your healthcare provider to control pain, unless medicine was prescribed. If you have chronic liver or kidney disease or ever had a stomach ulcer or GI bleeding, talk with your healthcare provider before using any medicines.    Aspirin should never be used in anyone younger than age 18 who has a fever. It may cause severe liver damage.    Ask your healthcare provider if you may use over-the-counter decongestants such as phenylephrine or pseudoephedrine. Keep in mind they are not always helpful.    Talk with your healthcare provider about using nasal spray decongestants. Don't use them for more than 3 days, or as directed by your healthcare provider. Longer use can make congestion worse. Prescription nasal sprays from your healthcare provider don't often have such restrictions.    Antihistamines may help if you are also having allergy symptoms.    You may use medicines such as guaifenesin to thin mucus and help with  drainage.  Follow-up care  Follow up with your healthcare provider or as advised if you are not feeling better after 3 days.  When to seek medical advice  Call your healthcare provider right away if any of these occur:    Ear pain that gets worse or that does not start to get better     Fever of 100.4 F (38 C) or higher, or as directed by your healthcare provider    Fluid or blood draining from the ear    Headache or sinus pain    Stiff neck    Unusual drowsiness or confusion  [employer] last reviewed this educational content on 12/1/2019 2000-2021 [employer]. All rights reserved. This information is not intended as a substitute for professional medical care. Always follow your healthcare professional's instructions.

## 2021-09-26 LAB — SARS-COV-2 RNA RESP QL NAA+PROBE: NEGATIVE

## 2022-01-24 ENCOUNTER — HOSPITAL ENCOUNTER (EMERGENCY)
Facility: CLINIC | Age: 37
Discharge: HOME | End: 2022-01-25
Attending: EMERGENCY MEDICINE | Admitting: EMERGENCY MEDICINE
Payer: COMMERCIAL

## 2022-01-24 VITALS
BODY MASS INDEX: 30.99 KG/M2 | SYSTOLIC BLOOD PRESSURE: 110 MMHG | HEART RATE: 67 BPM | DIASTOLIC BLOOD PRESSURE: 68 MMHG | OXYGEN SATURATION: 99 % | RESPIRATION RATE: 18 BRPM | HEIGHT: 65 IN | TEMPERATURE: 97.6 F | WEIGHT: 186 LBS

## 2022-01-24 DIAGNOSIS — B34.9 VIRAL ILLNESS: ICD-10-CM

## 2022-01-24 DIAGNOSIS — J45.21 MILD INTERMITTENT ASTHMA WITH EXACERBATION: ICD-10-CM

## 2022-01-24 LAB
FLUAV RNA SPEC QL NAA+PROBE: NEGATIVE
FLUBV RNA RESP QL NAA+PROBE: NEGATIVE
GLUCOSE BLDC GLUCOMTR-MCNC: 112 MG/DL (ref 70–99)
SARS-COV-2 RNA RESP QL NAA+PROBE: NEGATIVE

## 2022-01-24 PROCEDURE — 87636 SARSCOV2 & INF A&B AMP PRB: CPT | Performed by: EMERGENCY MEDICINE

## 2022-01-24 PROCEDURE — 93005 ELECTROCARDIOGRAM TRACING: CPT

## 2022-01-24 PROCEDURE — 94640 AIRWAY INHALATION TREATMENT: CPT

## 2022-01-24 PROCEDURE — 250N000012 HC RX MED GY IP 250 OP 636 PS 637: Performed by: EMERGENCY MEDICINE

## 2022-01-24 PROCEDURE — 250N000009 HC RX 250: Performed by: EMERGENCY MEDICINE

## 2022-01-24 PROCEDURE — 99285 EMERGENCY DEPT VISIT HI MDM: CPT | Mod: 25

## 2022-01-24 PROCEDURE — C9803 HOPD COVID-19 SPEC COLLECT: HCPCS

## 2022-01-24 RX ORDER — PREDNISONE 20 MG/1
60 TABLET ORAL ONCE
Status: COMPLETED | OUTPATIENT
Start: 2022-01-24 | End: 2022-01-24

## 2022-01-24 RX ORDER — IPRATROPIUM BROMIDE AND ALBUTEROL SULFATE 2.5; .5 MG/3ML; MG/3ML
3 SOLUTION RESPIRATORY (INHALATION)
Status: DISCONTINUED | OUTPATIENT
Start: 2022-01-24 | End: 2022-01-25 | Stop reason: HOSPADM

## 2022-01-24 RX ADMIN — IPRATROPIUM BROMIDE AND ALBUTEROL SULFATE 3 ML: .5; 3 SOLUTION RESPIRATORY (INHALATION) at 23:55

## 2022-01-24 RX ADMIN — PREDNISONE 60 MG: 20 TABLET ORAL at 23:55

## 2022-01-24 ASSESSMENT — MIFFLIN-ST. JEOR: SCORE: 1539.57

## 2022-01-25 ENCOUNTER — APPOINTMENT (OUTPATIENT)
Dept: GENERAL RADIOLOGY | Facility: CLINIC | Age: 37
End: 2022-01-25
Attending: EMERGENCY MEDICINE
Payer: COMMERCIAL

## 2022-01-25 LAB
ANION GAP SERPL CALCULATED.3IONS-SCNC: 5 MMOL/L (ref 3–14)
ATRIAL RATE - MUSE: 57 BPM
BASOPHILS # BLD AUTO: 0 10E3/UL (ref 0–0.2)
BASOPHILS NFR BLD AUTO: 0 %
BUN SERPL-MCNC: 14 MG/DL (ref 7–30)
CALCIUM SERPL-MCNC: 8.4 MG/DL (ref 8.5–10.1)
CHLORIDE BLD-SCNC: 108 MMOL/L (ref 94–109)
CO2 SERPL-SCNC: 25 MMOL/L (ref 20–32)
CREAT SERPL-MCNC: 0.72 MG/DL (ref 0.52–1.04)
DIASTOLIC BLOOD PRESSURE - MUSE: NORMAL MMHG
EOSINOPHIL # BLD AUTO: 1.1 10E3/UL (ref 0–0.7)
EOSINOPHIL NFR BLD AUTO: 11 %
ERYTHROCYTE [DISTWIDTH] IN BLOOD BY AUTOMATED COUNT: 13.2 % (ref 10–15)
GFR SERPL CREATININE-BSD FRML MDRD: >90 ML/MIN/1.73M2
GLUCOSE BLD-MCNC: 88 MG/DL (ref 70–99)
HCT VFR BLD AUTO: 40.7 % (ref 35–47)
HGB BLD-MCNC: 12.8 G/DL (ref 11.7–15.7)
IMM GRANULOCYTES # BLD: 0 10E3/UL
IMM GRANULOCYTES NFR BLD: 0 %
INTERPRETATION ECG - MUSE: NORMAL
LYMPHOCYTES # BLD AUTO: 3 10E3/UL (ref 0.8–5.3)
LYMPHOCYTES NFR BLD AUTO: 32 %
MCH RBC QN AUTO: 27.7 PG (ref 26.5–33)
MCHC RBC AUTO-ENTMCNC: 31.4 G/DL (ref 31.5–36.5)
MCV RBC AUTO: 88 FL (ref 78–100)
MONOCYTES # BLD AUTO: 0.5 10E3/UL (ref 0–1.3)
MONOCYTES NFR BLD AUTO: 5 %
NEUTROPHILS # BLD AUTO: 4.8 10E3/UL (ref 1.6–8.3)
NEUTROPHILS NFR BLD AUTO: 52 %
NRBC # BLD AUTO: 0 10E3/UL
NRBC BLD AUTO-RTO: 0 /100
P AXIS - MUSE: 51 DEGREES
PLATELET # BLD AUTO: 219 10E3/UL (ref 150–450)
POTASSIUM BLD-SCNC: 4.1 MMOL/L (ref 3.4–5.3)
PR INTERVAL - MUSE: 138 MS
QRS DURATION - MUSE: 70 MS
QT - MUSE: 422 MS
QTC - MUSE: 410 MS
R AXIS - MUSE: 23 DEGREES
RBC # BLD AUTO: 4.62 10E6/UL (ref 3.8–5.2)
SODIUM SERPL-SCNC: 138 MMOL/L (ref 133–144)
SYSTOLIC BLOOD PRESSURE - MUSE: NORMAL MMHG
T AXIS - MUSE: 29 DEGREES
TROPONIN I SERPL HS-MCNC: <3 NG/L
VENTRICULAR RATE- MUSE: 57 BPM
WBC # BLD AUTO: 9.5 10E3/UL (ref 4–11)

## 2022-01-25 PROCEDURE — 85025 COMPLETE CBC W/AUTO DIFF WBC: CPT | Performed by: EMERGENCY MEDICINE

## 2022-01-25 PROCEDURE — 258N000003 HC RX IP 258 OP 636: Performed by: EMERGENCY MEDICINE

## 2022-01-25 PROCEDURE — 84484 ASSAY OF TROPONIN QUANT: CPT | Performed by: EMERGENCY MEDICINE

## 2022-01-25 PROCEDURE — 82435 ASSAY OF BLOOD CHLORIDE: CPT | Performed by: EMERGENCY MEDICINE

## 2022-01-25 PROCEDURE — 71046 X-RAY EXAM CHEST 2 VIEWS: CPT

## 2022-01-25 PROCEDURE — 96360 HYDRATION IV INFUSION INIT: CPT

## 2022-01-25 PROCEDURE — 36415 COLL VENOUS BLD VENIPUNCTURE: CPT | Performed by: EMERGENCY MEDICINE

## 2022-01-25 RX ORDER — PREDNISONE 20 MG/1
TABLET ORAL
Qty: 10 TABLET | Refills: 0 | Status: SHIPPED | OUTPATIENT
Start: 2022-01-25 | End: 2024-03-09

## 2022-01-25 RX ADMIN — SODIUM CHLORIDE 1000 ML: 9 INJECTION, SOLUTION INTRAVENOUS at 00:05

## 2022-01-25 ASSESSMENT — ENCOUNTER SYMPTOMS
FEVER: 1
COUGH: 1
DIZZINESS: 1
NAUSEA: 1
FREQUENCY: 0
DYSURIA: 0
VOMITING: 0
DIFFICULTY URINATING: 0
DIARRHEA: 0
HEADACHES: 1
HEMATURIA: 0

## 2022-01-25 NOTE — ED TRIAGE NOTES
Here for concern of coughing started 2 days ago associated with unable to sleep, mild sob, dizziness, and fever. Stated tested negative for Covid 1 week ago. ABCs intact.

## 2022-01-25 NOTE — ED PROVIDER NOTES
"  History   Chief Complaint:  Shortness of Breath and Dizziness     The history is provided by the patient. A  was used (Bhutanese).      Kayla Howell is a 37 year old female with history of diabetes and asthma who presents for multiple symptoms. 3 days ago she developed fever, cough, dry mouth, dizziness, nausea, and headache. She has been using her inhalers with minimal relief. The patient mentions that she has been having issues with her nose and throat recently. Denies any diarrhea, vomiting, or urinary symptoms. Denies any known sick contacts. She denies any possibility of pregnancy.     Review of Systems   Constitutional: Positive for fever.   Respiratory: Positive for cough.    Gastrointestinal: Positive for nausea. Negative for diarrhea and vomiting.   Genitourinary: Negative for difficulty urinating, dysuria, frequency, hematuria and urgency.   Neurological: Positive for dizziness and headaches.   All other systems reviewed and are negative.    Allergies:  The patient has no known allergies.     Medications:  Albuterol  Antivert  Medrol Dosepak  Zofran  Levothyroxine  Prilosec  Loestrin    Past Medical History:     Diabetes  Thyroid disease  Asthma  GERD    Social History:  The patient presents to the ED alone    Physical Exam     Patient Vitals for the past 24 hrs:   BP Temp Temp src Pulse Resp SpO2 Height Weight   01/24/22 2111 110/68 97.6  F (36.4  C) Temporal 67 18 99 % 1.651 m (5' 5\") 84.4 kg (186 lb)     Physical Exam  General: Patient is awake, alert and interactive when I enter the room  Head: The scalp, face, and head appear normal  Eyes: The pupils are equal, round, and reactive to light. Conjunctivae and sclerae are normal  ENT: External acoustic canals are normal. The oropharynx is normal without erythema. Uvula is in the midline  Neck: Normal range of motion. No anterior cervical lymphadenopathy noted  CV: Regular rate. S1/S2. No murmurs.   Resp: no respiratory distress.  " Inspiratory and expiratory wheezes. Prolonged expiratory phase. No rales noted. No asymmetry to breath sounds.   GI: Abdomen is soft, no rigidity, guarding, or rebound. No distension. No tenderness to palpation in any quadrant.     MS: Normal tone. Joints grossly normal without effusions. No asymmetric leg swelling, calf or thigh tenderness.    Skin: No rash or lesions noted. Normal capillary refill noted  Neuro:Speech is normal and fluent. Face is symmetric. Moving all extremities.   Psych:  Normal affect.  Appropriate interactions.    Emergency Department Course   ECG  ECG obtained at 2351, ECG read at 2357  Sinus bradycardia. Low voltage QRS. Borderline ECG.   No significant change as compared to prior, dated 1/5/21.  Rate 57 bpm. IA interval 138 ms. QRS duration 70 ms. QT/QTc 422/410 ms. P-R-T axes 51 23 29.     Imaging:  XR Chest 2 Views   Final Result   IMPRESSION: Negative chest.        Report per radiology    Laboratory:  Labs Ordered and Resulted from Time of ED Arrival to Time of ED Departure   GLUCOSE BY METER - Abnormal       Result Value    GLUCOSE BY METER POCT 112 (*)    BASIC METABOLIC PANEL - Abnormal    Sodium 138      Potassium 4.1      Chloride 108      Carbon Dioxide (CO2) 25      Anion Gap 5      Urea Nitrogen 14      Creatinine 0.72      Calcium 8.4 (*)     Glucose 88      GFR Estimate >90     CBC WITH PLATELETS AND DIFFERENTIAL - Abnormal    WBC Count 9.5      RBC Count 4.62      Hemoglobin 12.8      Hematocrit 40.7      MCV 88      MCH 27.7      MCHC 31.4 (*)     RDW 13.2      Platelet Count 219      % Neutrophils 52      % Lymphocytes 32      % Monocytes 5      % Eosinophils 11      % Basophils 0      % Immature Granulocytes 0      NRBCs per 100 WBC 0      Absolute Neutrophils 4.8      Absolute Lymphocytes 3.0      Absolute Monocytes 0.5      Absolute Eosinophils 1.1 (*)     Absolute Basophils 0.0      Absolute Immature Granulocytes 0.0      Absolute NRBCs 0.0     INFLUENZA A/B & SARS-COV2  PCR MULTIPLEX - Normal    Influenza A PCR Negative      Influenza B PCR Negative      SARS CoV2 PCR Negative     TROPONIN I - Normal    Troponin I High Sensitivity <3          Emergency Department Course:    Reviewed:  I reviewed nursing notes, vitals, past medical history and Care Everywhere    Assessments:  2316 I obtained history and examined the patient as noted above.     0046 I rechecked the patient and explained findings.     Interventions:  2355 Duoneb 3 mL Nebulization  2355 Deltasone 60 mg PO  0005 NS 1L IV    Disposition:  The patient was discharged to home.     Impression & Plan     Medical Decision Making:  Kayla Howell is a 37 year old female with a PMH of asthma who presents for evaluation of shortness of breath, wheezing, subjective fever, dizziness nausea and headache. COVID and influenza negative. CXR is clear, no evidence of pneumonia.  Signs and symptoms are consistent with asthma exacerbation from viral illness.  A broad differential was considered including foreign body, asthma, pneumonia, bronchitis, reactive airway disease, pneumothorax, cardiac equivalent, viral induced wheezing, allergic phenomena, etc.  The patient was given Duoneb and Prednisone in the emergency department. She feels improved after interventions here in ED.  Low concern for ACS given reassuring EKG and negative troponin.  No indication for hospitalization at this time including no hypoxia, no marked increase in respiratory rate, minimal to no retractions.   Supportive outpatient management is indicated, medications for discharge noted above.  Close followup with primary care physician.  Return if increased wheezing, progressive shortness of breath, develops fever greater than 102. All questions were answered and the patient is amenable for discharge at this time.      Diagnosis:    ICD-10-CM    1. Mild intermittent asthma with exacerbation  J45.21    2. Viral illness  B34.9      Discharge Medications:  Discharge  Medication List as of 1/25/2022 12:49 AM      START taking these medications    Details   predniSONE (DELTASONE) 20 MG tablet Take two tablets (= 40mg) each day for 5 (five) days, Disp-10 tablet, R-0, Local Print           Scribe Disclosure:  STACIA, Zach Bocanegra, am serving as a scribe at 11:33 PM on 1/24/2022 to document services personally performed by Alex Fernandez MD based on my observations and the provider's statements to me.          Alex Fernandez MD  01/25/22 0144

## 2023-06-06 ENCOUNTER — APPOINTMENT (OUTPATIENT)
Dept: INTERPRETER SERVICES | Facility: CLINIC | Age: 38
End: 2023-06-06
Payer: COMMERCIAL

## 2023-06-26 ENCOUNTER — APPOINTMENT (OUTPATIENT)
Dept: INTERPRETER SERVICES | Facility: CLINIC | Age: 38
End: 2023-06-26
Payer: COMMERCIAL

## 2023-07-28 ENCOUNTER — APPOINTMENT (OUTPATIENT)
Dept: INTERPRETER SERVICES | Facility: CLINIC | Age: 38
End: 2023-07-28
Payer: COMMERCIAL

## 2023-07-31 ENCOUNTER — ANCILLARY PROCEDURE (OUTPATIENT)
Dept: GENERAL RADIOLOGY | Facility: CLINIC | Age: 38
End: 2023-07-31
Attending: FAMILY MEDICINE
Payer: COMMERCIAL

## 2023-07-31 ENCOUNTER — OFFICE VISIT (OUTPATIENT)
Dept: FAMILY MEDICINE | Facility: CLINIC | Age: 38
End: 2023-07-31
Payer: COMMERCIAL

## 2023-07-31 VITALS
HEIGHT: 61 IN | OXYGEN SATURATION: 100 % | BODY MASS INDEX: 29.27 KG/M2 | WEIGHT: 155 LBS | RESPIRATION RATE: 16 BRPM | DIASTOLIC BLOOD PRESSURE: 61 MMHG | TEMPERATURE: 97.7 F | HEART RATE: 65 BPM | SYSTOLIC BLOOD PRESSURE: 90 MMHG

## 2023-07-31 DIAGNOSIS — G89.29 CHRONIC PAIN OF RIGHT KNEE: Primary | ICD-10-CM

## 2023-07-31 DIAGNOSIS — M25.561 CHRONIC PAIN OF RIGHT KNEE: ICD-10-CM

## 2023-07-31 DIAGNOSIS — E55.9 VITAMIN D DEFICIENCY: ICD-10-CM

## 2023-07-31 DIAGNOSIS — M25.561 CHRONIC PAIN OF RIGHT KNEE: Primary | ICD-10-CM

## 2023-07-31 DIAGNOSIS — G89.29 CHRONIC PAIN OF RIGHT KNEE: ICD-10-CM

## 2023-07-31 LAB — ERYTHROCYTE [SEDIMENTATION RATE] IN BLOOD BY WESTERGREN METHOD: 26 MM/HR (ref 0–20)

## 2023-07-31 PROCEDURE — 84443 ASSAY THYROID STIM HORMONE: CPT | Performed by: FAMILY MEDICINE

## 2023-07-31 PROCEDURE — 85652 RBC SED RATE AUTOMATED: CPT | Performed by: FAMILY MEDICINE

## 2023-07-31 PROCEDURE — 73562 X-RAY EXAM OF KNEE 3: CPT | Mod: TC | Performed by: RADIOLOGY

## 2023-07-31 PROCEDURE — 84550 ASSAY OF BLOOD/URIC ACID: CPT | Performed by: FAMILY MEDICINE

## 2023-07-31 PROCEDURE — 82306 VITAMIN D 25 HYDROXY: CPT | Performed by: FAMILY MEDICINE

## 2023-07-31 PROCEDURE — 83036 HEMOGLOBIN GLYCOSYLATED A1C: CPT | Performed by: FAMILY MEDICINE

## 2023-07-31 PROCEDURE — 99203 OFFICE O/P NEW LOW 30 MIN: CPT | Performed by: FAMILY MEDICINE

## 2023-07-31 PROCEDURE — 86140 C-REACTIVE PROTEIN: CPT | Performed by: FAMILY MEDICINE

## 2023-07-31 PROCEDURE — 36415 COLL VENOUS BLD VENIPUNCTURE: CPT | Performed by: FAMILY MEDICINE

## 2023-07-31 PROCEDURE — 86200 CCP ANTIBODY: CPT | Performed by: FAMILY MEDICINE

## 2023-07-31 PROCEDURE — 86431 RHEUMATOID FACTOR QUANT: CPT | Performed by: FAMILY MEDICINE

## 2023-07-31 ASSESSMENT — PAIN SCALES - GENERAL: PAINLEVEL: SEVERE PAIN (6)

## 2023-07-31 NOTE — LETTER
August 2, 2023      Meaghan Garcia  [address]   [facility] [address]        Dear ,    We are writing to inform you of your test results.  Your labs were normal except for your ESR. ESR (sedrate) tests of inflammation. I would recommend rechecking it in 3-6 months.       Resulted Orders   Rheumatoid factor   Result Value Ref Range    Rheumatoid Factor <6 <12 IU/mL   Uric acid   Result Value Ref Range    Uric Acid 3.6 2.4 - 5.7 mg/dL   Cyclic Citrullinated Peptide Antibody IgG   Result Value Ref Range    Cyclic Citrullinated Peptide Antibody IgG 0.9 <7.0 U/mL      Comment:      Negative   ESR: Erythrocyte sedimentation rate   Result Value Ref Range    Erythrocyte Sedimentation Rate 26 (H) 0 - 20 mm/hr   CRP, inflammation   Result Value Ref Range    CRP Inflammation <3.00 <5.00 mg/L   TSH with free T4 reflex   Result Value Ref Range    TSH 3.67 0.30 - 4.20 uIU/mL   Hemoglobin A1c   Result Value Ref Range    Hemoglobin A1C 5.4 0.0 - 5.6 %      Comment:      Normal <5.7%   Prediabetes 5.7-6.4%    Diabetes 6.5% or higher     Note: Adopted from ADA consensus guidelines.   Vitamin D Deficiency   Result Value Ref Range    Vitamin D, Total (25-Hydroxy) 26 20 - 75 ug/L    Narrative    Season, race, dietary intake, and treatment affect the concentration of 25-hydroxy-Vitamin D. Values may decrease during winter months and increase during summer months. Values 20-29 ug/L may indicate Vitamin D insufficiency and values <20 ug/L may indicate Vitamin D deficiency.    Vitamin D determination is routinely performed by an immunoassay specific for 25 hydroxyvitamin D3.  If an individual is on vitamin D2(ergocalciferol) supplementation, please specify 25 OH vitamin D2 and D3 level determination by LCMSMS test [id number].         If you have any questions or concerns, please call the clinic at the number listed above.       Sincerely,      Vasile White MD

## 2023-07-31 NOTE — PROGRESS NOTES
Assessment & Plan   Meaghan was seen today for musculoskeletal problem.    Diagnoses and all orders for this visit:    Chronic pain of right knee  Patient presents to clinic for evaluation of intermittent episodes of right knee pain and swelling.  Differential diagnosis includes early osteoarthritis, autoimmune disorder, tendinitis, meniscus/tendon injuries.  Basic x-ray was ordered today as well as autoimmune labs.  I would recommend proceeding with physical therapy and an MRI if symptoms fail to improve.  -     XR Knee Right 3 Views; Future  -     Rheumatoid factor; Future  -     Uric acid; Future  -     Cyclic Citrullinated Peptide Antibody IgG; Future  -     ESR: Erythrocyte sedimentation rate; Future  -     CRP, inflammation; Future  -     TSH with free T4 reflex; Future  -     Hemoglobin A1c; Future    Vitamin D deficiency  -     Vitamin D Deficiency; Future    Other orders  -     REVIEW OF HEALTH MAINTENANCE PROTOCOL ORDERS      Patient is due for routine physical with Pap smear.  She was given an appointment for August 14, 2023.    Vasile White MD  [facility]   Meaghan is a 38 year old, presenting for the following health issues:  Musculoskeletal Problem        7/31/2023     4:00 PM   Additional Questions   Roomed by Shavon ANAYA   Accompanied by no       History of Present Illness       Reason for visit:  Right knee pain    She eats 0-1 servings of fruits and vegetables daily.She consumes 1 sweetened beverage(s) daily.She exercises with enough effort to increase her heart rate 9 or less minutes per day.  She exercises with enough effort to increase her heart rate 3 or less days per week.   She is taking medications regularly.  Patient presents to clinic for evaluation of intermittent right knee pain.  Current pain is on the scale of 8-9 out of 10.  Denies any swelling.  Also reports intermittent episodes of lower back pain.  Patient denies any rashes.  Denies any  "photosensitivity or joint swelling.  Denies any other joint pain.  Family history is negative for autoimmune disorders.    Review of Systems   Constitutional, HEENT, cardiovascular, pulmonary, gi and gu systems are negative, except as otherwise noted.      Objective    BP 90/61 (BP Location: Right arm, Patient Position: Sitting, Cuff Size: Adult Large)   Pulse 65   Temp 97.7  F (36.5  C) (Temporal)   Resp 16   Ht 1.557 m (5' 1.3\")   Wt 70.3 kg (155 lb)   SpO2 100%   BMI 29.00 kg/m    Body mass index is 29 kg/m .  Physical Exam   GENERAL: healthy, alert and no distress  RESP: lungs clear to auscultation - no rales, rhonchi or wheezes  CV: regular rate and rhythm, normal S1 S2, no S3 or S4, no murmur, click or rub, no peripheral edema and peripheral pulses strong  MS: Moderate tenderness to palpation on the right lateral knee joint.  No swelling or ecchymosis noted.  Range of motion is restricted by pain.    No results found for any visits on 07/31/23.                "

## 2023-08-01 LAB
CRP SERPL-MCNC: <3 MG/L
DEPRECATED CALCIDIOL+CALCIFEROL SERPL-MC: 26 UG/L (ref 20–75)
HBA1C MFR BLD: 5.4 % (ref 0–5.6)
TSH SERPL DL<=0.005 MIU/L-ACNC: 3.67 UIU/ML (ref 0.3–4.2)
URATE SERPL-MCNC: 3.6 MG/DL (ref 2.4–5.7)

## 2023-08-02 LAB
CCP AB SER IA-ACNC: 0.9 U/ML
RHEUMATOID FACT SER NEPH-ACNC: <6 IU/ML

## 2023-08-02 NOTE — RESULT ENCOUNTER NOTE
Trivial elevation in the setting of normal rheumatological labs.   Repeat in 3 months.   MD Christopher

## 2023-08-14 ENCOUNTER — OFFICE VISIT (OUTPATIENT)
Dept: FAMILY MEDICINE | Facility: CLINIC | Age: 38
End: 2023-08-14
Payer: COMMERCIAL

## 2023-08-14 VITALS
SYSTOLIC BLOOD PRESSURE: 89 MMHG | OXYGEN SATURATION: 96 % | HEIGHT: 61 IN | HEART RATE: 71 BPM | DIASTOLIC BLOOD PRESSURE: 61 MMHG | WEIGHT: 153.7 LBS | RESPIRATION RATE: 16 BRPM | BODY MASS INDEX: 29.02 KG/M2 | TEMPERATURE: 97.3 F

## 2023-08-14 DIAGNOSIS — M25.561 CHRONIC PAIN OF RIGHT KNEE: ICD-10-CM

## 2023-08-14 DIAGNOSIS — G89.29 CHRONIC PAIN OF RIGHT KNEE: ICD-10-CM

## 2023-08-14 DIAGNOSIS — Z31.41 FERTILITY TESTING: ICD-10-CM

## 2023-08-14 DIAGNOSIS — Z11.59 NEED FOR HEPATITIS C SCREENING TEST: ICD-10-CM

## 2023-08-14 DIAGNOSIS — Z00.00 ROUTINE GENERAL MEDICAL EXAMINATION AT A HEALTH CARE FACILITY: Primary | ICD-10-CM

## 2023-08-14 DIAGNOSIS — Z23 NEED FOR DIPHTHERIA-TETANUS-PERTUSSIS (TDAP) VACCINE: ICD-10-CM

## 2023-08-14 DIAGNOSIS — Z12.4 CERVICAL CANCER SCREENING: ICD-10-CM

## 2023-08-14 DIAGNOSIS — Z11.4 SCREENING FOR HIV (HUMAN IMMUNODEFICIENCY VIRUS): ICD-10-CM

## 2023-08-14 DIAGNOSIS — E55.9 VITAMIN D DEFICIENCY: ICD-10-CM

## 2023-08-14 PROCEDURE — 87624 HPV HI-RISK TYP POOLED RSLT: CPT | Performed by: FAMILY MEDICINE

## 2023-08-14 PROCEDURE — 99214 OFFICE O/P EST MOD 30 MIN: CPT | Mod: 25 | Performed by: FAMILY MEDICINE

## 2023-08-14 PROCEDURE — G0145 SCR C/V CYTO,THINLAYER,RESCR: HCPCS | Performed by: FAMILY MEDICINE

## 2023-08-14 PROCEDURE — 99395 PREV VISIT EST AGE 18-39: CPT | Performed by: FAMILY MEDICINE

## 2023-08-14 RX ORDER — ERGOCALCIFEROL 1.25 MG/1
50000 CAPSULE, LIQUID FILLED ORAL WEEKLY
Qty: 12 CAPSULE | Refills: 0 | Status: SHIPPED | OUTPATIENT
Start: 2023-08-14 | End: 2023-10-31

## 2023-08-14 RX ORDER — DICLOFENAC SODIUM 75 MG/1
75 TABLET, DELAYED RELEASE ORAL DAILY
Qty: 30 TABLET | Refills: 0 | Status: SHIPPED | OUTPATIENT
Start: 2023-08-14 | End: 2023-10-23

## 2023-08-14 ASSESSMENT — PAIN SCALES - GENERAL: PAINLEVEL: SEVERE PAIN (6)

## 2023-08-14 NOTE — PROGRESS NOTES
SUBJECTIVE:   CC: Meaghan is an 38 year old who presents for preventive health visit.       8/14/2023    12:40 PM   Additional Questions   Roomed by Shavon ANAYA   Accompanied by n/a       History of Present Illness       Reason for visit:  Right knee pain    She eats 0-1 servings of fruits and vegetables daily.She consumes 1 sweetened beverage(s) daily.She exercises with enough effort to increase her heart rate 9 or less minutes per day.  She exercises with enough effort to increase her heart rate 3 or less days per week.   She is taking medications regularly.      BP Readings from Last 6 Encounters:   08/14/23 (!) 89/61   07/31/23 90/61     Social History     Tobacco Use    Smoking status: Never     Passive exposure: Never    Smokeless tobacco: Never   Substance Use Topics    Alcohol use: Not on file     Reviewed orders with patient.  Reviewed health maintenance and updated orders accordingly - Yes  Lab work is in process    Breast Cancer Screening:    FHS-7:     History of abnormal Pap smear: NO - age 30-65 PAP every 5 years with negative HPV co-testing recommended     Reviewed and updated as needed this visit by clinical staff   Tobacco  Allergies  Meds              Reviewed and updated as needed this visit by Provider                     Review of Systems  CONSTITUTIONAL: NEGATIVE for fever, chills, change in weight  INTEGUMENTARU/SKIN: NEGATIVE for worrisome rashes, moles or lesions  EYES: NEGATIVE for vision changes or irritation  ENT: NEGATIVE for ear, mouth and throat problems  RESP: NEGATIVE for significant cough or SOB  BREAST: NEGATIVE for masses, tenderness or discharge  CV: NEGATIVE for chest pain, palpitations or peripheral edema  GI: NEGATIVE for nausea, abdominal pain, heartburn, or change in bowel habits  : NEGATIVE for unusual urinary or vaginal symptoms. Periods are regular.  MUSCULOSKELETAL: NEGATIVE for significant arthralgias or myalgia  NEURO: NEGATIVE for weakness, dizziness or  "paresthesias  PSYCHIATRIC: NEGATIVE for changes in mood or affect     OBJECTIVE:   BP (!) 89/61 (BP Location: Left arm, Patient Position: Sitting, Cuff Size: Adult Large)   Pulse 71   Temp 97.3  F (36.3  C) (Temporal)   Resp 16   Ht 1.557 m (5' 1.3\")   Wt 69.7 kg (153 lb 11.2 oz)   LMP 07/31/2023 (Exact Date)   SpO2 96%   BMI 28.76 kg/m    Physical Exam  GENERAL: healthy, alert and no distress  EYES: Eyes grossly normal to inspection, PERRL and conjunctivae and sclerae normal  HENT: ear canals and TM's normal, nose and mouth without ulcers or lesions  NECK: no adenopathy, no asymmetry, masses, or scars and thyroid normal to palpation  RESP: lungs clear to auscultation - no rales, rhonchi or wheezes  BREAST: normal without masses, tenderness or nipple discharge and no palpable axillary masses or adenopathy  CV: regular rate and rhythm, normal S1 S2, no S3 or S4, no murmur, click or rub, no peripheral edema and peripheral pulses strong  ABDOMEN: soft, nontender, no hepatosplenomegaly, no masses and bowel sounds normal   (female): normal female external genitalia, normal urethral meatus, vaginal mucosa pink, moist, well rugated, and normal cervix/adnexa/uterus without masses or discharge  MS: no gross musculoskeletal defects noted, no edema  SKIN: no suspicious lesions or rashes  NEURO: Normal strength and tone, mentation intact and speech normal  PSYCH: mentation appears normal, affect normal/bright    Diagnostic Test Results:  Labs reviewed in Epic  No results found for this or any previous visit (from the past 24 hour(s)).    ASSESSMENT/PLAN:       ICD-10-CM    1. Routine general medical examination at a health care facility  Z00.00 Lipid panel reflex to direct LDL Fasting      2. Screening for HIV (human immunodeficiency virus)  Z11.4 HIV Antigen Antibody Combo      3. Need for hepatitis C screening test  Z11.59 Hepatitis C antibody      4. Cervical cancer screening  Z12.4 Pap Screen with HPV - " "recommended age 30 - 65 years      5. Chronic pain of right knee  M25.561 MR Knee Right w/o Contrast    G89.29 diclofenac (VOLTAREN) 75 MG EC tablet      6. Vitamin D deficiency  E55.9 vitamin D2 (ERGOCALCIFEROL) 01812 units (1250 mcg) capsule      7. Need for diphtheria-tetanus-pertussis (Tdap) vaccine  Z23 TDAP 10-64Y (ADACEL,BOOSTRIX)      8. Fertility testing  Z31.41 Follicle stimulating hormone     Luteinizing Hormone     Anti-Mullerian hormone            COUNSELING:  Reviewed preventive health counseling, as reflected in patient instructions      BMI:   Estimated body mass index is 28.76 kg/m  as calculated from the following:    Height as of this encounter: 1.557 m (5' 1.3\").    Weight as of this encounter: 69.7 kg (153 lb 11.2 oz).         She reports that she has never smoked. She has never been exposed to tobacco smoke. She has never used smokeless tobacco.      Vasile White MD  [facility]  "

## 2023-08-14 NOTE — PROGRESS NOTES
SUBJECTIVE:   CC: Meaghan is an 38 year old who presents for preventive health visit.       8/14/2023    12:40 PM   Additional Questions   Roomed by Shavon ANAYA   Accompanied by n/a       Healthy Habits:     Taking medications regularly:  0  History of Present Illness       Reason for visit:  Right knee pain    She eats 0-1 servings of fruits and vegetables daily.She consumes 1 sweetened beverage(s) daily.She exercises with enough effort to increase her heart rate 9 or less minutes per day.  She exercises with enough effort to increase her heart rate 3 or less days per week.   She is taking medications regularly.          {Add if <65 person on Medicare  - Required Questions (Optional):452159}  {Outside tests to abstract? :934885}    {additional problems to add (Optional):411428}  Have you ever done Advance Care Planning? (For example, a Health Directive, POLST, or a discussion with a medical provider or your loved ones about your wishes): { :351154}    Social History     Tobacco Use    Smoking status: Never     Passive exposure: Never    Smokeless tobacco: Never   Substance Use Topics    Alcohol use: Not on file     {Rooming staff  Click this link to complete the Prescreen if response below is not for today's visit  Alcohol Use Prescreen >3 drinks/day or > 7 drinks/week.  If the prescreen question answer is YES, complete the full AUDIT  :691703}         No data to display            {add AUDIT responses (Optional) (A score of 7 for adult men is an indication of hazardous drinking; a score of 8 or more is an indication of an alcohol use disorder.  A score of 7 or more for adult women is an indication of hazardous drinking or an alchohol use disorder):011226}  Reviewed orders with patient.  Reviewed health maintenance and updated orders accordingly - { :068078}  {Chronicprobdata (optional):325785}    Breast Cancer Screening:    FHS-7:        No data to display              {If any of the questions to the BCRA  "(FHS-7) are answered yes, consider ordering referral for genetic counseling (Optional) :606043}  {AMB Mammogram Decision Support (Optional) :191737}  Pertinent mammograms are reviewed under the imaging tab.    History of abnormal Pap smear: { :213669}     Reviewed and updated as needed this visit by clinical staff                  Reviewed and updated as needed this visit by Provider                 {HISTORY OPTIONS (Optional):733591}    Review of Systems  {FEMALE ROS (Optional):178572}     OBJECTIVE:   There were no vitals taken for this visit.  Physical Exam  {Exam Choices (Optional):848215}    {Diagnostic Test Results (Optional):984453}    ASSESSMENT/PLAN:   {Diag Picklist:064156}    {Patient advised of split billing (Optional):945829}      COUNSELING:  {FEMALE COUNSELING MESSAGES:265765::\"Reviewed preventive health counseling, as reflected in patient instructions\"}      BMI:   Estimated body mass index is 29 kg/m  as calculated from the following:    Height as of 7/31/23: 1.557 m (5' 1.3\").    Weight as of 7/31/23: 70.3 kg (155 lb).   {Weight Management Plan needed for ACO:940969}      She reports that she has never smoked. She has never been exposed to tobacco smoke. She has never used smokeless tobacco.      {Counseling Resources  [place] Preventive Services Task Force  Cholesterol Screening  Health diet/nutrition  Pooled Cohorts Equation Calculator  [employer]'s MyPlate  ASA Prophylaxis  Lung CA Screening  Osteoporosis prevention/bone health :800688}  {Breast Cancer Risk Calculator  BRCA-Related Cancer Risk Assessment FHS-7 Tool :827714}  Vasile White MD  [facility]  "

## 2023-08-14 NOTE — PATIENT INSTRUCTIONS
You can call to schedule radiology tests at the following numbers:    RADIOLOGY SCHEDULING (Including Mammograms, Ultrasound, CT and MRI scans and Dexa Scans):  - [facility]: [phone]  - [facility]: [phone]  - [facility]: [phone]  - [facility]: [phone]        Preventive Health Recommendations  Female Ages 26 - 39  Yearly exam:   See your health care provider every year in order to  Review health changes.   Discuss preventive care.    Review your medicines if you your doctor has prescribed any.    Until age 30: Get a Pap test every three years (more often if you have had an abnormal result).    After age 30: Talk to your doctor about whether you should have a Pap test every 3 years or have a Pap test with HPV screening every 5 years.   You do not need a Pap test if your uterus was removed (hysterectomy) and you have not had cancer.  You should be tested each year for STDs (sexually transmitted diseases), if you're at risk.   Talk to your provider about how often to have your cholesterol checked.  If you are at risk for diabetes, you should have a diabetes test (fasting glucose).  Shots: Get a flu shot each year. Get a tetanus shot every 10 years.   Nutrition:   Eat at least 5 servings of fruits and vegetables each day.  Eat whole-grain bread, whole-wheat pasta and brown rice instead of white grains and rice.  Get adequate Calcium and Vitamin D.     Lifestyle  Exercise at least 150 minutes a week (30 minutes a day, 5 days of the week). This will help you control your weight and prevent disease.  Limit alcohol to one drink per day.  No smoking.   Wear sunscreen to prevent skin cancer.  See your dentist every six months for an exam and cleaning.

## 2023-08-14 NOTE — PROGRESS NOTES
{PROVIDER CHARTING PREFERENCE:747175}    Jaspal Harding is a 38 year old, presenting for the following health issues:  Musculoskeletal Problem      8/14/2023    12:40 PM   Additional Questions   Roomed by Shavon ANAYA   Accompanied by n/a       History of Present Illness       Reason for visit:  Right knee pain  Symptom onset:  More than a month  Symptom intensity:  Severe  Symptom progression:  Staying the same  Had these symptoms before:  No  Prior treatment description:  None  What makes it worse:  Moviming  What makes it better:  Medications ( Tylonel and Ibrofen )    She eats 0-1 servings of fruits and vegetables daily.She consumes 1 sweetened beverage(s) daily.She exercises with enough effort to increase her heart rate 9 or less minutes per day.  She exercises with enough effort to increase her heart rate 3 or less days per week.   She is taking medications regularly.       {SUPERLIST (Optional):962322}  {additonal problems for provider to add (Optional):042293}      Review of Systems   {ROS COMP (Optional):258251}      Objective    There were no vitals taken for this visit.  There is no height or weight on file to calculate BMI.  Physical Exam   {Exam List (Optional):994871}    {Diagnostic Test Results (Optional):055780}    {AMBULATORY ATTESTATION (Optional):125184}

## 2023-08-17 LAB
BKR LAB AP GYN ADEQUACY: NORMAL
BKR LAB AP GYN INTERPRETATION: NORMAL
BKR LAB AP HPV REFLEX: NORMAL
BKR LAB AP LMP: NORMAL
BKR LAB AP PREVIOUS ABNORMAL: NORMAL
PATH REPORT.COMMENTS IMP SPEC: NORMAL
PATH REPORT.COMMENTS IMP SPEC: NORMAL
PATH REPORT.RELEVANT HX SPEC: NORMAL

## 2023-08-21 LAB
HUMAN PAPILLOMA VIRUS 16 DNA: NEGATIVE
HUMAN PAPILLOMA VIRUS 18 DNA: NEGATIVE
HUMAN PAPILLOMA VIRUS FINAL DIAGNOSIS: NORMAL
HUMAN PAPILLOMA VIRUS OTHER HR: NEGATIVE

## 2023-09-15 ENCOUNTER — APPOINTMENT (OUTPATIENT)
Dept: INTERPRETER SERVICES | Facility: CLINIC | Age: 38
End: 2023-09-15
Payer: COMMERCIAL

## 2023-09-21 ENCOUNTER — LAB (OUTPATIENT)
Dept: LAB | Facility: CLINIC | Age: 38
End: 2023-09-21
Payer: COMMERCIAL

## 2023-09-21 DIAGNOSIS — Z00.00 ROUTINE GENERAL MEDICAL EXAMINATION AT A HEALTH CARE FACILITY: ICD-10-CM

## 2023-09-21 DIAGNOSIS — Z31.41 FERTILITY TESTING: ICD-10-CM

## 2023-09-21 DIAGNOSIS — Z11.59 NEED FOR HEPATITIS C SCREENING TEST: ICD-10-CM

## 2023-09-21 DIAGNOSIS — Z11.4 SCREENING FOR HIV (HUMAN IMMUNODEFICIENCY VIRUS): ICD-10-CM

## 2023-09-21 LAB
CHOLEST SERPL-MCNC: 150 MG/DL
FSH SERPL IRP2-ACNC: 11.4 MIU/ML
HDLC SERPL-MCNC: 48 MG/DL
LDLC SERPL CALC-MCNC: 86 MG/DL
LH SERPL-ACNC: 7.1 MIU/ML
MIS SERPL-MCNC: 0.11 NG/ML (ref 0.15–7.5)
NONHDLC SERPL-MCNC: 102 MG/DL
TRIGL SERPL-MCNC: 80 MG/DL

## 2023-09-21 PROCEDURE — 83002 ASSAY OF GONADOTROPIN (LH): CPT

## 2023-09-21 PROCEDURE — 83001 ASSAY OF GONADOTROPIN (FSH): CPT

## 2023-09-21 PROCEDURE — 86803 HEPATITIS C AB TEST: CPT

## 2023-09-21 PROCEDURE — 87389 HIV-1 AG W/HIV-1&-2 AB AG IA: CPT

## 2023-09-21 PROCEDURE — 83520 IMMUNOASSAY QUANT NOS NONAB: CPT

## 2023-09-21 PROCEDURE — 36415 COLL VENOUS BLD VENIPUNCTURE: CPT

## 2023-09-21 PROCEDURE — 80061 LIPID PANEL: CPT

## 2023-09-22 ENCOUNTER — TELEPHONE (OUTPATIENT)
Dept: FAMILY MEDICINE | Facility: CLINIC | Age: 38
End: 2023-09-22
Payer: COMMERCIAL

## 2023-09-22 LAB
HCV AB SERPL QL IA: NONREACTIVE
HIV 1+2 AB+HIV1 P24 AG SERPL QL IA: NONREACTIVE

## 2023-09-22 NOTE — TELEPHONE ENCOUNTER
Left message for patient to call [facility] back  When patient calls back please transfer to an RN  Tahmina CONTEH RN

## 2023-09-22 NOTE — RESULT ENCOUNTER NOTE
Please call.   Dear Meaghan  Your labs results revealed a low ovarian reserve. This means that you might have difficulty conceiving naturally. Your egg quality is good but your egg count is low.     Your cholesterol test was within normal limits except for a mild increase in your HDL. Increasing your physical activity will increase your HDL.     MD Christopher

## 2023-09-22 NOTE — TELEPHONE ENCOUNTER
Results relayed to patient  results via .   Pt will call to make appointments to follow up   Thanks,   Marcus Emery RN  [facility]

## 2023-09-22 NOTE — TELEPHONE ENCOUNTER
----- Message from Vasile White MD sent at 9/22/2023  1:36 PM CDT -----  Please call.   Dear Meaghan  Your labs results revealed a low ovarian reserve. This means that you might have difficulty conceiving naturally. Your egg quality is good but your egg count is low.     Your cholesterol test was within normal limits except for a mild increase in your HDL. Increasing your physical activity will increase your HDL.     MD Christopher

## 2023-10-10 ENCOUNTER — ANCILLARY PROCEDURE (OUTPATIENT)
Dept: MRI IMAGING | Facility: CLINIC | Age: 38
End: 2023-10-10
Attending: FAMILY MEDICINE
Payer: COMMERCIAL

## 2023-10-10 DIAGNOSIS — G89.29 CHRONIC PAIN OF RIGHT KNEE: ICD-10-CM

## 2023-10-10 DIAGNOSIS — M25.561 CHRONIC PAIN OF RIGHT KNEE: ICD-10-CM

## 2023-10-10 PROCEDURE — 73721 MRI JNT OF LWR EXTRE W/O DYE: CPT | Mod: RT | Performed by: RADIOLOGY

## 2023-10-17 ENCOUNTER — TELEPHONE (OUTPATIENT)
Dept: FAMILY MEDICINE | Facility: CLINIC | Age: 38
End: 2023-10-17
Payer: COMMERCIAL

## 2023-10-17 NOTE — TELEPHONE ENCOUNTER
FS,  Patient calling with [facility]  to follow-up on MRI results.  Wondering what next steps are.  Please advise.  Tahmina CONTEH RN

## 2023-10-18 NOTE — TELEPHONE ENCOUNTER
MRI results were discussed with the patient. Patient reports improvement in symptoms.  Patient desires to monitor symptoms and return to clinic on November 10 for follow-up visit.   MD Christopher

## 2023-10-19 NOTE — TELEPHONE ENCOUNTER
Patient called with [facility]   Has additional questions for PCP regarding MRI results  Offered to send questions to PCP, back if office tomorrow  Patient requesting to only speak with PCP directly  Scheduled for TE Monday, first available  Larisa MUNIZ RN

## 2023-10-23 ENCOUNTER — VIRTUAL VISIT (OUTPATIENT)
Dept: FAMILY MEDICINE | Facility: CLINIC | Age: 38
End: 2023-10-23
Payer: COMMERCIAL

## 2023-10-23 DIAGNOSIS — M25.561 CHRONIC PAIN OF RIGHT KNEE: ICD-10-CM

## 2023-10-23 DIAGNOSIS — S89.91XA ACUTE INJURY OF RIGHT KNEE CARTILAGE, INITIAL ENCOUNTER: Primary | ICD-10-CM

## 2023-10-23 DIAGNOSIS — G89.29 CHRONIC PAIN OF RIGHT KNEE: ICD-10-CM

## 2023-10-23 PROCEDURE — 99442 PR PHYSICIAN TELEPHONE EVALUATION 11-20 MIN: CPT | Mod: 95 | Performed by: FAMILY MEDICINE

## 2023-10-23 RX ORDER — CHOLECALCIFEROL (VITAMIN D3) 50 MCG
1 TABLET ORAL DAILY
Qty: 90 TABLET | Refills: 0 | Status: SHIPPED | OUTPATIENT
Start: 2023-10-23 | End: 2024-01-29

## 2023-10-23 RX ORDER — DICLOFENAC SODIUM 75 MG/1
75 TABLET, DELAYED RELEASE ORAL DAILY
Qty: 30 TABLET | Refills: 0 | Status: SHIPPED | OUTPATIENT
Start: 2023-10-23 | End: 2024-01-29

## 2023-10-23 NOTE — PROGRESS NOTES
Meaghan is a 38 year old who is being evaluated via a billable telephone visit.      What phone number would you like to be contacted at? [phone]  How would you like to obtain your AVS? Mail a copy  Distant Location (provider location):  On-site    Assessment & Plan     Chronic pain of right knee  - diclofenac (VOLTAREN) 75 MG EC tablet; Take 1 tablet (75 mg) by mouth daily    Acute injury of right knee cartilage, initial encounter    MRI results discussed with patient over the phone.  Patient reports improvement in her symptoms.  Advised her to schedule an appointment with Ortho if symptoms fail to improve.  - Orthopedic  Referral; Future  - vitamin D3 (CHOLECALCIFEROL) 50 mcg (2000 units) tablet; Take 1 tablet (50 mcg) by mouth daily for 90 days    Vasile White MD  [facility]   Meaghan is a 38 year old, presenting for the following health issues:  No chief complaint on file.      10/23/2023     4:35 PM   Additional Questions   Roomed by pamela CARROLL   38-year-old who scheduled a virtual visit to discuss recent MRI results.  At her last visit she reported right knee pain.  In today's visit patient reports significant improvement in her symptoms.  She had joint instability but it improved.            Review of Systems   Constitutional, HEENT, cardiovascular, pulmonary, gi and gu systems are negative, except as otherwise noted.      Objective    Vitals - Patient Reported  Weight (Patient Reported): 69.4 kg (153 lb)        Physical Exam   healthy, alert, and no distress  PSYCH: Alert and oriented times 3; coherent speech, normal   rate and volume, able to articulate logical thoughts, able   to abstract reason, no tangential thoughts, no hallucinations   or delusions  Her affect is normal  RESP: No cough, no audible wheezing, able to talk in full sentences  Remainder of exam unable to be completed due to telephone visits    MR KNEE RIGHT W/O CONTRAST  10/10/2023    Narrative  EXAMINATION: MRI of the right knee without contrast    DATE:  10/10/2023    HISTORY: Knee pain    TECHNIQUE: Multiplanar multisequence MR imaging of the right knee was  obtained using standard sequences in 3 orthogonal planes without the  use of intravenous or intra-articular gadolinium contrast.    Comparison: Comparison radiographs dated 7/31/2023 were reviewed,  which demonstrated no acute bone abnormality.    FINDINGS:    In the medial compartment, the medial meniscus is intact. There is no  high-grade or full-thickness cartilage loss or subchondral edema.    In the lateral compartment, mild increased signal in the anterior horn  of the lateral meniscus without discrete tear. There is no high-grade  or full-thickness cartilage loss or subchondral edema.    In the patellofemoral compartment, there are areas of grade 2/3  cartilage fissuring along the medial and lateral patellar facet  centered at the median ridge without subchondral edema.    The anterior and posterior cruciate ligaments are intact.    The tibial collateral ligament is intact.    The anterior iliotibial band, fibular collateral ligament, biceps  femoris tendon, and popliteus tendons are intact.    There is a small joint effusion. No popliteal cyst. No joint bodies.    The extensor mechanism is intact and normal in appearance.    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    Impression  IMPRESSION:  1. 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  2. Area of grade 2/3 cartilage fissuring along the medial and lateral  patellar facet with cartilage thinning centered at the median ridge.  3. The anterior and posterior cruciate ligaments, medial and lateral  supporting structures, and bilateral menisci are intact.  4. Mild increased signal in the anterior horn of the lateral  meniscus  without discrete tear.    VALENTINA LUGO MD      SYSTEM ID:  [id number]          Phone call duration: 17 minutes

## 2023-10-30 ENCOUNTER — TELEPHONE (OUTPATIENT)
Dept: FAMILY MEDICINE | Facility: CLINIC | Age: 38
End: 2023-10-30
Payer: COMMERCIAL

## 2023-10-30 ENCOUNTER — APPOINTMENT (OUTPATIENT)
Dept: INTERPRETER SERVICES | Facility: CLINIC | Age: 38
End: 2023-10-30
Payer: COMMERCIAL

## 2023-10-30 DIAGNOSIS — S89.91XA ACUTE INJURY OF RIGHT KNEE CARTILAGE, INITIAL ENCOUNTER: Primary | ICD-10-CM

## 2023-10-30 NOTE — TELEPHONE ENCOUNTER
Order/Referral Request    Who is requesting: Patient through     Orders being requested: Ortho ref to [facility] in [place]    Reason service is needed/diagnosis: Knee pain    When are orders needed by: ASAP    Has this been discussed with Provider: No    Does patient have a preference on a Group/Provider/Facility? [facility] [place]     Does patient have an appointment scheduled?: No    Where to send orders: Place orders within Epic    Okay to leave a detailed message?: Yes at Home number on file [phone] ([place])

## 2023-11-01 NOTE — TELEPHONE ENCOUNTER
DIAGNOSIS: Acute injury of right knee cartilage, referring provider Vasile White MD, MRI 10/10/23    APPOINTMENT DATE: 11/13/23   NOTES STATUS DETAILS   OFFICE NOTE from referring provider Internal 10/30/23, 10/23/23, 10/17/23, 08/14/23, 07/31/23 - Vasile White MD   MEDICATION LIST Internal    MRI Internal MR R KNEE:  - 10/10/23   XRAYS  & INJECTIONS (IMAGES & REPORTS) Internal XR R KNEE:  - 07/31/23

## 2023-11-13 ENCOUNTER — PRE VISIT (OUTPATIENT)
Dept: ORTHOPEDICS | Facility: CLINIC | Age: 38
End: 2023-11-13

## 2023-11-20 DIAGNOSIS — S89.91XA ACUTE INJURY OF RIGHT KNEE CARTILAGE, INITIAL ENCOUNTER: ICD-10-CM

## 2023-11-20 RX ORDER — CHOLECALCIFEROL (VITAMIN D3) 50 MCG
1 TABLET ORAL DAILY
Qty: 90 TABLET | Refills: 0 | OUTPATIENT
Start: 2023-11-20

## 2024-01-09 ENCOUNTER — HOSPITAL ENCOUNTER (EMERGENCY)
Facility: CLINIC | Age: 39
Discharge: HOME | End: 2024-01-09
Attending: EMERGENCY MEDICINE | Admitting: EMERGENCY MEDICINE
Payer: COMMERCIAL

## 2024-01-09 VITALS
OXYGEN SATURATION: 99 % | HEART RATE: 89 BPM | SYSTOLIC BLOOD PRESSURE: 119 MMHG | DIASTOLIC BLOOD PRESSURE: 66 MMHG | TEMPERATURE: 97.3 F | RESPIRATION RATE: 18 BRPM

## 2024-01-09 DIAGNOSIS — J45.901 MILD ASTHMA WITH EXACERBATION, UNSPECIFIED WHETHER PERSISTENT: ICD-10-CM

## 2024-01-09 LAB
FLUAV RNA SPEC QL NAA+PROBE: NEGATIVE
FLUBV RNA RESP QL NAA+PROBE: NEGATIVE
RSV RNA SPEC NAA+PROBE: NEGATIVE
SARS-COV-2 RNA RESP QL NAA+PROBE: POSITIVE

## 2024-01-09 PROCEDURE — 250N000012 HC RX MED GY IP 250 OP 636 PS 637: Performed by: EMERGENCY MEDICINE

## 2024-01-09 PROCEDURE — 99284 EMERGENCY DEPT VISIT MOD MDM: CPT | Mod: 25

## 2024-01-09 PROCEDURE — 87637 SARSCOV2&INF A&B&RSV AMP PRB: CPT | Performed by: EMERGENCY MEDICINE

## 2024-01-09 PROCEDURE — 94640 AIRWAY INHALATION TREATMENT: CPT

## 2024-01-09 PROCEDURE — 250N000013 HC RX MED GY IP 250 OP 250 PS 637: Performed by: EMERGENCY MEDICINE

## 2024-01-09 RX ORDER — PREDNISONE 20 MG/1
40 TABLET ORAL ONCE
Status: COMPLETED | OUTPATIENT
Start: 2024-01-09 | End: 2024-01-09

## 2024-01-09 RX ORDER — ALBUTEROL SULFATE 90 UG/1
2 AEROSOL, METERED RESPIRATORY (INHALATION) EVERY 6 HOURS PRN
Qty: 18 G | Refills: 0 | Status: SHIPPED | OUTPATIENT
Start: 2024-01-09 | End: 2024-03-09

## 2024-01-09 RX ORDER — ALBUTEROL SULFATE 90 UG/1
6 AEROSOL, METERED RESPIRATORY (INHALATION) ONCE
Status: COMPLETED | OUTPATIENT
Start: 2024-01-09 | End: 2024-01-09

## 2024-01-09 RX ORDER — BENZONATATE 100 MG/1
100 CAPSULE ORAL 3 TIMES DAILY PRN
Qty: 15 CAPSULE | Refills: 0 | Status: SHIPPED | OUTPATIENT
Start: 2024-01-09 | End: 2024-03-09

## 2024-01-09 RX ORDER — PREDNISONE 20 MG/1
40 TABLET ORAL DAILY
Qty: 8 TABLET | Refills: 0 | Status: SHIPPED | OUTPATIENT
Start: 2024-01-09 | End: 2024-01-13

## 2024-01-09 RX ORDER — IBUPROFEN 600 MG/1
600 TABLET, FILM COATED ORAL ONCE
Status: COMPLETED | OUTPATIENT
Start: 2024-01-09 | End: 2024-01-09

## 2024-01-09 RX ORDER — PREDNISONE 20 MG/1
20 TABLET ORAL ONCE
Status: DISCONTINUED | OUTPATIENT
Start: 2024-01-09 | End: 2024-01-09

## 2024-01-09 RX ADMIN — PREDNISONE 40 MG: 20 TABLET ORAL at 01:17

## 2024-01-09 RX ADMIN — IBUPROFEN 600 MG: 600 TABLET, FILM COATED ORAL at 01:18

## 2024-01-09 RX ADMIN — ALBUTEROL SULFATE 6 PUFF: 90 AEROSOL, METERED RESPIRATORY (INHALATION) at 01:18

## 2024-01-09 ASSESSMENT — ACTIVITIES OF DAILY LIVING (ADL): ADLS_ACUITY_SCORE: 35

## 2024-01-09 NOTE — ED TRIAGE NOTES
Cough x 2 days. In triage, respirations are even and unlabored. Sats 98% on room air. Able to speak in full sentences.

## 2024-01-09 NOTE — ED PROVIDER NOTES
"  History     Chief Complaint:  Cough       HPI   Kayla Howell is a 39 year old female with known history of asthma and diabetes presenting with cough.  Patient reports for the past 4 days having a dry cough with accompanying slight rhinorrhea and occasional abdominal frontal headache but denies any current headache at bedside.  She reports getting into \"coughing fits\" creating difficulty breathing.  She has not taken anything for analgesia other than utilizing cough drops.  She denies any fever, chills, sore throat, chest pain, abdominal pain, nausea, vomiting, diarrhea.  No known sick contacts.  No smoking history, recent antibiotics or recent hospitalizations. No chance of pregnancy.      Independent Historian:   None - Patient Only    Review of External Notes:    12/20/23 office visit      Medications:    albuterol (PROAIR HFA/PROVENTIL HFA/VENTOLIN HFA) 108 (90 Base) MCG/ACT inhaler  benzocaine (ANBESOL) 10 % gel  doxycycline hyclate (VIBRA-TABS) 100 MG tablet  ferrous sulfate (SLO-FE) 142 (45 FE) MG TBCR  fluticasone (FLONASE) 50 MCG/ACT nasal spray  LEVOTHYROXINE SODIUM PO  meclizine (ANTIVERT) 25 MG tablet  methylPREDNISolone (MEDROL DOSEPAK) 4 MG tablet therapy pack  OMEPRAZOLE PO  ondansetron (ZOFRAN ODT) 4 MG ODT tab  predniSONE (DELTASONE) 20 MG tablet  PRENATAL VITAMINS PO        Past Medical History:    Past Medical History:   Diagnosis Date    Diabetes (H)     Thyroid disease     Uncomplicated asthma        Past Surgical History:    No past surgical history on file.     Physical Exam   Patient Vitals for the past 24 hrs:   BP Temp Temp src Pulse Resp SpO2   01/09/24 0045 125/87 97.3  F (36.3  C) Temporal 91 16 99 %        Physical Exam  Nursing note and vitals reviewed.  Constitutional: Well nourished.   Eyes: Conjunctiva normal.  Pupils are equal, round, and reactive to light.   ENT: Nose normal. Mucous membranes pink and moist.  TM normal. No posterior oropharyngeal erythema/exudate. Uvula " midline.   Neck: Normal range of motion.  CVS: Normal rate, regular rhythm.  Normal heart sounds.    Pulmonary: Lungs with diffuse expiratory wheezing  GI: Abdomen soft. Nontender, nondistended. No rigidity or guarding.    MSK: No calf tenderness or swelling.  Neuro: Alert. Follows simple commands.  Skin: Skin is warm and dry. No rash noted.   Psychiatric: Normal affect.       Emergency Department Course       Imaging:  No orders to display          Laboratory:  Labs Ordered and Resulted from Time of ED Arrival to Time of ED Departure   INFLUENZA A/B, RSV, & SARS-COV2 PCR - Abnormal       Result Value    Influenza A PCR Negative      Influenza B PCR Negative      RSV PCR Negative      SARS CoV2 PCR Positive (*)         Emergency Department Course & Assessments:    Interventions:  Medications   albuterol (PROVENTIL HFA/VENTOLIN HFA) inhaler (6 puffs Inhalation $Given 1/9/24 0118)   ibuprofen (ADVIL/MOTRIN) tablet 600 mg (600 mg Oral $Given 1/9/24 0118)   predniSONE (DELTASONE) tablet 40 mg (40 mg Oral $Given 1/9/24 0117)        Independent Interpretation (X-rays, CTs, rhythm strip):  None    Consultations/Discussion of Management or Tests:  None        Social Determinants of Health affecting care:   None    Disposition:  The patient was discharged to home.     Impression & Plan        Medical Decision Making:  Patient is a 39-year-old female presenting with predominantly complaints of cough.  She has audible wheezing on arrival though is in no significant respiratory distress.  She was given albuterol as well as prednisone and reported significant symptom improvement.  She also noted headache prior though she is without focal neurodeficits or meningeal signs.  I do not feel advanced neuroimaging is warranted.  Moreover I do not feel blood work is warranted at this point in time.  She was tested for COVID-19/influenza/RSV in light of her symptoms the result pending at discharge.  On reevaluation after albuterol, lungs  clear, no significant work of breathing to necessitate chest x-ray as I clinically doubt pneumonia.  Patient feels comfortable deferring.  She denies any chest pain or other worrisome symptoms.  I did discuss with patient stronger suspicion for more mild asthma exacerbation at this point in time likely precipitated by a viral etiology.  I will initiate albuterol on discharge in addition to short prednisone course.  Monitor for lethargy, increased work of breathing, protracted vomiting or should symptoms worsen or change to probably represent.  Patient feels comfortable with plan of care, close PCP follow-up encouraged.      Diagnosis:    ICD-10-CM    1. Mild asthma with exacerbation, unspecified whether persistent  J45.901            Discharge Medications:  Discharge Medication List as of 1/9/2024  1:58 AM        START taking these medications    Details   !! albuterol (PROAIR HFA/PROVENTIL HFA/VENTOLIN HFA) 108 (90 Base) MCG/ACT inhaler Inhale 2 puffs into the lungs every 6 hours as needed for shortness of breath, wheezing or cough, Disp-18 g, R-0, E-PrescribePharmacy may dispense brand covered by insurance (Proair, or proventil or ventolin or generic albuterol inhaler)      benzonatate (TESSALON) 100 MG capsule Take 1 capsule (100 mg) by mouth 3 times daily as needed for cough, Disp-15 capsule, R-0, E-Prescribe      !! predniSONE (DELTASONE) 20 MG tablet Take 2 tablets (40 mg) by mouth daily for 4 days, Disp-8 tablet, R-0, E-Prescribe       !! - Potential duplicate medications found. Please discuss with provider.           1/9/2024   Dunia Balbuena, Dunia Vickers,   01/09/24 9829

## 2024-01-10 ENCOUNTER — TELEPHONE (OUTPATIENT)
Dept: PEDIATRICS | Facility: CLINIC | Age: 39
End: 2024-01-10
Payer: COMMERCIAL

## 2024-01-10 NOTE — TELEPHONE ENCOUNTER
Pt called stating she is returning a call from the clinic  Writer is unable to find any documentation    Writer did advise to pt that she is positive for Covid  Pt verbalized understanding     Aracely Long RN on 1/10/2024 at 10:08 AM

## 2024-01-17 ENCOUNTER — HOSPITAL ENCOUNTER (EMERGENCY)
Facility: CLINIC | Age: 39
Discharge: HOME | End: 2024-01-17
Attending: EMERGENCY MEDICINE | Admitting: EMERGENCY MEDICINE
Payer: COMMERCIAL

## 2024-01-17 VITALS
HEIGHT: 65 IN | WEIGHT: 206 LBS | HEART RATE: 92 BPM | TEMPERATURE: 98.9 F | DIASTOLIC BLOOD PRESSURE: 65 MMHG | OXYGEN SATURATION: 100 % | RESPIRATION RATE: 18 BRPM | BODY MASS INDEX: 34.32 KG/M2 | SYSTOLIC BLOOD PRESSURE: 121 MMHG

## 2024-01-17 DIAGNOSIS — N64.4 BREAST PAIN: ICD-10-CM

## 2024-01-17 DIAGNOSIS — N61.1 BREAST ABSCESS: ICD-10-CM

## 2024-01-17 PROCEDURE — 99283 EMERGENCY DEPT VISIT LOW MDM: CPT

## 2024-01-17 NOTE — ED PROVIDER NOTES
"  History     Chief Complaint:  Breast Pain       HPI   Kayla Howell is a 39 year old female with history of asthma and hypothyroidism who presents to the ED for evaluation of breast pain. She reports having \"little\" left breast pain for the past day. She also reports having a mild fever. She never had this before. Denies breastfeeding or chills.    Independent Historian:    None - Patient only    Review of External Notes:  N/A    Medications:    Albuterol inhaler   benzonatate (TESSALON) 100 MG capsule  doxycycline hyclate (VIBRA-TABS) 100 MG tablet  ferrous sulfate (SLO-FE) 142 (45 FE) MG TBCR  LEVOTHYROXINE SODIUM PO  meclizine (ANTIVERT) 25 MG tablet  methylPREDNISolone (MEDROL DOSEPAK) 4 MG tablet therapy pack  OMEPRAZOLE PO  ondansetron (ZOFRAN ODT) 4 MG ODT tab  predniSONE (DELTASONE) 20 MG tablet  PRENATAL VITAMINS PO    Past Medical History:    Gestational diabetes  Hypothyroidism   Asthma  Vitamin D deficiency    GERD    Physical Exam   Patient Vitals for the past 24 hrs:   BP Temp Temp src Pulse Resp SpO2 Height Weight   01/17/24 1713 121/65 98.9  F (37.2  C) Oral 92 18 100 % 1.651 m (5' 5\") 93.4 kg (206 lb)      Physical Exam  GENERAL: well developed, pleasant  HEAD: atraumatic  EYES: pupils reactive, extraocular muscles intact, conjunctivae normal  ENT:  mucus membranes moist  NECK:  trachea midline, normal range of motion  RESPIRATORY: no tachypnea, breath sounds clear to auscultation   CVS: normal S1/S2, no murmurs, intact distal pulses  ABDOMEN: soft, nontender, nondistention  MUSCULOSKELETAL: no deformities. At 6 o clock, between area of the breast and bone, walnut size mass with surrounding mass and arhythmia   SKIN: warm and dry, no acute rashes or ulceration  NEURO: GCS 15, cranial nerves intact, alert and oriented x3  PSYCH:  Mood/affect normal    Emergency Department Course     Procedures   None    Emergency Department Course & Assessments:       Interventions:  Medications - No data to " display     Independent Interpretation (X-rays, CTs, rhythm strip):  None    Assessments/Consultations/Discussion of Management or Tests:  ED Course as of 01/17/24 1850 Wed Jan 17, 2024   1725 I obtained history and examined the patient as noted above.    1734 I obtained history and examined the patient as noted above.      Social Determinants of Health affecting care:  None     Disposition:  The patient was discharged to home.     Impression & Plan      Medical Decision Making:    Patient presents with left breast pain that started today.  She is not breast-feeding.  There is a nodular structure at 6:00 at the border of the areola in the breast that is warm and erythematous.  It is not draining.  There are some mild surrounding erythema.  Certainly cyst versus abscess versus underlying malignancy are all considered.  Given the warmth and sudden onset we will give a course of antibiotics and referral.  Discussed this with her and did discuss with her couple of times as English is not her first language.  She indicated that she understood.    Diagnosis:    ICD-10-CM    1. Breast pain  N64.4       2. Breast abscess  N61.1            Discharge Medications:  Discharge Medication List as of 1/17/2024  5:39 PM        START taking these medications    Details   amoxicillin-clavulanate (AUGMENTIN) 875-125 MG tablet Take 1 tablet by mouth 2 times daily for 10 days, Disp-20 tablet, R-0, E-Prescribe            Scribe Disclosure:  I, Rebecca Howell, am serving as a scribe at 5:31 PM on 1/17/2024 to document services personally performed by José Luis Campos MD based on my observations and the provider's statements to me.  1/17/2024   José Luis Campos MD Adams, Shaun L, MD  01/17/24 1851

## 2024-01-17 NOTE — ED TRIAGE NOTES
Pt here for breast pain that started yesterday afternoon. Pt also states she feels a small mass in her L breast. Pt also endorses dizziness and headaches.

## 2024-01-29 ENCOUNTER — OFFICE VISIT (OUTPATIENT)
Dept: FAMILY MEDICINE | Facility: CLINIC | Age: 39
End: 2024-01-29
Payer: COMMERCIAL

## 2024-01-29 VITALS
HEIGHT: 60 IN | RESPIRATION RATE: 14 BRPM | WEIGHT: 154 LBS | OXYGEN SATURATION: 96 % | TEMPERATURE: 97.7 F | SYSTOLIC BLOOD PRESSURE: 100 MMHG | BODY MASS INDEX: 30.23 KG/M2 | DIASTOLIC BLOOD PRESSURE: 65 MMHG | HEART RATE: 67 BPM

## 2024-01-29 DIAGNOSIS — M25.561 CHRONIC PAIN OF RIGHT KNEE: ICD-10-CM

## 2024-01-29 DIAGNOSIS — M94.9 CARTILAGE DISEASE: ICD-10-CM

## 2024-01-29 DIAGNOSIS — G89.29 CHRONIC PAIN OF RIGHT KNEE: ICD-10-CM

## 2024-01-29 DIAGNOSIS — S89.91XA ACUTE INJURY OF RIGHT KNEE CARTILAGE, INITIAL ENCOUNTER: Primary | ICD-10-CM

## 2024-01-29 PROCEDURE — 99214 OFFICE O/P EST MOD 30 MIN: CPT | Performed by: FAMILY MEDICINE

## 2024-01-29 RX ORDER — DICLOFENAC SODIUM 75 MG/1
75 TABLET, DELAYED RELEASE ORAL DAILY
Qty: 30 TABLET | Refills: 0 | Status: SHIPPED | OUTPATIENT
Start: 2024-01-29

## 2024-01-29 RX ORDER — CHOLECALCIFEROL (VITAMIN D3) 50 MCG
1 TABLET ORAL DAILY
Qty: 90 TABLET | Refills: 0 | Status: SHIPPED | OUTPATIENT
Start: 2024-01-29 | End: 2024-05-07

## 2024-01-29 ASSESSMENT — PAIN SCALES - PAIN ENJOYMENT GENERAL ACTIVITY SCALE (PEG)
PEG_TOTALSCORE: 3.33
INTERFERED_ENJOYMENT_LIFE: 0
AVG_PAIN_PASTWEEK: 8
AVG_PAIN_PASTWEEK: 8
INTERFERED_GENERAL_ACTIVITY: 2
INTERFERED_ENJOYMENT_LIFE: 0 - DOES NOT INTERFERE
PEG_TOTALSCORE: 3.33
INTERFERED_GENERAL_ACTIVITY: 2

## 2024-01-29 ASSESSMENT — PAIN SCALES - GENERAL: PAINLEVEL: SEVERE PAIN (7)

## 2024-01-29 NOTE — LETTER
January 29, 2024      Meaghan Garcia  [address]   [facility] [address]        Dear ,    We are writing to inform you of your test results.    If you have any questions or concerns, please call the clinic at the number listed above.       Sincerely,  Vasile White MD

## 2024-01-29 NOTE — PROGRESS NOTES
Assessment & Plan       ICD-10-CM    1. Acute injury of right knee cartilage, initial encounter  S89.91XA vitamin D3 (CHOLECALCIFEROL) 50 mcg (2000 units) tablet      2. Cartilage disease  M94.9 Physical Therapy Referral      3. Chronic pain of right knee  M25.561 diclofenac (VOLTAREN) 75 MG EC tablet    G89.29         Multiple abnormalities documented in patient's recent MRI.  Advised her to schedule previously recommended physical therapy and schedule a follow-up appointment with sports/orthopedicsAlison Harding is a 39 year old, presenting for the following health issues:  Knee Pain        1/29/2024     1:03 PM   Additional Questions   Roomed by Feliberto LAURA     History of Present Illness       Reason for visit:  Knee Pain  Symptom onset:  More than a month  Symptom intensity:  Severe  Symptom progression:  Staying the same  Had these symptoms before:  No  Has tried/received treatment for these symptoms:  No  What makes it worse:  House work, activity  What makes it better:  Rest, knee brace, pain killers    She eats 0-1 servings of fruits and vegetables daily.She consumes 0 sweetened beverage(s) daily.She exercises with enough effort to increase her heart rate 9 or less minutes per day.  She exercises with enough effort to increase her heart rate 3 or less days per week.   She is taking medications regularly.     FDLMP: 01/12/2024 12/04/2023    Review of Systems  Constitutional, HEENT, cardiovascular, pulmonary, gi and gu systems are negative, except as otherwise noted.      Objective    /65   Pulse 67   Temp 97.7  F (36.5  C) (Temporal)   Resp 14   Ht 1.524 m (5')   Wt 69.9 kg (154 lb)   LMP 01/17/2024 (Within Weeks)   SpO2 96%   BMI 30.08 kg/m    Body mass index is 30.08 kg/m .  Physical Exam   GENERAL: alert and no distress  NECK: no adenopathy, no asymmetry, masses, or scars  RESP: lungs clear to auscultation - no rales, rhonchi or wheezes  CV: regular rate and rhythm, normal S1 S2,  no S3 or S4, no murmur, click or rub, no peripheral edema  ABDOMEN: soft, nontender, no hepatosplenomegaly, no masses and bowel sounds normal  MS: Trace edema in right knee.  Range of motion restricted by pain.    MR KNEE RIGHT W/O CONTRAST 10/10/2023    Narrative  EXAMINATION: MRI of the right knee without contrast    DATE:  10/10/2023    HISTORY: Knee pain    TECHNIQUE: Multiplanar multisequence MR imaging of the right knee was  obtained using standard sequences in 3 orthogonal planes without the  use of intravenous or intra-articular gadolinium contrast.    Comparison: Comparison radiographs dated 7/31/2023 were reviewed,  which demonstrated no acute bone abnormality.    FINDINGS:    In the medial compartment, the medial meniscus is intact. There is no  high-grade or full-thickness cartilage loss or subchondral edema.    In the lateral compartment, mild increased signal in the anterior horn  of the lateral meniscus without discrete tear. There is no high-grade  or full-thickness cartilage loss or subchondral edema.    In the patellofemoral compartment, there are areas of grade 2/3  cartilage fissuring along the medial and lateral patellar facet  centered at the median ridge without subchondral edema.    The anterior and posterior cruciate ligaments are intact.    The tibial collateral ligament is intact.    The anterior iliotibial band, fibular collateral ligament, biceps  femoris tendon, and popliteus tendons are intact.    There is a small joint effusion. No popliteal cyst. No joint bodies.    The extensor mechanism is intact and normal in appearance.    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    Impression  IMPRESSION:  1. 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  2. Area of grade 2/3 cartilage fissuring along the medial and lateral  patellar facet with  cartilage thinning centered at the median ridge.  3. The anterior and posterior cruciate ligaments, medial and lateral  supporting structures, and bilateral menisci are intact.  4. Mild increased signal in the anterior horn of the lateral meniscus  without discrete tear.    VALENTINA LUGO MD      SYSTEM ID:  [id number]          Signed Electronically by: Vasile White MD

## 2024-02-05 ENCOUNTER — APPOINTMENT (OUTPATIENT)
Dept: INTERPRETER SERVICES | Facility: CLINIC | Age: 39
End: 2024-02-05
Payer: COMMERCIAL

## 2024-03-07 ENCOUNTER — APPOINTMENT (OUTPATIENT)
Dept: INTERPRETER SERVICES | Facility: CLINIC | Age: 39
End: 2024-03-07
Payer: COMMERCIAL

## 2024-03-09 ENCOUNTER — APPOINTMENT (OUTPATIENT)
Dept: GENERAL RADIOLOGY | Facility: CLINIC | Age: 39
End: 2024-03-09
Attending: PHYSICIAN ASSISTANT

## 2024-03-09 ENCOUNTER — HOSPITAL ENCOUNTER (EMERGENCY)
Facility: CLINIC | Age: 39
Discharge: HOME | End: 2024-03-09
Attending: PHYSICIAN ASSISTANT | Admitting: PHYSICIAN ASSISTANT
Payer: COMMERCIAL

## 2024-03-09 VITALS
RESPIRATION RATE: 16 BRPM | HEART RATE: 73 BPM | DIASTOLIC BLOOD PRESSURE: 62 MMHG | TEMPERATURE: 98 F | WEIGHT: 200 LBS | BODY MASS INDEX: 33.32 KG/M2 | HEIGHT: 65 IN | OXYGEN SATURATION: 97 % | SYSTOLIC BLOOD PRESSURE: 112 MMHG

## 2024-03-09 DIAGNOSIS — J06.9 URI (UPPER RESPIRATORY INFECTION): ICD-10-CM

## 2024-03-09 DIAGNOSIS — J45.901 ASTHMA EXACERBATION: ICD-10-CM

## 2024-03-09 LAB
FLUAV RNA SPEC QL NAA+PROBE: NEGATIVE
FLUBV RNA RESP QL NAA+PROBE: NEGATIVE
RSV RNA SPEC NAA+PROBE: NEGATIVE
SARS-COV-2 RNA RESP QL NAA+PROBE: NEGATIVE

## 2024-03-09 PROCEDURE — 87637 SARSCOV2&INF A&B&RSV AMP PRB: CPT | Performed by: PHYSICIAN ASSISTANT

## 2024-03-09 PROCEDURE — 99284 EMERGENCY DEPT VISIT MOD MDM: CPT | Mod: 25

## 2024-03-09 PROCEDURE — 71046 X-RAY EXAM CHEST 2 VIEWS: CPT

## 2024-03-09 RX ORDER — PREDNISONE 20 MG/1
TABLET ORAL
Qty: 10 TABLET | Refills: 0 | Status: SHIPPED | OUTPATIENT
Start: 2024-03-09

## 2024-03-09 RX ORDER — ALBUTEROL SULFATE 90 UG/1
2 AEROSOL, METERED RESPIRATORY (INHALATION) EVERY 6 HOURS PRN
Qty: 18 G | Refills: 0 | Status: SHIPPED | OUTPATIENT
Start: 2024-03-09

## 2024-03-09 RX ORDER — BENZONATATE 100 MG/1
100 CAPSULE ORAL 3 TIMES DAILY PRN
Qty: 12 CAPSULE | Refills: 0 | Status: SHIPPED | OUTPATIENT
Start: 2024-03-09

## 2024-03-09 ASSESSMENT — COLUMBIA-SUICIDE SEVERITY RATING SCALE - C-SSRS
1. IN THE PAST MONTH, HAVE YOU WISHED YOU WERE DEAD OR WISHED YOU COULD GO TO SLEEP AND NOT WAKE UP?: NO
6. HAVE YOU EVER DONE ANYTHING, STARTED TO DO ANYTHING, OR PREPARED TO DO ANYTHING TO END YOUR LIFE?: NO
2. HAVE YOU ACTUALLY HAD ANY THOUGHTS OF KILLING YOURSELF IN THE PAST MONTH?: NO

## 2024-03-09 ASSESSMENT — ACTIVITIES OF DAILY LIVING (ADL)
ADLS_ACUITY_SCORE: 35
ADLS_ACUITY_SCORE: 35

## 2024-03-09 NOTE — ED TRIAGE NOTES
Pt presents to ED with shortness of breath and cough. Cough started this morning. States she tried honey without relief. No fevers. Cough is nonproductive. Denies known sick contacts.

## 2024-03-09 NOTE — ED PROVIDER NOTES
"    History     Chief Complaint:  Cough     The history is provided by the patient.      Kayla Howell is a 39 year old female with history of asthma who presents to the ED for evaluation of a cough. The patient reports that today she developed a cough, nasal congestion, and lung congestion, as well as some difficulty breathing. Patient states that she has some chest pain when she coughs too much. Denies fever or sore throat. Notes history of asthma, which she has an inhaler for.    Independent Historian:    None     Review of External Notes:  None    Medications:    Albuterol  Tessalon  Vibra  Synthroid   Antivert   Medrol  Prilosec   Zofran  Deltasone  Vistaril   Diflucan   Voltaren   Senna  Ortho-Cyclen      Past Medical History:    Thyroid disease  Asthma   Obesity  Learning difficulty   Gestational diabetes   Adiposity   Vitamin D deficiency   Reactive airway disease   GERD  Hypothyroidism     Past Surgical History:    The patient has no pertinent past surgical history.     Physical Exam   Patient Vitals for the past 24 hrs:   BP Temp Pulse Resp SpO2 Height Weight   03/09/24 1616 -- -- -- -- -- 1.651 m (5' 5\") 90.7 kg (200 lb)   03/09/24 1554 116/83 98  F (36.7  C) 74 18 97 % -- --      Physical Exam  Constitutional: Pleasant. Cooperative.   Eyes: Pupils equally round and reactive  HENT: Head is normal in appearance. Oropharynx is normal with moist mucus membranes.  Cardiovascular: Regular rate and rhythm and without murmurs.  Respiratory: Normal respiratory effort. Wheezing noted. No crackles noted.  Musculoskeletal: No asymmetry of the lower extremities.  Skin: Normal, without rash.  Neurologic: Cranial nerves grossly intact, normal cognition, no focal deficits. Alert and oriented x 3.   Psychiatric: Normal affect.  Nursing notes and vital signs reviewed.    Emergency Department Course   Imaging:  Chest XR,  PA & LAT   Final Result   IMPRESSION: Negative chest.        Laboratory:  Labs Ordered and Resulted " from Time of ED Arrival to Time of ED Departure   INFLUENZA A/B, RSV, & SARS-COV2 PCR - Normal       Result Value    Influenza A PCR Negative      Influenza B PCR Negative      RSV PCR Negative      SARS CoV2 PCR Negative        Emergency Department Course & Assessments:  Interventions:  Medications - No data to display     Independent Interpretation (X-rays, CTs, rhythm strip):  I independently reviewed the chest XR, no obvious PNA noted.     Assessments/Consultations/Discussion of Management or Tests:  ED Course as of 03/09/24 1820   Sat Mar 09, 2024   1702 I obtained history and examined the patient as noted above.    1819 I rechecked and updated the patient.      Social Determinants of Health affecting care:  None     Disposition:  The patient was discharged.    Impression & Plan    Medical Decision Making:  Kayla Howell is a 39 year old female with a history of asthma who presents to ED for evaluation of cough, congestion.  See HPI as above for additional details.  Vitals and physical exam as above.  Differential was broad and included viral URI, COVID, influenza, RSV, bacterial pneumonia, amongst others.  Chest x-ray negative for acute infiltrate.  COVID, influenza, and RSV swabs returned negative.  Suspect viral URI.  Patient does have evidence for wheezing.  Will start patient on burst of prednisone and update her albuterol inhaler as well.  Tessalon for symptomatic management.  Advised importance of hydration.  [place] patient was safe for discharge to home with close outpatient follow-up with persistent symptoms for recheck in the next few days. Discussed reasons to return. All questions answered. Patient discharged to home in stable condition.    Diagnosis:    ICD-10-CM    1. URI (upper respiratory infection)  J06.9       2. Asthma exacerbation  J45.901          Discharge Medications:  New Prescriptions    ALBUTEROL (PROAIR HFA/PROVENTIL HFA/VENTOLIN HFA) 108 (90 BASE) MCG/ACT INHALER    Inhale 2 puffs  into the lungs every 6 hours as needed for shortness of breath, wheezing or cough    BENZONATATE (TESSALON) 100 MG CAPSULE    Take 1 capsule (100 mg) by mouth 3 times daily as needed for cough    PREDNISONE (DELTASONE) 20 MG TABLET    Take two tablets (= 40mg) each day for 5 (five) days      Scribe Disclosure:  BREONNA PALOMO, am serving as a scribe at 4:18 PM on 3/9/2024 to document services personally performed by Donnell Alexander PA-C based on my observations and the provider's statements to me.    3/9/2024   Donnell Alexander PA-C     This record was created at least in part using electronic voice recognition software, so please excuse any typographical errors.       Donnell Alexander PA-C  03/09/24 8675

## 2024-03-10 NOTE — DISCHARGE INSTRUCTIONS
The chest x-ray returned without evidence for pneumonia. Your COVID, influenza, and RSV swab returns negative. I suspect a viral upper respiratory infection.  Use Tessalon to help decrease cough frequency.  Use albuterol and prednisone as prescribed to help with wheezing.

## 2024-03-13 ENCOUNTER — THERAPY VISIT (OUTPATIENT)
Dept: PHYSICAL THERAPY | Facility: CLINIC | Age: 39
End: 2024-03-13
Attending: FAMILY MEDICINE
Payer: COMMERCIAL

## 2024-03-13 DIAGNOSIS — M94.9 CARTILAGE DISEASE: ICD-10-CM

## 2024-03-13 PROCEDURE — 97161 PT EVAL LOW COMPLEX 20 MIN: CPT | Mod: GP

## 2024-03-13 PROCEDURE — 97530 THERAPEUTIC ACTIVITIES: CPT | Mod: GP

## 2024-03-13 PROCEDURE — 97110 THERAPEUTIC EXERCISES: CPT | Mod: GP

## 2024-03-13 NOTE — PROGRESS NOTES
PHYSICAL THERAPY EVALUATION  Type of Visit: Evaluation    See electronic medical record for Abuse and Falls Screening details.    R knee pain that started without any specific injury. Began hurting in Feb 2023. Activities that cause the most pain are standing, walking and transitioning from sitting to standing. Sometimes will go for a walk, about 30min which is when pain will set in. MRI showed small lateral meniscus tear, xray was negative.     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  Hobbies/Interests:      Patient goals for therapy: have less pain    Pain assessment: See objective evaluation for additional pain details     Objective   KNEE EVALUATION  PAIN: Pain Location: knee  INTEGUMENTARY (edema, incisions): WNL  POSTURE: WNL  GAIT:  Gait Deviations: Antalgic  Stance time decreased    ROM:   (Degrees) Left AROM Left PROM  Right AROM Right PROM   Knee Flexion full  Full, no pain    Knee Extension 0  0    Pain:   End feel:     STRENGTH:   Pain: - none + mild ++ moderate +++ severe  Strength Scale: 0-5/5 Left Right   Knee Flexion 4+ 4+   Knee Extension 4+ 4+   Quad Set  good     FLEXIBILITY: WNL  FUNCTIONAL TESTS: Double Leg Squat: Painful  PALPATION:   + Tenderness At Location Left Right   Medial Joint Line  -   Lateral Joint Line  +   Patellar Tendon  -   Popliteal  -   Patellar Medial  -   Patellar Lateral  -   Patellar Superior  -   Patellar Inferior   -     JOINT MOBILITY:    Left Right   Patellofemoral Medial   Hypomobile   Patellofemoral Lateral  Hypomobile   Patellofemoral Superior  Hypomobile   Patellofemoral Inferior  Hypomobile            Assessment & Plan   CLINICAL IMPRESSIONS  Medical Diagnosis: Cartilage disease    Treatment Diagnosis: R knee pain   Impression/Assessment: Patient is a 39 year old female with R knee complaints.  The following significant findings have been identified: Pain, Decreased joint mobility, Decreased strength, Impaired gait, Impaired muscle performance, and Decreased activity tolerance. These impairments interfere with their ability to perform self care tasks, work tasks, household chores, and community mobility as compared to previous level of function.     Clinical Decision Making (Complexity):  Clinical Presentation: Stable/Uncomplicated  Clinical Presentation Rationale: based on medical and personal factors listed in PT evaluation  Clinical Decision Making (Complexity): Low complexity    PLAN OF CARE  Treatment Interventions:  Modalities: Cryotherapy, Dry Needling, E-stim, Hot Pack, Vasoneumatic Device  Interventions: Gait Training, Manual Therapy, Neuromuscular Re-education, Therapeutic Activity, Therapeutic Exercise    Long Term Goals     PT Goal 1  Goal Identifier: HEP  Goal Description: Patient will be independent with HEP by discharge to improve status outside of therapy  Rationale: to maximize safety and independence with performance of ADLs and functional tasks  Target Date: 06/13/24  PT Goal 2  Goal Identifier: standing  Goal Description: patient will be able to stand for greater than 1 hour with R knee pain less than 2/10  Rationale: to maximize safety and independence with performance of ADLs and functional tasks;to maximize safety and independence within the home;to maximize safety and independence within the community  Target Date: 05/13/24      Frequency of Treatment: 1x every 2 weeks  Duration of Treatment: 3 months    Recommended Referrals to Other Professionals:   none  Education Assessment:   Learner/Method: Patient;Listening;Demonstration;Pictures/Video;No Barriers to Learning    Risks and benefits of evaluation/treatment have been explained.   Patient/Family/caregiver agrees with Plan of Care.     Evaluation Time:     PT Eval, Low Complexity Minutes (58506): 19     Signing Clinician: JULIA Cee [facility]                                                                                   OUTPATIENT PHYSICAL THERAPY      PLAN OF TREATMENT FOR OUTPATIENT REHABILITATION   Patient's Last Name, First Name, Meaghan Shay YOB: 1985   Provider's Name   [facility]   Medical Record No.  [id number]     Onset Date: 01/29/24 (order date)  Start of Care Date: 03/13/24     Medical Diagnosis:  Cartilage disease      PT Treatment Diagnosis:  R knee pain Plan of Treatment  Frequency/Duration: 1x every 2 weeks/ 3 months    Certification date from 03/13/24 to 06/10/24         See note for plan of treatment details and functional goals     Kajal Hatfield, PT                         I CERTIFY THE NEED FOR THESE SERVICES FURNISHED UNDER        THIS PLAN OF TREATMENT AND WHILE UNDER MY CARE     (Physician attestation of this document indicates review and certification of the therapy plan).              Referring Provider:  Vasile White    Initial Assessment  See Epic Evaluation- Start of Care Date: 03/13/24

## 2024-04-17 ENCOUNTER — THERAPY VISIT (OUTPATIENT)
Dept: PHYSICAL THERAPY | Facility: CLINIC | Age: 39
End: 2024-04-17
Payer: COMMERCIAL

## 2024-04-17 DIAGNOSIS — M94.9 CARTILAGE DISEASE: Primary | ICD-10-CM

## 2024-04-17 PROCEDURE — 97110 THERAPEUTIC EXERCISES: CPT | Mod: GP

## 2024-04-17 PROCEDURE — T1013 SIGN LANG/ORAL INTERPRETER: HCPCS | Mod: U4

## 2024-05-01 ENCOUNTER — THERAPY VISIT (OUTPATIENT)
Dept: PHYSICAL THERAPY | Facility: CLINIC | Age: 39
End: 2024-05-01
Payer: COMMERCIAL

## 2024-05-01 DIAGNOSIS — M94.9 CARTILAGE DISEASE: Primary | ICD-10-CM

## 2024-05-01 PROCEDURE — 97530 THERAPEUTIC ACTIVITIES: CPT | Mod: GP

## 2024-05-07 DIAGNOSIS — S89.91XA ACUTE INJURY OF RIGHT KNEE CARTILAGE, INITIAL ENCOUNTER: ICD-10-CM

## 2024-05-08 RX ORDER — CHOLECALCIFEROL (VITAMIN D3) 50 MCG
1 TABLET ORAL DAILY
Qty: 90 TABLET | Refills: 0 | Status: SHIPPED | OUTPATIENT
Start: 2024-05-08

## 2024-05-30 ENCOUNTER — THERAPY VISIT (OUTPATIENT)
Dept: PHYSICAL THERAPY | Facility: CLINIC | Age: 39
End: 2024-05-30
Payer: COMMERCIAL

## 2024-05-30 DIAGNOSIS — M94.9 CARTILAGE DISEASE: Primary | ICD-10-CM

## 2024-05-30 PROCEDURE — 97530 THERAPEUTIC ACTIVITIES: CPT | Mod: GP

## 2024-05-30 PROCEDURE — 97110 THERAPEUTIC EXERCISES: CPT | Mod: GP

## 2024-06-05 ENCOUNTER — OFFICE VISIT (OUTPATIENT)
Dept: FAMILY MEDICINE | Facility: CLINIC | Age: 39
End: 2024-06-05
Payer: COMMERCIAL

## 2024-06-05 VITALS
DIASTOLIC BLOOD PRESSURE: 61 MMHG | BODY MASS INDEX: 29.84 KG/M2 | HEART RATE: 67 BPM | TEMPERATURE: 97.8 F | RESPIRATION RATE: 16 BRPM | OXYGEN SATURATION: 97 % | SYSTOLIC BLOOD PRESSURE: 94 MMHG | HEIGHT: 60 IN | WEIGHT: 152 LBS

## 2024-06-05 DIAGNOSIS — M94.9 CARTILAGE DISEASE: Primary | ICD-10-CM

## 2024-06-05 DIAGNOSIS — R53.83 OTHER FATIGUE: ICD-10-CM

## 2024-06-05 DIAGNOSIS — Z01.84 IMMUNITY STATUS TESTING: ICD-10-CM

## 2024-06-05 LAB
ERYTHROCYTE [DISTWIDTH] IN BLOOD BY AUTOMATED COUNT: 12.2 % (ref 10–15)
HBA1C MFR BLD: 5.1 % (ref 0–5.6)
HCT VFR BLD AUTO: 39.7 % (ref 35–47)
HGB BLD-MCNC: 13.6 G/DL (ref 11.7–15.7)
MCH RBC QN AUTO: 31.7 PG (ref 26.5–33)
MCHC RBC AUTO-ENTMCNC: 34.3 G/DL (ref 31.5–36.5)
MCV RBC AUTO: 93 FL (ref 78–100)
PLATELET # BLD AUTO: 243 10E3/UL (ref 150–450)
RBC # BLD AUTO: 4.29 10E6/UL (ref 3.8–5.2)
WBC # BLD AUTO: 3.9 10E3/UL (ref 4–11)

## 2024-06-05 PROCEDURE — 99214 OFFICE O/P EST MOD 30 MIN: CPT | Performed by: FAMILY MEDICINE

## 2024-06-05 PROCEDURE — 80053 COMPREHEN METABOLIC PANEL: CPT | Performed by: FAMILY MEDICINE

## 2024-06-05 PROCEDURE — 84443 ASSAY THYROID STIM HORMONE: CPT | Performed by: FAMILY MEDICINE

## 2024-06-05 PROCEDURE — 86706 HEP B SURFACE ANTIBODY: CPT | Performed by: FAMILY MEDICINE

## 2024-06-05 PROCEDURE — 83036 HEMOGLOBIN GLYCOSYLATED A1C: CPT | Performed by: FAMILY MEDICINE

## 2024-06-05 PROCEDURE — 82728 ASSAY OF FERRITIN: CPT | Performed by: FAMILY MEDICINE

## 2024-06-05 PROCEDURE — 36415 COLL VENOUS BLD VENIPUNCTURE: CPT | Performed by: FAMILY MEDICINE

## 2024-06-05 PROCEDURE — 82306 VITAMIN D 25 HYDROXY: CPT | Performed by: FAMILY MEDICINE

## 2024-06-05 PROCEDURE — 85027 COMPLETE CBC AUTOMATED: CPT | Performed by: FAMILY MEDICINE

## 2024-06-05 ASSESSMENT — PAIN SCALES - GENERAL: PAINLEVEL: EXTREME PAIN (8)

## 2024-06-05 NOTE — PROGRESS NOTES
"Assessment & Plan     Meaghan was seen today for knee pain.    Diagnoses and all orders for this visit:    Cartilage disease  Physical therapy notes and MRI results were reviewed.  Patient was referred to Ortho for definitive treatment.  Arthroscopy versus intra-articular injections.  Advised her to continue with over-the-counter medications for symptomatic relief.    -     Orthopedic  Referral; Future    Other fatigue  -     Vitamin D Deficiency; Future  -     Ferritin; Future  -     CBC with platelets; Future  -     Comprehensive metabolic panel (BMP + Alb, Alk Phos, ALT, AST, Total. Bili, TP); Future  -     Hemoglobin A1c; Future  -     TSH with free T4 reflex; Future    Immunity status testing  -     Hepatitis B Surface Antibody; Future              Subjective   Meaghan is a 39 year old, presenting for the following health issues:  Knee Pain (Right knee pain since February 2023 had PT)    HPI   39-year-old who presents to the clinic for a follow-up visit.  Patient presented to the clinic on October 2023 with a chief complaint of persistent right knee pain.  She underwent an x-ray and an MRI which revealed small right knee fluid effusion and grade 2-3 cartilage fissuring along the medial and lateral patellar facet.  She was seen and evaluated by sports medicine and was referred to physical therapy.  Patient reports persistent pain and minimal improvement in her pain with physical therapy.    Also, reports fatigue and lack of energy to do things. Desires to get her blood work completed today.       Review of Systems  Constitutional, neuro, ENT, endocrine, pulmonary, cardiac, gastrointestinal, genitourinary, musculoskeletal, integument and psychiatric systems are negative, except as otherwise noted.      Objective    Ht 1.518 m (4' 11.75\")   Wt 68.9 kg (152 lb)   LMP 05/22/2024 (Approximate)   BMI 29.93 kg/m    Body mass index is 29.93 kg/m .  Physical Exam   GENERAL: alert and no distress  RESP: " lungs clear to auscultation - no rales, rhonchi or wheezes  CV: regular rate and rhythm, normal S1 S2, no S3 or S4, no murmur, click or rub, no peripheral edema  MS: RLE exam shows normal strength and muscle mass, no deformities, no erythema, induration, or nodules, and limited range of motion due to pain.      MR Knee Right w/o Contrast 10/10/2023    Narrative  EXAMINATION: MRI of the right knee without contrast    DATE:  10/10/2023    HISTORY: Knee pain    TECHNIQUE: Multiplanar multisequence MR imaging of the right knee was  obtained using standard sequences in 3 orthogonal planes without the  use of intravenous or intra-articular gadolinium contrast.    Comparison: Comparison radiographs dated 7/31/2023 were reviewed,  which demonstrated no acute bone abnormality.    FINDINGS:    In the medial compartment, the medial meniscus is intact. There is no  high-grade or full-thickness cartilage loss or subchondral edema.    In the lateral compartment, mild increased signal in the anterior horn  of the lateral meniscus without discrete tear. There is no high-grade  or full-thickness cartilage loss or subchondral edema.    In the patellofemoral compartment, there are areas of grade 2/3  cartilage fissuring along the medial and lateral patellar facet  centered at the median ridge without subchondral edema.    The anterior and posterior cruciate ligaments are intact.    The tibial collateral ligament is intact.    The anterior iliotibial band, fibular collateral ligament, biceps  femoris tendon, and popliteus tendons are intact.    There is a small joint effusion. No popliteal cyst. No joint bodies.    The extensor mechanism is intact and normal in appearance.    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    Impression  IMPRESSION:  1. 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  2. Area of grade 2/3 cartilage fissuring along the medial and lateral  patellar facet with cartilage thinning centered at the median ridge.  3. The anterior and posterior cruciate ligaments, medial and lateral  supporting structures, and bilateral menisci are intact.  4. Mild increased signal in the anterior horn of the lateral meniscus  without discrete tear.    VALENTINA LUGO MD      SYSTEM ID:  [id number]          Signed Electronically by: Vasile White MD

## 2024-06-06 LAB
ALBUMIN SERPL BCG-MCNC: 4.2 G/DL (ref 3.5–5.2)
ALP SERPL-CCNC: 57 U/L (ref 40–150)
ALT SERPL W P-5'-P-CCNC: 13 U/L (ref 0–50)
ANION GAP SERPL CALCULATED.3IONS-SCNC: 9 MMOL/L (ref 7–15)
AST SERPL W P-5'-P-CCNC: 16 U/L (ref 0–45)
BILIRUB SERPL-MCNC: 0.4 MG/DL
BUN SERPL-MCNC: 7.9 MG/DL (ref 6–20)
CALCIUM SERPL-MCNC: 9.1 MG/DL (ref 8.6–10)
CHLORIDE SERPL-SCNC: 105 MMOL/L (ref 98–107)
CREAT SERPL-MCNC: 0.54 MG/DL (ref 0.51–0.95)
DEPRECATED HCO3 PLAS-SCNC: 23 MMOL/L (ref 22–29)
EGFRCR SERPLBLD CKD-EPI 2021: >90 ML/MIN/1.73M2
FERRITIN SERPL-MCNC: 51 NG/ML (ref 6–175)
GLUCOSE SERPL-MCNC: 91 MG/DL (ref 70–99)
HBV SURFACE AB SERPL IA-ACNC: <3.5 M[IU]/ML
HBV SURFACE AB SERPL IA-ACNC: NONREACTIVE M[IU]/ML
POTASSIUM SERPL-SCNC: 3.6 MMOL/L (ref 3.4–5.3)
PROT SERPL-MCNC: 7 G/DL (ref 6.4–8.3)
SODIUM SERPL-SCNC: 137 MMOL/L (ref 135–145)
TSH SERPL DL<=0.005 MIU/L-ACNC: 2.82 UIU/ML (ref 0.3–4.2)
VIT D+METAB SERPL-MCNC: 33 NG/ML (ref 20–50)

## 2024-07-10 ENCOUNTER — TELEPHONE (OUTPATIENT)
Dept: FAMILY MEDICINE | Facility: CLINIC | Age: 39
End: 2024-07-10
Payer: COMMERCIAL

## 2024-07-10 NOTE — TELEPHONE ENCOUNTER
Order/Referral Request    Who is requesting: pt    Orders being requested: nerves referral    Reason service is needed/diagnosis: numbness for a year    When are orders needed by: asap    Has this been discussed with Provider: No    Does patient have a preference on a Group/Provider/Facility? [facility]    Does patient have an appointment scheduled?: No    Where to send orders: Place orders within Epic    Okay to leave a detailed message?: Yes at Cell number on file:    Telephone Information:   Mobile [phone]

## 2024-07-15 ENCOUNTER — PATIENT OUTREACH (OUTPATIENT)
Dept: CARE COORDINATION | Facility: CLINIC | Age: 39
End: 2024-07-15
Payer: COMMERCIAL

## 2024-07-15 NOTE — TELEPHONE ENCOUNTER
Attempt #2 to reach patient.]    Left message for patient to call [facility] back  When patient calls back please transfer to an RN  Tahmina CONTEH RN

## 2024-07-29 ENCOUNTER — PATIENT OUTREACH (OUTPATIENT)
Dept: CARE COORDINATION | Facility: CLINIC | Age: 39
End: 2024-07-29
Payer: COMMERCIAL

## 2024-08-07 ENCOUNTER — TELEPHONE (OUTPATIENT)
Dept: FAMILY MEDICINE | Facility: CLINIC | Age: 39
End: 2024-08-07
Payer: COMMERCIAL

## 2024-08-07 ENCOUNTER — APPOINTMENT (OUTPATIENT)
Dept: INTERPRETER SERVICES | Facility: CLINIC | Age: 39
End: 2024-08-07
Payer: COMMERCIAL

## 2024-08-07 NOTE — TELEPHONE ENCOUNTER
All lab results are normal except for lack of immunity against hepatitis B.    I would recommend scheduling a nurse only visit for immunizations. We have Engerix-B at our clinic and it is a three dose series given at 0, 1, 6 months.    MD Christopher

## 2024-08-07 NOTE — TELEPHONE ENCOUNTER
Returned call with [facility]    Reviewed below results  Patient verbalized understanding and agreed with plan of care  Patient will call back to schedule vaccines or obtain at next OV 09/2024  Thanks,  Larisa MUNIZ RN

## 2024-08-07 NOTE — TELEPHONE ENCOUNTER
FS,  Please see below message and advise.  Patient requesting review of recent labs  Thanks,  Larisa MUNIZ RN

## 2024-08-07 NOTE — TELEPHONE ENCOUNTER
Test Results        Who ordered the test:  Christopher    Type of test: Labs    Date of test:  6-5-24    Where was the test performed:  [place]    What are your questions/concerns?:  results    Okay to leave a detailed message?: Yes at Home number on file [phone] (home)

## 2024-09-11 ENCOUNTER — VIRTUAL VISIT (OUTPATIENT)
Dept: INTERPRETER SERVICES | Facility: CLINIC | Age: 39
End: 2024-09-11

## 2024-09-11 PROCEDURE — T1013 SIGN LANG/ORAL INTERPRETER: HCPCS | Mod: U4 | Performed by: FAMILY MEDICINE

## 2024-09-18 ENCOUNTER — APPOINTMENT (OUTPATIENT)
Dept: INTERPRETER SERVICES | Facility: CLINIC | Age: 39
End: 2024-09-18

## 2024-10-30 ENCOUNTER — OFFICE VISIT (OUTPATIENT)
Dept: FAMILY MEDICINE | Facility: CLINIC | Age: 39
End: 2024-10-30
Payer: COMMERCIAL

## 2024-10-30 VITALS
WEIGHT: 154.3 LBS | OXYGEN SATURATION: 95 % | HEIGHT: 60 IN | HEART RATE: 65 BPM | TEMPERATURE: 97.9 F | DIASTOLIC BLOOD PRESSURE: 64 MMHG | BODY MASS INDEX: 30.29 KG/M2 | SYSTOLIC BLOOD PRESSURE: 110 MMHG

## 2024-10-30 DIAGNOSIS — Z31.41 FERTILITY TESTING: ICD-10-CM

## 2024-10-30 DIAGNOSIS — M25.561 CHRONIC PAIN OF RIGHT KNEE: Primary | ICD-10-CM

## 2024-10-30 DIAGNOSIS — G89.29 CHRONIC PAIN OF RIGHT KNEE: Primary | ICD-10-CM

## 2024-10-30 DIAGNOSIS — R20.0 LEFT SIDED NUMBNESS: ICD-10-CM

## 2024-10-30 PROCEDURE — 90471 IMMUNIZATION ADMIN: CPT | Performed by: FAMILY MEDICINE

## 2024-10-30 PROCEDURE — 99214 OFFICE O/P EST MOD 30 MIN: CPT | Mod: 25 | Performed by: FAMILY MEDICINE

## 2024-10-30 PROCEDURE — 90746 HEPB VACCINE 3 DOSE ADULT IM: CPT | Performed by: FAMILY MEDICINE

## 2024-10-30 RX ORDER — DICLOFENAC SODIUM 75 MG/1
75 TABLET, DELAYED RELEASE ORAL DAILY
Qty: 30 TABLET | Refills: 0 | Status: SHIPPED | OUTPATIENT
Start: 2024-10-30

## 2024-10-30 RX ORDER — IBUPROFEN 200 MG
200 TABLET ORAL EVERY 4 HOURS PRN
COMMUNITY

## 2024-10-30 ASSESSMENT — ENCOUNTER SYMPTOMS: NUMBNESS: 1

## 2024-10-30 ASSESSMENT — PAIN SCALES - GENERAL: PAINLEVEL_OUTOF10: NO PAIN (0)

## 2024-10-30 NOTE — PROGRESS NOTES
"  Assessment & Plan     Meaghan was seen today for numbness.    Diagnoses and all orders for this visit:    Chronic pain of right knee  -     diclofenac (VOLTAREN) 75 MG EC tablet; Take 1 tablet (75 mg) by mouth daily.    Left sided numbness  -     Vitamin B12; Future  -     Lipid panel reflex to direct LDL Fasting; Future  -     TSH with free T4 reflex; Future  -     EMG; Future    Fertility testing  -     Anti-Mullerian hormone; Future  -     Follicle stimulating hormone; Future  -     Estradiol; Future    Other orders  -     PRIMARY CARE FOLLOW-UP SCHEDULING; Future  -     REVIEW OF HEALTH MAINTENANCE PROTOCOL ORDERS  -     HEPATITIS B, ADULT 20+ (ENGERIX-B/RECOMBIVAX HB)      Follow-up:     Follow-up Visit   Expected date:  Oct 30, 2025 (Approximate)      Follow Up Appointment Details:     Follow-up with whom?: PCP    Follow-Up for what?: Adult Preventive    How?: In Person                 BMI  Estimated body mass index is 30.39 kg/m  as calculated from the following:    Height as of this encounter: 1.518 m (4' 11.75\").    Weight as of this encounter: 70 kg (154 lb 4.8 oz).       Jaspal Harding is a 39 year old, presenting for the following health issues:  Numbness        10/30/2024     3:44 PM   Additional Questions   Roomed by NATI Gofftice   Accompanied by n/a     History of Present Illness       Reason for visit:  Numbness in body  Symptom onset:  More than a month (2023 it got worse)  Symptoms include:  Located in legs and hands, but especially on the left side of the body. Additionally has knee pain. The numbness comes and goes, but it gets worse when there is cold weather (Started when working in a cold work environment)  Symptom intensity:  Moderate  Symptom progression:  Worsening  Had these symptoms before:  No  What makes it worse:  Cold weather, sleeping  What makes it better:  Moving around/activities      Intermittent episodes of numbness, mostly on the left side.   Triggered by " cold     FDLMP: 10/21/2024  09/05/2024 and again 09/13/2024    Review of Systems  Constitutional, neuro, ENT, endocrine, pulmonary, cardiac, gastrointestinal, genitourinary, musculoskeletal, integument and psychiatric systems are negative, except as otherwise noted.      Objective    LMP 09/02/2024 (Exact Date)   There is no height or weight on file to calculate BMI.  Physical Exam               Signed Electronically by: Vasile White MD

## 2024-10-30 NOTE — NURSING NOTE
Pt received Hepatitis B vaccine per Dr. White's orders. Pt given VIS form prior to immunization administration.   Prior to immunization administration, verified patients identity using patient s name and date of birth.     Patient instructed to remain in clinic for 15 minutes afterwards, and to report any adverse reactions.     Performed by Twan Hartman RN on 10/30/2024.

## 2024-10-30 NOTE — PATIENT INSTRUCTIONS
"Patient Education   Talada Daryeelka Ka   Hortaga ah  Fabio waa talo guud oo aan inta badan bixino si aan dadka uga caawino inay caafimaad qabaan. Kooxdaada daryeelka ayaa laga yaabaa inay kuu hayaan talo kuu gaar ah. Fadlan miguel a hadal kooxdaada daryeelka baahiyahaaga daryeelka ee ka hortaga.  Hab Nololeedka  Samee jimicsi ugu yaraan 150 daqiiqo todobaad kasta (30 daqiiqo maalintii, 5 maalmood todobaadkii).  Samee hawlaha xoojiya murqaha 2 maalmood todobaadkii. Kuwani waxay kaa caawinayaan xakamaynta miisaankaaga iyo ka hortaga cudurada.  Lama ogola sigaar cabista  Xiro muraayadaha qorraxda celiya si aad uga hortagto kansarka maqaarka.  Gurigaaga ha laga nikunj gaaska radon 2 ilaa 5-tii sanaba mar. Radon waa gaas aan midab lahayn, oo aan ur lahayn oo wax yeeli codi sambabadaada. Si aad wax badan uga ogaato, aad [email or web address]. oo raadi \"Radon in Homes.\"  Hay qoryaha iyagoo aan rasaas ku jirin oo ku xir goob badqab leh sida khasnadda badqab leh ama isticmaal khaanada qoryaha, oo qari furayaasha. Markasta si gooni ah ugu quful khaanad rasaasta. Si aad wax badan uga ogaato, booqo dps.mn.gov oo raadi \"safe gun storage.\"  Nafaqada  Cun 5 cunto ama ka badan oo khudaar iyo mohamud ah maalin kasta.  Isku day rootiga qamadiga ka samaysan, bariiska buniga ah iyo baastada (badalkii rootiga cad, bariiska, iyo baasto).  Hel calcium iyo vitamin D kugu filan. Hubi calaamadda cuntooyinka oo ujeedo 100% ee RDA (kaalmada maalinlaha ah ee lagu talrominayay).  Baaritaano joogta ah  Samee baaritaanka ilkaha iyo nadiifinta 6 bilood kasta.  Arag kooxdaada daryeelka caafimaadka sanad kasta si aad ugala hadasho:  Isbadal kasta oo ku yimaadda caafimaadkaaga.  Daawooyin kasta oo kooxdaada daryeelka kuu qoreen.  Daryeelka ka hortagga, qorshaynta dhalmada qoyska, iyo siyaabaha looga hortago cudurada daba dheeraada.  Talaaaishwarya (talaacristopher)   Nas HPV (ilaa da'da 26), haddii aadan waligaa hore u qaadanin.  Nas andrade B (ilaa da'da " 59),haddi aanad hore u qaadanin.  araseli COVID-19: Qaado araseli marka ay dhamaato.  Araseli finchbkelly: qaado ivkkidavekelly benigno sannad kasta.  Araseli Rhodes: Qaado 10-ki sanaba mar.  Joseaastanley panukiitada, cagaarshawa A, iyo [place]a RSV: Weydii kooxdaada daryeelka haddii aad u baahan tahay kuwaas oo ku salaysan khatarta aad ku jirto.  Araseli barney (loogu talagaly da'da 50 iyo ka weyn).  [facility]ar[facility]ada [facility]  Baaritaanka sorowga:  Laga bilaabo da'da 35, Iska baar sokorowga ugu yaraan 3 sanaba mar.  Haddii aad ka felix tahay da'da 35, waydii kooxdaada daryeelka haddii ay tahay in [facility].  Baaritaanka Kolestoroolka: Da'da 39, bilow inaad iska baCaneadeao kolestaroolka 5 sanaba mar, ama marar badan haddii lagu taliyey.  Baaritaanka Cufnaanta Lafta (DEXA): Da'da 50,Waydii kooxdaada daryeelka haddii ay tahay in [facility] baaritaanka jilicnaanta lafaha).  Robina C: Iska baar ugu yaraan nora mar noloshaada.  Baaritaanka god ku yaala Xididka Dhiiga ka Qaada Wadnaha: Miguel A hadal dhakhtarkaaga baaritaankan haddii aad:  Weligaa Sigaar ma cabtay; oo  Aadiga ma lab tahay; oo  da'da u dhaxayso 67 iyo 77.  Cudurada galmada lagu miguel a qaado (STIs)  Kahor da'da 24:  Weydii kooxdaada daryeelka haddii ay tahay in [facility] baaro Cudurada galmada [facility] mig[facility] a qaado (STIs).  Kadib da'da 24: Iska baar Cudurada galmada lagu miguel a qaado (STIs) haddii aad halis ugu jirto. Aad halis ugu jirto [facility] Bil[facility] miguel a qaado (STIs) (oo ay ku jiraan HIV) haddii:  Hadii aad galmo la samaysay nora qof ka badan.  Aadan isticmaalin cinjirka galmada wakhti kasta.  Adiga ama lamaanahaaga laga helay cur [facility] miguel a qaado [facility].  Hadii aad halis ugu jirto HIV, wax ka waydii daawada PrEP si aad uga hortagto HIV.  Iska baar HIV ugu yaraan nora mar noloshaada, haddii aad halis ugu jirto HIV iyo haddii kale.  Baaritaanada Kansarka  Baaritaanada Kansarka ee Xubinta Taranka [facility]: Haddii aad dumar tahaynoni  si joogto ah u hesho baaritaanka [facility] qeybta hore ee ilmo xubinta taranka da'da 21. Inta badan dadka sameeya baaritaanada caadiga ah ee leh natiijooyinka caadiga ah waxay joogsan karaan da'da 65 kadib. Midan miguel a hadal [facility].  Baaritaanka [facility] naasaha (baaritaanka sawiritaanka ee [facility]): Haddii aad naaso leedahay, bilaw inaad ka baarto naasahaaga [facility] naasaha si joogto ahda'da 40. Kani waa baaritaan raajo oo lagu baaro [facility].  Baaritaanka [facility] xiid-maha waaweyn: Waa muhiim in la bilaabo baMiss[facility] Centerank[facility] waaweyn da'da 45.  Samee baaritaanka [facility] malawadka 10-ki sanaba mar (ama in ka badan haddii aad halis ugu jirto) [place], weydii bixiyahaaga baaritaanada saxarada sida imtixaanka FIT sanad walba ama baaritaanka Cologuard 3-dii sanaba mar.  Si aad wax badan uga barato ikhtiyafanny minayao: [email or web address].  Durgawimaada go'aan fanny gaoo: bit.ly/lc12781.  Baaritaanka [facility] kaadi mareenka raga: Hadii aad jayme tahay aad jirto da'da 55 ilaa 69, ka codso daryeel bixiyahaaga haddii aad ka faa'iidaysan lahayd baaritaanka [facility] kaadi mareenka ee sannadkiiba mar.  Baaritaanka [facility] Sambabada: Hadii aad hada sigaar cabto ama aad horaan u cabaysay oo aad jirto da'da 50 ilaa 80, ka codso kooxdaada daryeelka haddii baaritaanka [facility] Hospitala socda uu kugu habboon yahay.    Ujeeddooyin wargelin oo kaliya. Priti price inay beddel u noqoto talada emiliana. Hardikquuqda rhodacamallikaa   2023 [facility] Hospital[facility] milanquuqdu [facility] ruiz jewell. Waxaa caafimaad ahaan kael u eegay [facility] Sitestar [id number] - REV 04/24.

## 2024-11-11 ENCOUNTER — LAB (OUTPATIENT)
Dept: LAB | Facility: CLINIC | Age: 39
End: 2024-11-11
Attending: FAMILY MEDICINE
Payer: COMMERCIAL

## 2024-11-11 DIAGNOSIS — R20.0 LEFT SIDED NUMBNESS: ICD-10-CM

## 2024-11-11 DIAGNOSIS — Z31.41 FERTILITY TESTING: ICD-10-CM

## 2024-11-11 LAB
CHOLEST SERPL-MCNC: 165 MG/DL
ESTRADIOL SERPL-MCNC: 24 PG/ML
FASTING STATUS PATIENT QL REPORTED: NO
FSH SERPL IRP2-ACNC: 17.9 MIU/ML
HDLC SERPL-MCNC: 54 MG/DL
LDLC SERPL CALC-MCNC: 92 MG/DL
MIS SERPL-MCNC: 0.1 NG/ML (ref 0.15–7.5)
NONHDLC SERPL-MCNC: 111 MG/DL
TRIGL SERPL-MCNC: 93 MG/DL
TSH SERPL DL<=0.005 MIU/L-ACNC: 2.96 UIU/ML (ref 0.3–4.2)
VIT B12 SERPL-MCNC: 524 PG/ML (ref 232–1245)

## 2024-11-11 PROCEDURE — 82166 ASSAY ANTI-MULLERIAN HORM: CPT

## 2024-11-11 PROCEDURE — 84443 ASSAY THYROID STIM HORMONE: CPT

## 2024-11-11 PROCEDURE — 80061 LIPID PANEL: CPT

## 2024-11-11 PROCEDURE — 83001 ASSAY OF GONADOTROPIN (FSH): CPT

## 2024-11-11 PROCEDURE — 82607 VITAMIN B-12: CPT

## 2024-11-11 PROCEDURE — 36415 COLL VENOUS BLD VENIPUNCTURE: CPT

## 2024-11-11 PROCEDURE — 82670 ASSAY OF TOTAL ESTRADIOL: CPT

## 2024-11-14 NOTE — RESULT ENCOUNTER NOTE
Team please call  Dear Meaghan  Your labs results revealed a low ovarian reserve.  Your ovarian reserve decreased since last year. This means that you might have difficulty conceiving naturally. Your egg quality is good but your egg count is low.  All your other results are within normal limits    MD Christopher

## 2024-11-20 ENCOUNTER — VIRTUAL VISIT (OUTPATIENT)
Dept: FAMILY MEDICINE | Facility: CLINIC | Age: 39
End: 2024-11-20
Payer: COMMERCIAL

## 2024-11-20 DIAGNOSIS — N97.9 FEMALE INFERTILITY: ICD-10-CM

## 2024-11-20 DIAGNOSIS — E28.39 DIMINISHED OVARIAN RESERVE DUE TO LOW ANTRAL FOLLICLE: Primary | ICD-10-CM

## 2024-11-20 NOTE — PROGRESS NOTES
"Meaghan is a 39 year old who is being evaluated via a billable telephone visit.    What phone number would you like to be contacted at? [phone]  How would you like to obtain your AVS? Mail a copy  Originating Location (pt. Location): Home  Distant Location (provider location):  On-site    Assessment & Plan     Meaghan was seen today for results and fertility.    Diagnoses and all orders for this visit:  Female infertility  Diminished ovarian reserve due to low antral follicle  Patient scheduled a virtual visit to discuss recent abnormal fertility labs.  She will be getting  this week and desires to start trying immediately.  We had an extended discussion regarding her current labs.  She was interested in ovarian stimulation with Clomid or letrozole.  She was referred to our OB/GYN colleagues for a consultation.  Patient has a child with osteogenesis imperfecta.  Desires to proceed with preconceptional counseling.  She will contact us for genetic counseling after the marriage is finalized.  -     Ob/Gyn  Referral; Future  -     Progesterone; Future  -     US Pelvic Complete with Transvaginal; Future      Follow-up:     Follow-up Visit   Expected date:  Feb 21, 2025 (Approximate)      Follow Up Appointment Details:     Follow-up with whom?: Me    Follow-Up for what?: Chronic Disease f/u    Chronic Disease f/u: General (Other)    How?: In Person or Virtual                    BMI  Estimated body mass index is 30.39 kg/m  as calculated from the following:    Height as of 10/30/24: 1.518 m (4' 11.75\").    Weight as of 10/30/24: 70 kg (154 lb 4.8 oz).       Subjective   Meaghan is a 39 year old, presenting for the following health issues:  Results (Lab Results) and Fertility        10/30/2024     3:44 PM   Additional Questions   Roomed by NATI Goff   Accompanied by n/a     History of Present Illness       Reason for visit:  Would like to discuss fertility and labs that were taken last " week      Patient is G1, P1.  Has a child with osteogenesis imperfecta.  She has a new partner and desires to conceive.  Patient reports completing a genetic test previously but does not recall the details of the results.  In our previous visit patient stated that her genetic test was negative.   Advised patient to proceed with preconception genetic counseling.   She will contact us when the weddings finalized and she is ready to schedule an appointment.  Requests a referral to OB/GYN.    Review of Systems  Constitutional, neuro, ENT, endocrine, pulmonary, cardiac, gastrointestinal, genitourinary, musculoskeletal, integument and psychiatric systems are negative, except as otherwise noted.      Objective         Vitals:  No vitals were obtained today due to virtual visit.    Physical Exam   General: Alert and no distress //Respiratory: No audible wheeze, cough, or shortness of breath // Psychiatric:  Appropriate affect, tone, and pace of words      No results found for any visits on 11/20/24.      Phone call duration: 21 minutes  Signed Electronically by: Vasile White MD

## 2024-12-02 ENCOUNTER — TELEPHONE (OUTPATIENT)
Dept: FAMILY MEDICINE | Facility: CLINIC | Age: 39
End: 2024-12-02
Payer: COMMERCIAL

## 2024-12-02 NOTE — TELEPHONE ENCOUNTER
Called and spoke with Patient The Scheduled Appt on 1/15/25 is renae  [facility] Unit Coordinator

## 2024-12-02 NOTE — TELEPHONE ENCOUNTER
Reason for Call:  Appointment Request    Patient requesting this type of appt: Chronic Diease Management/Medication/Follow-Up    Requested provider: Vasile White    Reason patient unable to be scheduled: Not within requested timeframe    When does patient want to be seen/preferred time: Same day    Comments: PATIENT WOULD LIKE TO BE SEEN SOONER THAN JANUARY.    Okay to leave a detailed message?: Yes at Cell number on file:    Telephone Information:   Mobile [phone]       Call taken on 12/2/2024 at 11:59 AM by Dalia Painter

## 2024-12-03 ENCOUNTER — LAB (OUTPATIENT)
Dept: LAB | Facility: CLINIC | Age: 39
End: 2024-12-03
Payer: COMMERCIAL

## 2024-12-03 DIAGNOSIS — E28.39 DIMINISHED OVARIAN RESERVE DUE TO LOW ANTRAL FOLLICLE: ICD-10-CM

## 2024-12-03 PROCEDURE — 84144 ASSAY OF PROGESTERONE: CPT

## 2024-12-03 PROCEDURE — 36415 COLL VENOUS BLD VENIPUNCTURE: CPT

## 2024-12-04 LAB — PROGEST SERPL-MCNC: 5 NG/ML

## 2024-12-05 ENCOUNTER — VIRTUAL VISIT (OUTPATIENT)
Dept: FAMILY MEDICINE | Facility: CLINIC | Age: 39
End: 2024-12-05
Payer: COMMERCIAL

## 2024-12-05 DIAGNOSIS — R10.2 PELVIC PAIN IN FEMALE: Primary | ICD-10-CM

## 2024-12-05 DIAGNOSIS — N97.9 FEMALE INFERTILITY: ICD-10-CM

## 2024-12-05 DIAGNOSIS — E28.39 DIMINISHED OVARIAN RESERVE DUE TO LOW ANTRAL FOLLICLE: ICD-10-CM

## 2024-12-05 NOTE — RESULT ENCOUNTER NOTE
Results discussed with patient over the phone.  Progesterone level is very low likely anovulatory cycle.  MD Christopher

## 2024-12-05 NOTE — PROGRESS NOTES
"Meaghan is a 39 year old who is being evaluated via a billable telephone visit.    What phone number would you like to be contacted at? [phone]   How would you like to obtain your AVS? Diandraharsherice  Originating Location (pt. Location): Home    Distant Location (provider location):  On-site    Assessment & Plan   Meaghan was seen today for vaginal problem.    Diagnoses and all orders for this visit:    Pelvic pain in female    Diminished ovarian reserve due to low antral follicle    Female infertility    Pleasant 39-year-old G1, P1.  Patient scheduled a virtual visit to discuss recent labs.  Her medical history significant for low ovarian reserve and secondary infertility.  She has a child with osteogenesis imperfecta From a previous marriage.  Recently got  and desires to conceive immediately.  Previous labs were significant for low AMH.  She had a progesterone check on day 22 of her cycle and her level was low consistent with an in anovulatory cycle.  Patient is interested in ovarian stimulation with Clomid or letrozole.  She was referred to our OB/GYN colleagues for a consultation.  Pelvic pain with sexual intercourse.  Differentials includes uterine fibroids, atrophic vaginitis/vaginal dryness, uterine polyp, ovarian cysts.  Advised patient to proceed with previously ordered pelvic ultrasound    Follow-up:  Return if symptoms worsen or fail to improve.       BMI  Estimated body mass index is 30.39 kg/m  as calculated from the following:    Height as of 10/30/24: 1.518 m (4' 11.75\").    Weight as of 10/30/24: 70 kg (154 lb 4.8 oz).       Subjective   Meaghan is a 39 year old, presenting for the following health issues:  Vaginal Problem        12/5/2024     9:33 AM   Additional Questions   Roomed by Feliberto CARROLL   Patient is a G1, P1 pleasant female with past medical history of decreased ovarian reserve and anovulatory cycles.  She has a child with osteogenesis imperfecta from her previous marriage.  " Recently got  and desires to conceive immediately.  Patient reports completing the genetic test previously but does not recall the details of the results.  In our previous visit patient stated that her genetic test was negative.  Referral for preconception counseling was placed previously.  In today's visit she also reports pelvic pain with sexual intercourse.  Also reports a similar complaint in her previous marriage.  She denies any vaginal bleeding or discharge.    First date of her most recent menstrual cycle 11/30/2024        Review of Systems  Constitutional, neuro, ENT, endocrine, pulmonary, cardiac, gastrointestinal, genitourinary, musculoskeletal, integument and psychiatric systems are negative, except as otherwise noted.      Objective    Vitals - Patient Reported  Pain Score: No Pain (0)      Vitals:  No vitals were obtained today due to virtual visit.    Physical Exam   General: Alert and no distress //Respiratory: No audible wheeze, cough, or shortness of breath // Psychiatric:  Appropriate affect, tone, and pace of words      No results found for any visits on 12/05/24.      Phone call duration: 12 minutes  Signed Electronically by: Vasile White MD

## 2024-12-11 ENCOUNTER — OFFICE VISIT (OUTPATIENT)
Dept: OBGYN | Facility: CLINIC | Age: 39
End: 2024-12-11
Attending: FAMILY MEDICINE
Payer: COMMERCIAL

## 2024-12-11 ENCOUNTER — VIRTUAL VISIT (OUTPATIENT)
Dept: INTERPRETER SERVICES | Facility: CLINIC | Age: 39
End: 2024-12-11
Payer: COMMERCIAL

## 2024-12-11 ENCOUNTER — ANCILLARY PROCEDURE (OUTPATIENT)
Dept: ULTRASOUND IMAGING | Facility: CLINIC | Age: 39
End: 2024-12-11
Attending: FAMILY MEDICINE
Payer: COMMERCIAL

## 2024-12-11 VITALS — WEIGHT: 156 LBS | HEIGHT: 60 IN | BODY MASS INDEX: 30.63 KG/M2

## 2024-12-11 DIAGNOSIS — N97.9 FEMALE INFERTILITY: ICD-10-CM

## 2024-12-11 DIAGNOSIS — E28.39 DIMINISHED OVARIAN RESERVE DUE TO LOW ANTRAL FOLLICLE: ICD-10-CM

## 2024-12-11 DIAGNOSIS — Z31.49 PROCREATION MANAGEMENT INVESTIGATION AND TESTING: Primary | ICD-10-CM

## 2024-12-11 PROCEDURE — T1013 SIGN LANG/ORAL INTERPRETER: HCPCS | Mod: GT,TEL,95

## 2024-12-11 PROCEDURE — G0463 HOSPITAL OUTPT CLINIC VISIT: HCPCS | Performed by: STUDENT IN AN ORGANIZED HEALTH CARE EDUCATION/TRAINING PROGRAM

## 2024-12-11 PROCEDURE — 76856 US EXAM PELVIC COMPLETE: CPT

## 2024-12-11 PROCEDURE — 76856 US EXAM PELVIC COMPLETE: CPT | Mod: 26 | Performed by: OBSTETRICS & GYNECOLOGY

## 2024-12-11 RX ORDER — FOLIC ACID 0.8 MG
1000 TABLET ORAL DAILY
COMMUNITY

## 2024-12-11 RX ORDER — CLOMIPHENE CITRATE 50 MG/1
50 TABLET ORAL DAILY
Qty: 5 TABLET | Refills: 3 | Status: SHIPPED | OUTPATIENT
Start: 2024-12-11

## 2024-12-11 RX ORDER — PRENATAL VIT/IRON FUM/FOLIC AC 27MG-0.8MG
1 TABLET ORAL DAILY
COMMUNITY

## 2024-12-11 NOTE — LETTER
"2024       RE: Meaghan Garcia  [address]  [place] [address]     Dear Colleague,    Thank you for referring your patient, Meaghan Garcia, to the [facility]. Please see a copy of my visit note below.    [facility]    Seen with a Elisabeth     HPI: Meaghan Garcia is a 39 year old  who presents to clinic today to discuss secondary infertility. Recently got  and desires to conceive immediately. They have been trying without success for 2 months. She has a child with osteogenesis imperfecta from a previous marriage. She did not use contraception in between previous pregnancy and now and did not become pregnant. Previous labs were significant for low AMH.  She had a progesterone level on day 22 consistent with an ovulatory cycle.  Patient is interested in medications for ovulation induction.     She would like to discuss the ultrasound done today. She did not desire transvaginal ultrasound due to pain.     New  has fathered biological children- last 3 years. She will ask about semen analysis and is unsure if he would perform it.     OB History:   - 2018. C- Section- \"unable to urn baby- he was head up\"    Gyn History:   Menses: Regular, every 28-30 days, reviewed on phone tre with her  STI Hx: No  Contraception: None  Sexual activity: Monogamous with   Pap smear history: Last pap NILM, HPV neg 23    Labs:   12/3/24 Progesterone 5.0  24   Estradiol 24  TSH 2.96  FSH 17.9   AMH 0.098, previously 0.113    From 24 HgbA1c 5.1%     Imaging: Abdominal ultrasound done today  Uterus: anteverted. Contour is smooth/regular.  Size: 7.2 x 4.0 x 3.6 cm  Endometrium: Thickness Total 8.0 mm     Findings:   Right Ovary:  Not visualized due to overlying bowel gas  Left Ovary: 2.8 x 1.7 x 1.9 cm. Wnl  Cul de Sac Free Fluid: No free fluid  Technique: " Transabdominal Imaging performed, transvaginal imaging declined     Impression: Suboptimal imaging of pelvic organs given transabdominal approach. No overt structural uterine abnormalities.     PMH, PSH, Family history, Social history, medications and allergies were reviewed and updated in EMR.     Objective:   Ht 1.524 m (5')   Wt 70.8 kg (156 lb)   LMP 2024 (Exact Date)   BMI 30.47 kg/m    General: Healthy appearing. Alert, oriented. Affect is appropriate.     Assessment/Plan:   Meaghan Garcia is a 39 year old  female here for infertility evaluation. We discussed how to track menses and ovulation including use of her Reddy tre. Discussed options for infertility evaluation including continued period tracking and coordinating timing of intimacy. Due to elevated FSH and AMA, she would be a candidate for starting clomid for ovulation stimulation. Discussed instructions how to take clomid and to monitor for side effects. Plan to re-test progesterone 7 days after ovulation to assess for anovulatory cycle. Discussed use of prenatal vitamins and recommended daily use of 1 prenatal vitamin without additional supplementation of folic acid.   - Clomid 50mg daily CD3-8  - Standing progesterone order- plan to communicate results via MyChart  - RTC in 3 months if needed for additional evaluation  - Reviewed timing of intercourse with ovulation  - Consider HSG/semen analysis if no pregnancy with 3 months of ovulatory cycles and timed intercourse    Patient seen and evaluated with Dr. Marquis Lipscomb MD  Obstetrics, Gynecology & Women's Health   Resident, PGY-1    I examined Meaghan Garcia with Dr. Lipscomb and agree with the presentation, exam and plan of care documented in this note with edits by me.   A total of 62 minutes was spent by myself reviewing results, prior history, imaging, face-to-face with the patient and documentation.  MD Daija Holliday MD      Again, thank you  for allowing me to participate in the care of your patient.      Sincerely,    Daija Cho MD

## 2024-12-11 NOTE — PROGRESS NOTES
"[facility]    Seen with a Hungarian     HPI: Meaghan Garcia is a 39 year old  who presents to clinic today to discuss secondary infertility. Recently got  and desires to conceive immediately. They have been trying without success for 2 months. She has a child with osteogenesis imperfecta from a previous marriage. She did not use contraception in between previous pregnancy and now and did not become pregnant. Previous labs were significant for low AMH.  She had a progesterone level on day 22 consistent with an ovulatory cycle.  Patient is interested in medications for ovulation induction.     She would like to discuss the ultrasound done today. She did not desire transvaginal ultrasound due to pain.     New  has fathered biological children- last 3 years. She will ask about semen analysis and is unsure if he would perform it.     OB History:   - 2018. C- Section- \"unable to urn baby- he was head up\"    Gyn History:   Menses: Regular, every 28-30 days, reviewed on phone tre with her  STI Hx: No  Contraception: None  Sexual activity: Monogamous with   Pap smear history: Last pap NILM, HPV neg 23    Labs:   12/3/24 Progesterone 5.0  24   Estradiol 24  TSH 2.96  FSH 17.9   AMH 0.098, previously 0.113    From 24 HgbA1c 5.1%     Imaging: Abdominal ultrasound done today  Uterus: anteverted. Contour is smooth/regular.  Size: 7.2 x 4.0 x 3.6 cm  Endometrium: Thickness Total 8.0 mm     Findings:   Right Ovary:  Not visualized due to overlying bowel gas  Left Ovary: 2.8 x 1.7 x 1.9 cm. Wnl  Cul de Sac Free Fluid: No free fluid  Technique: Transabdominal Imaging performed, transvaginal imaging declined     Impression: Suboptimal imaging of pelvic organs given transabdominal approach. No overt structural uterine abnormalities.     PMH, PSH, Family history, Social history, medications and allergies were reviewed and updated in EMR.     Objective:   Ht 1.524 " m (5')   Wt 70.8 kg (156 lb)   LMP 2024 (Exact Date)   BMI 30.47 kg/m    General: Healthy appearing. Alert, oriented. Affect is appropriate.     Assessment/Plan:   Meaghan Garcia is a 39 year old  female here for infertility evaluation. We discussed how to track menses and ovulation including use of her Reddy tre. Discussed options for infertility evaluation including continued period tracking and coordinating timing of intimacy. Due to elevated FSH and AMA, she would be a candidate for starting clomid for ovulation stimulation. Discussed instructions how to take clomid and to monitor for side effects. Plan to re-test progesterone 7 days after ovulation to assess for anovulatory cycle. Discussed use of prenatal vitamins and recommended daily use of 1 prenatal vitamin without additional supplementation of folic acid.   - Clomid 50mg daily CD3-8  - Standing progesterone order- plan to communicate results via MyChart  - RTC in 3 months if needed for additional evaluation  - Reviewed timing of intercourse with ovulation  - Consider HSG/semen analysis if no pregnancy with 3 months of ovulatory cycles and timed intercourse    Patient seen and evaluated with Dr. Marquis Lipscomb MD  Obstetrics, Gynecology & Women's Health   Resident, PGY-1    I examined Meaghan Garcia with Dr. Lipscomb and agree with the presentation, exam and plan of care documented in this note with edits by me.   A total of 62 minutes was spent by myself reviewing results, prior history, imaging, face-to-face with the patient and documentation.  MD Daija Holliday MD

## 2024-12-11 NOTE — NURSING NOTE
Chief Complaint   Patient presents with    Establish Care     C/O diminished ovarian reserve due to low antral follicle    CELESTINE Alan  Baljit

## 2024-12-11 NOTE — PATIENT INSTRUCTIONS
Take clomid 50mg from cycle day 3-8 of your cycle. Get a progesterone level drawn 7-9 days after ovulation.       Thank you for trusting us with your care!   Please be aware, if you are on Mychart, you may see your results prior to your providers review. If labs are abnormal, we will call or message you on Mychart with a follow up plan.    If you need to contact us for questions about:  Symptoms, Scheduling & Medical Questions; Non-urgent (2-3 day response) Feedbooks message, Urgent (needing response today) [phone] (if after 3:30pm next day response)   Prescriptions: Please call your Pharmacy   Billing: [place] [phone] or  Physicians:[phone]

## 2024-12-18 ENCOUNTER — PATIENT OUTREACH (OUTPATIENT)
Dept: CARE COORDINATION | Facility: CLINIC | Age: 39
End: 2024-12-18
Payer: COMMERCIAL

## 2024-12-19 ENCOUNTER — TELEPHONE (OUTPATIENT)
Dept: NEUROLOGY | Facility: CLINIC | Age: 39
End: 2024-12-19
Payer: COMMERCIAL

## 2024-12-19 NOTE — TELEPHONE ENCOUNTER
Patient confirmed scheduled appointment:  Date: 1/28/2025  Time: 3:15 pm  Visit type: EMG  Provider: Joseph  Location: [facility]  Testing/imaging: NA  Additional notes:     Change in provider template.    Amie Bennett on 12/19/2024 at 11:58 AM

## 2025-02-07 DIAGNOSIS — E28.39 DIMINISHED OVARIAN RESERVE DUE TO LOW ANTRAL FOLLICLE: ICD-10-CM

## 2025-02-07 NOTE — TELEPHONE ENCOUNTER
Pt needs refill by Sunday. Would like it filled asap. She said she did not  all refills from the pharmacy but the pharmacy wont give it to her unless the provider sends another order

## 2025-02-08 DIAGNOSIS — G89.29 CHRONIC PAIN OF RIGHT KNEE: ICD-10-CM

## 2025-02-08 DIAGNOSIS — M25.561 CHRONIC PAIN OF RIGHT KNEE: ICD-10-CM

## 2025-02-10 ENCOUNTER — PATIENT OUTREACH (OUTPATIENT)
Dept: CARE COORDINATION | Facility: CLINIC | Age: 40
End: 2025-02-10

## 2025-02-11 RX ORDER — CLOMIPHENE CITRATE 50 MG/1
50 TABLET ORAL DAILY
Qty: 5 TABLET | Refills: 3 | Status: SHIPPED | OUTPATIENT
Start: 2025-02-11

## 2025-02-11 RX ORDER — DICLOFENAC SODIUM 75 MG/1
75 TABLET, DELAYED RELEASE ORAL DAILY
Qty: 30 TABLET | Refills: 0 | Status: SHIPPED | OUTPATIENT
Start: 2025-02-11

## 2025-02-24 ENCOUNTER — TELEPHONE (OUTPATIENT)
Dept: FAMILY MEDICINE | Facility: CLINIC | Age: 40
End: 2025-02-24

## 2025-02-24 NOTE — TELEPHONE ENCOUNTER
Reason for Call:  Appointment Request    Patient requesting this type of appt:  Symptoms with meds     Requested provider: Vasile White    Reason patient unable to be scheduled: Not within requested timeframe    When does patient want to be seen/preferred time:  March 1st or after     Comments: she would like to get in asap after march 1st as she will have insurance     Okay to leave a detailed message?: Yes at Cell number on file:    Telephone Information:   Mobile [phone]       Call taken on 2/24/2025 at 10:59 AM by Kassy Gonzalez

## 2025-02-24 NOTE — TELEPHONE ENCOUNTER
Pt calling, with  on the line.   Pt is wondering whether she should stop her recently prescribed medication clomiPHENE (CLOMID) 50 MG tablet.   She says her side effects from the medication are worsening, including a very heavy and more frequent period.   Medication was prescribed by [facility] on 12/11/24.     Unable to provide advice on outside medication - advised pt contact [facility].   Offered appt with Dr. White as early as Wednesday 2/26/25.   Pt notes she does not have insurance right now, so cannot schedule an appointment with Dr. White until her insurance is active again in March 2025.   Scheduled appt per pt request.     Pt requested that a message be sent to the team at [facility] [place].  She is looking for advice from their team on whether to continue her medication and to advise her on schedule of when to restart.   Advised pt that someone from [facility] would give her a call back in regards to her medication question.     Best callback # [phone]   Pt requested return phone call phoebe BASS RN

## 2025-02-25 NOTE — TELEPHONE ENCOUNTER
S-(situation): Called Meaghan to assess potential AEs of Clomid further    B-(background): Evaluated on 12/11/24 by Dr. Cho for secondary fertility.  Prescribed clomid x3 + standing progesterone draw. Pt did not have any progesterone levels drawn the past two months.    A-(assessment): Pt has been experiencing some side effects that she attributes to her Clomid use:    1st month, she had a mild headache while taking the medication.  2nd month, she got her period on February 7th and then again on February 22nd. Experienced the same headache/lower back ache while taking the medication during the 2/7 cycle (didn't take 2/22).    She has been taking on days 3-8 as instructed, timing intercourse as well. No positive pregnancy test.  Inquired on why she had not gotten day 21 progesterone done -- pt's insurance was cut off so she is both unable to make appts and to get day 21 labs. However, she will be qualified for insurance this March and plans to have the day 21 lab drawn next month.    She is wondering if she should stop taking the Clomid due to these side effects.    R-(recommendations): UpToDate states both HA and menstrual irregularities are rare side effects of Clomid (1%). Routing to Dr. Cho for insight.

## 2025-02-26 ENCOUNTER — APPOINTMENT (OUTPATIENT)
Dept: INTERPRETER SERVICES | Facility: CLINIC | Age: 40
End: 2025-02-26

## 2025-02-26 NOTE — TELEPHONE ENCOUNTER
"Relayed Dr. Cho's recommendations to the patient via . She denies vision changes at this time.    Patient inquired if she should continue Clomid and if she should be seen in clinic. Encouraged use of Clomid per provider instructions and to get progesterone drawn on day 21 (around March 15). She can get these drawn on March 14 if she desires. Patient requesting if she should wait until March to take it or take now, for the second time in February.     Period on 2/7 lasted 5 days.   Period on 2/22 still ongoing- states she is bleeding much heavier this time, and passing clots the size of a marble. She last took a home pregnancy test in beginning of February.     Relayed Dr. Cho's recommendation as noted on 12/11/2024: \"RTC in 3 months if needed for additional evaluation.\" Routed to  to schedule to ensure she can be seen.    Routed to provider for clarification.  "

## 2025-02-26 NOTE — TELEPHONE ENCOUNTER
Relayed provider recommendations. Patient voices understanding. Requested patient to call back when she has her next period so we can schedule/order day 21 labs, patient confirms she will do this.

## 2025-03-05 ENCOUNTER — VIRTUAL VISIT (OUTPATIENT)
Dept: FAMILY MEDICINE | Facility: CLINIC | Age: 40
End: 2025-03-05
Payer: COMMERCIAL

## 2025-03-05 DIAGNOSIS — E28.39 DIMINISHED OVARIAN RESERVE DUE TO LOW ANTRAL FOLLICLE: Primary | ICD-10-CM

## 2025-03-05 DIAGNOSIS — N97.9 SECONDARY FEMALE INFERTILITY: ICD-10-CM

## 2025-03-05 PROCEDURE — 98013 SYNCH AUDIO-ONLY EST LOW 20: CPT | Performed by: FAMILY MEDICINE

## 2025-03-05 NOTE — PROGRESS NOTES
Meaghan is a 39 year old who is being evaluated via a billable telephone visit.    What phone number would you like to be contacted at? [phone]   How would you like to obtain your AVS? Monster  Originating Location (pt. Location): Home    Distant Location (provider location):  On-site    Assessment & Plan   Diagnoses and all orders for this visit:    Diminished ovarian reserve due to low antral follicle  Secondary female infertility  Pleasant 39-year-old G1, P1.  Patient scheduled a virtual visit to discuss recent labs.  Her medical history significant for low ovarian reserve and secondary infertility.  She has a child with osteogenesis imperfecta From a previous marriage.  Recently got  and desires to conceive immediately.  Previous labs were significant for low AMH.  Patient was referred to OB/GYN for ovarian stimulation with Clomid.  She completed 2 months of Clomid but had 2 cycles in the month of February.    Advised patient to keep her upcoming appointment with OB/GYN on April 6, 2025.    Patient had 2 menstrual cycles in the month of February and her most recent cycle was on February 22, 2025.  If she does not have a menstrual cycle on the first 2 weeks of March I recommended checking her progesterone level on day 21 which is March 14.  Follow-up:  Return in about 3 months (around 6/5/2025) for Annual Physical Exam.     BMI  Estimated body mass index is 30.47 kg/m  as calculated from the following:    Height as of 12/11/24: 1.524 m (5').    Weight as of 12/11/24: 70.8 kg (156 lb).       Subjective   Meaghan is a 39 year old, presenting for the following health issues:  Recheck Medication        12/5/2024     9:33 AM   Additional Questions   Roomed by Feliberto CARROLL   Patient is a G1, P1 pleasant female with past medical history of decreased ovarian reserve and anovulatory cycles.  She has a child with osteogenesis imperfecta from her previous marriage.  Recently got  and desires to  conceive immediately.  Patient reports completing the genetic test previously but does not recall the details of the results.  In our previous visit patient stated that her genetic test was negative.  Patient was seen and evaluated by OB/GYN and was started on Clomid.  She completed 2 cycles with Clomid.  She had 2 menstrual cycles in the month of February the first 1 was on February 7, 2025 and the second  cycle was on February 22, 2025.      Review of Systems  Constitutional, neuro, ENT, endocrine, pulmonary, cardiac, gastrointestinal, genitourinary, musculoskeletal, integument and psychiatric systems are negative, except as otherwise noted.      Objective           Vitals:  No vitals were obtained today due to virtual visit.    Physical Exam   General: Alert and no distress //Respiratory: No audible wheeze, cough, or shortness of breath // Psychiatric:  Appropriate affect, tone, and pace of words      No results found for any visits on 03/05/25.      Phone call duration: 22 minutes  Signed Electronically by: Vasile White MD

## 2025-03-13 ENCOUNTER — LAB (OUTPATIENT)
Dept: LAB | Facility: CLINIC | Age: 40
End: 2025-03-13
Payer: COMMERCIAL

## 2025-03-13 DIAGNOSIS — E28.39 DIMINISHED OVARIAN RESERVE DUE TO LOW ANTRAL FOLLICLE: ICD-10-CM

## 2025-03-13 DIAGNOSIS — M25.561 CHRONIC PAIN OF RIGHT KNEE: ICD-10-CM

## 2025-03-13 DIAGNOSIS — G89.29 CHRONIC PAIN OF RIGHT KNEE: ICD-10-CM

## 2025-03-13 LAB
BASOPHILS # BLD AUTO: 0 10E3/UL (ref 0–0.2)
BASOPHILS NFR BLD AUTO: 1 %
EOSINOPHIL # BLD AUTO: 0 10E3/UL (ref 0–0.7)
EOSINOPHIL NFR BLD AUTO: 1 %
ERYTHROCYTE [DISTWIDTH] IN BLOOD BY AUTOMATED COUNT: 11.9 % (ref 10–15)
HCT VFR BLD AUTO: 41.1 % (ref 35–47)
HGB BLD-MCNC: 14.3 G/DL (ref 11.7–15.7)
IMM GRANULOCYTES # BLD: 0 10E3/UL
IMM GRANULOCYTES NFR BLD: 0 %
LYMPHOCYTES # BLD AUTO: 1.8 10E3/UL (ref 0.8–5.3)
LYMPHOCYTES NFR BLD AUTO: 49 %
MCH RBC QN AUTO: 31.3 PG (ref 26.5–33)
MCHC RBC AUTO-ENTMCNC: 34.8 G/DL (ref 31.5–36.5)
MCV RBC AUTO: 90 FL (ref 78–100)
MONOCYTES # BLD AUTO: 0.3 10E3/UL (ref 0–1.3)
MONOCYTES NFR BLD AUTO: 9 %
NEUTROPHILS # BLD AUTO: 1.5 10E3/UL (ref 1.6–8.3)
NEUTROPHILS NFR BLD AUTO: 41 %
PLATELET # BLD AUTO: 274 10E3/UL (ref 150–450)
RBC # BLD AUTO: 4.57 10E6/UL (ref 3.8–5.2)
WBC # BLD AUTO: 3.6 10E3/UL (ref 4–11)

## 2025-03-15 LAB — PROGEST SERPL-MCNC: 0.1 NG/ML

## 2025-03-23 DIAGNOSIS — M25.561 CHRONIC PAIN OF RIGHT KNEE: ICD-10-CM

## 2025-03-23 DIAGNOSIS — G89.29 CHRONIC PAIN OF RIGHT KNEE: ICD-10-CM

## 2025-03-24 RX ORDER — DICLOFENAC SODIUM 75 MG/1
75 TABLET, DELAYED RELEASE ORAL DAILY
Qty: 15 TABLET | Refills: 0 | Status: SHIPPED | OUTPATIENT
Start: 2025-03-24

## 2025-03-26 ENCOUNTER — OFFICE VISIT (OUTPATIENT)
Dept: NEUROLOGY | Facility: CLINIC | Age: 40
End: 2025-03-26
Attending: FAMILY MEDICINE
Payer: COMMERCIAL

## 2025-03-26 DIAGNOSIS — Z53.9 NO SHOW: Primary | ICD-10-CM

## 2025-03-26 PROCEDURE — 99207 PR NO SHOW FOR SCHEDULED APPT: CPT | Performed by: PSYCHIATRY & NEUROLOGY

## 2025-03-26 NOTE — LETTER
3/26/2025       RE: Meaghan Garcia  [address]  [place] [address]     Dear Colleague,    Thank you for referring your patient, Meaghan Garcia, to the [facility]. Please see a copy of my visit note below.    No show for EMG      Again, thank you for allowing me to participate in the care of your patient.      Sincerely,    Juan Hector MD

## 2025-04-09 ENCOUNTER — OFFICE VISIT (OUTPATIENT)
Dept: OBGYN | Facility: CLINIC | Age: 40
End: 2025-04-09
Attending: STUDENT IN AN ORGANIZED HEALTH CARE EDUCATION/TRAINING PROGRAM
Payer: COMMERCIAL

## 2025-04-09 VITALS
SYSTOLIC BLOOD PRESSURE: 106 MMHG | BODY MASS INDEX: 31.16 KG/M2 | WEIGHT: 158.7 LBS | DIASTOLIC BLOOD PRESSURE: 74 MMHG | HEIGHT: 60 IN | HEART RATE: 68 BPM

## 2025-04-09 DIAGNOSIS — E28.39 DIMINISHED OVARIAN RESERVE DUE TO LOW ANTRAL FOLLICLE: Primary | ICD-10-CM

## 2025-04-09 PROCEDURE — G0463 HOSPITAL OUTPT CLINIC VISIT: HCPCS | Performed by: STUDENT IN AN ORGANIZED HEALTH CARE EDUCATION/TRAINING PROGRAM

## 2025-04-09 PROCEDURE — T1013 SIGN LANG/ORAL INTERPRETER: HCPCS | Mod: U3

## 2025-04-09 RX ORDER — LETROZOLE 2.5 MG/1
TABLET, FILM COATED ORAL
Qty: 15 TABLET | Refills: 0 | Status: SHIPPED | OUTPATIENT
Start: 2025-04-09

## 2025-04-09 NOTE — LETTER
"2025       RE: Meaghan Garcia  [address]  [facility] [address]     Dear Colleague,    Thank you for referring your patient, Meaghan Garcia, to the [facility] at [facility]. Please see a copy of my visit note below.    [facility]    Seen with a Brazilian     HPI: Meaghan Garcia is a 39 year old  who presents to clinic today for a follow-up discussion of secondary infertility with low ovarian reserve.     She started clomid in January and experienced fatigue, headache, abdominal pain, increased clear vaginal discharge, and breast pain. Menses on  and  (for 7 days). No menses in March, then on . She has been taking clomid starting on 3rd day of cycle in January and February, and again this month. Not aware of ovulation strips and she is not tracking LH. Her  is a  and is frequently out of state, so timing intercourse has been hard.     She has a child with osteogenesis imperfecta from a previous marriage. She did not use contraception in between previous pregnancy and now and did not become pregnant. Previous labs were significant for low AMH. She had a progesterone level on day 22 consistent with an ovulatory cycle, and was interested in trying medication for ovulation induction. New  has fathered biological children- last 3 years. She will ask about semen analysis and is unsure if he would perform it.     OB History:   - 2018. C- Section- \"unable to turn baby- he was head up\"    Gyn History:   Menses: Regular, every 28-30 days, reviewed on phone tre with her  STI Hx: No  Contraception: None  Sexual activity: Monogamous with   Pap smear history: Last pap NILM, HPV neg 23    Labs:   3/13/25 Progesterone 0.1, CBC normal  12/3/24 Progesterone 5.0  24   - Estradiol 24  - TSH 2.96  - FSH 17.9   - AMH 0.098, previously 0.113    From 24 " "HgbA1c 5.1%     Imaging: Abdominal ultrasound  Uterus: anteverted. Contour is smooth/regular.  Size: 7.2 x 4.0 x 3.6 cm  Endometrium: Thickness Total 8.0 mm  Findings:   Right Ovary:  Not visualized due to overlying bowel gas  Left Ovary: 2.8 x 1.7 x 1.9 cm. Wnl  Cul de Sac Free Fluid: No free fluid  Technique: Transabdominal Imaging performed, transvaginal imaging declined     Impression: Suboptimal imaging of pelvic organs given transabdominal approach. No overt structural uterine abnormalities.     PMH, PSH, Family history, Social history, medications and allergies were reviewed and updated in EMR.     Objective:   /74   Pulse 68   Ht 1.535 m (5' 0.43\")   Wt 72 kg (158 lb 11.2 oz)   LMP 2025 (Exact Date)   BMI 30.55 kg/m    General: Healthy appearing. Alert, oriented. Affect is appropriate.     Assessment/Plan:   Meaghan Garcia is a 39 year old  female here for infertility follow-up. We previously discussed how to track menses and ovulation including use of her [employer] tre, and today discussed timing of ovulation kits and intercourse. Plan to re-test progesterone 7 days after ovulation to assess for anovulatory cycle. Discussed use of prenatal vitamins and recommended daily use of 1 prenatal vitamin without additional supplementation of folic acid.   - Will switch to letrozole for next 3 cycles given side effects. Instructed patient to continue with clomid for this cycle.   - Standing progesterone order- plan to communicate results via [employer]  - If no conception within 3 months, would plan to send to [facility]. Discussed IVF and possible IUI.  - Reviewed timing of intercourse with ovulation  - Consider HSG/semen analysis, currently declines    Patient seen and discussed with Dr. Cho.    Freddy Rouse MD, MSc  [facility], PGY-1  2025 4:36 PM    I examined Meaghan Garcia with Dr. Rouse and agree with the presentation, exam and plan of care documented in this note with edits by me.   Daija " MD Marquis         Again, thank you for allowing me to participate in the care of your patient.      Sincerely,    Daija Cho MD

## 2025-04-09 NOTE — PROGRESS NOTES
"[facility]    Seen with a Armenian     HPI: Meaghan Garcia is a 39 year old  who presents to clinic today for a follow-up discussion of secondary infertility with low ovarian reserve.     She started clomid in January and experienced fatigue, headache, abdominal pain, increased clear vaginal discharge, and breast pain. Menses on  and  (for 7 days). No menses in March, then on . She has been taking clomid starting on 3rd day of cycle in January and February, and again this month. Not aware of ovulation strips and she is not tracking LH. Her  is a  and is frequently out of state, so timing intercourse has been hard.     She has a child with osteogenesis imperfecta from a previous marriage. She did not use contraception in between previous pregnancy and now and did not become pregnant. Previous labs were significant for low AMH. She had a progesterone level on day 22 consistent with an ovulatory cycle, and was interested in trying medication for ovulation induction. New  has fathered biological children- last 3 years. She will ask about semen analysis and is unsure if he would perform it.     OB History:   - 2018. C- Section- \"unable to turn baby- he was head up\"    Gyn History:   Menses: Regular, every 28-30 days, reviewed on phone tre with her  STI Hx: No  Contraception: None  Sexual activity: Monogamous with   Pap smear history: Last pap NILM, HPV neg 23    Labs:   3/13/25 Progesterone 0.1, CBC normal  12/3/24 Progesterone 5.0  24   - Estradiol 24  - TSH 2.96  - FSH 17.9   - AMH 0.098, previously 0.113    From 24 HgbA1c 5.1%     Imaging: Abdominal ultrasound  Uterus: anteverted. Contour is smooth/regular.  Size: 7.2 x 4.0 x 3.6 cm  Endometrium: Thickness Total 8.0 mm  Findings:   Right Ovary:  Not visualized due to overlying bowel gas  Left Ovary: 2.8 x 1.7 x 1.9 cm. Wnl  Cul de Sac Free Fluid: No free fluid  Technique: " "Transabdominal Imaging performed, transvaginal imaging declined     Impression: Suboptimal imaging of pelvic organs given transabdominal approach. No overt structural uterine abnormalities.     PMH, PSH, Family history, Social history, medications and allergies were reviewed and updated in EMR.     Objective:   /74   Pulse 68   Ht 1.535 m (5' 0.43\")   Wt 72 kg (158 lb 11.2 oz)   LMP 2025 (Exact Date)   BMI 30.55 kg/m    General: Healthy appearing. Alert, oriented. Affect is appropriate.     Assessment/Plan:   Meaghan Garcia is a 39 year old  female here for infertility follow-up. We previously discussed how to track menses and ovulation including use of her [employer] tre, and today discussed timing of ovulation kits and intercourse. Plan to re-test progesterone 7 days after ovulation to assess for anovulatory cycle. Discussed use of prenatal vitamins and recommended daily use of 1 prenatal vitamin without additional supplementation of folic acid.   - Will switch to letrozole for next 3 cycles given side effects. Instructed patient to continue with clomid for this cycle.   - Standing progesterone order- plan to communicate results via [employer]  - If no conception within 3 months, would plan to send to [facility]. Discussed IVF and possible IUI.  - Reviewed timing of intercourse with ovulation  - Consider HSG/semen analysis, currently declines    Patient seen and discussed with Dr. Cho.    Freddy Rouse MD, MSc  [facility], PGY-1  2025 4:36 PM    I examined Meaghan Garcia with Dr. Rouse and agree with the presentation, exam and plan of care documented in this note with edits by me.   Daija Cho MD       "

## 2025-04-09 NOTE — PATIENT INSTRUCTIONS
Thank you for trusting us with your care!   Please be aware, if you are on Mychart, you may see your results prior to your providers review. If labs are abnormal, we will call or message you on [employer] with a follow up plan.    If you need to contact us for questions about:  Symptoms, Scheduling & Medical Questions; Non-urgent (2-3 day response) Evino message, Urgent (needing response today) [phone] (if after 3:30pm next day response)   Prescriptions: Please call your Pharmacy   Billing: [facility] [phone] or [facility]:[phone]

## 2025-04-14 ENCOUNTER — TELEPHONE (OUTPATIENT)
Dept: OBGYN | Facility: CLINIC | Age: 40
End: 2025-04-14
Payer: COMMERCIAL

## 2025-04-14 NOTE — TELEPHONE ENCOUNTER
This RN called and spoke to Meaghan. Asked her about her question she has regarding medication that was sent in on 4/9/25. Answered questions regarding medication side effects in depth of Clomid and Letrozole. Discussed that since she has not gotten pregnant using Clomid, the doctor is trying letrozole for her and will do so for 3 months. If she is not able to conceive by then, Dr. Smith and Meaghan discussed going to a fertility clinic for possible IVF and IUI.     She asked questions pertaining to spotting and if that is normal, this RN relayed that some spotting can happen as we are trying to get her body into ovulation which is the timing you want to have intercourse in hope to conceive. :Lots of hormone changes that can sometimes result in spotting. Discussed using the [employer] tre to track cycles and to know when she is ovulating.  Asked her if she is aware of when to have progesterone lab drawn, She did know about this and said she will do this once she starts letrozole. She is taking some time in between using Clomid and Letrozole, wanting her body to have some time before going on another medication. All questions answered.  She voiced understanding.       Patient requested to cancel next appointment with Dr. Smith. This RN canceled appointment with her while on the phone

## 2025-04-14 NOTE — TELEPHONE ENCOUNTER
[facility]    Phone Message    May a detailed message be left on voicemail: yes     Reason for Call: Other: Pt has concerns about the medications that were recently prescribed by Dr. Cho. Please reach out to pt to discuss further.      Action Taken: Other: OBGYN    Travel Screening: Not Applicable     Date of Service:

## 2025-04-23 ENCOUNTER — VIRTUAL VISIT (OUTPATIENT)
Dept: FAMILY MEDICINE | Facility: CLINIC | Age: 40
End: 2025-04-23
Payer: COMMERCIAL

## 2025-04-23 DIAGNOSIS — E28.39 DIMINISHED OVARIAN RESERVE DUE TO LOW ANTRAL FOLLICLE: ICD-10-CM

## 2025-04-23 DIAGNOSIS — N97.9 FEMALE INFERTILITY: Primary | ICD-10-CM

## 2025-04-23 PROCEDURE — 98014 SYNCH AUDIO-ONLY EST MOD 30: CPT | Performed by: FAMILY MEDICINE

## 2025-04-23 NOTE — PROGRESS NOTES
"Meaghan is a 40 year old who is being evaluated via a billable telephone visit.    What phone number would you like to be contacted at? [phone]   How would you like to obtain your AVS? Mail a copy  Originating Location (pt. Location): Home  Distant Location (provider location):  On-site  Telephone visit completed due to the patient did not have access to video, while the distant provider did.    Assessment & Plan     Female infertility  Diminished ovarian reserve due to low antral follicle  Pleasant 40-year-old G1, P1.  Patient scheduled a virtual visit to discuss recent labs.  Her medical history significant for low ovarian reserve and secondary infertility.  She has a child with osteogenesis imperfecta From a previous marriage.  Recently got  and desires to conceive immediately.  Previous labs were significant for low AMH.  Patient was referred to OB/GYN for ovarian stimulation with Clomid.  She completed 3 months of Clomid but had 2 cycles in the month of February.  She had an appointment with her OB/GYN on April 6, 2025.  She was given a prescription for letrozole.  In today's visit patient had questions regarding Clomid versus letrozole.  We briefly discussed both medications.  We also discussed IUI and IVF.  Patient was informed that clomiphene \"Clomid \"and letrozole or both oral ovulation induction agents used in anovulatory infertility.  Clomid is a selective estrogen receptor modulator that works by blocking the estrogen receptors in the hypothalamus while letrozole is an aromatase inhibitor that reduces estrogen production and increases FSH release.  Letrozole has been shown in multiple trials to result in a higher ovulation and live birth than Clomid.  Letrozole was also associated with lower risk of multiple gestations and thinner endometrial effect in comparison to Clomid.  Advised patient to continue with OB/GYN's recommendation.    BMI  Estimated body mass index is 30.55 kg/m  as " "calculated from the following:    Height as of 4/9/25: 1.535 m (5' 0.43\").    Weight as of 4/9/25: 72 kg (158 lb 11.2 oz).       Follow-up   Continue to follow-up with OB/GYN    Jaspal Harding is a 40 year old, presenting for the following health issues:  Recheck Medication (Letrozole questions)        4/23/2025    11:12 AM   Additional Questions   Roomed by Jessica HAMILTON     History of Present Illness       Reason for visit:  Questions about Lotrizole   She is taking medications regularly.    Pleasant 40-year-old G1, P1.  Patient scheduled a virtual visit to discuss recent labs.  Her medical history significant for low ovarian reserve and secondary infertility.  She has a child with osteogenesis imperfecta From a previous marriage.  Recently got  and desires to conceive immediately.  Previous labs were significant for low AMH.  Patient was referred to OB/GYN for ovarian stimulation with Clomid.  She completed 3 months of Clomid but had 2 cycles in the month of February.  She had an appointment with her OB/GYN on April 6, 2025.  She was given a prescription for letrozole.      Review of Systems  Constitutional, neuro, ENT, endocrine, pulmonary, cardiac, gastrointestinal, genitourinary, musculoskeletal, integument and psychiatric systems are negative, except as otherwise noted.      Objective           Vitals:  No vitals were obtained today due to virtual visit.    Physical Exam   General: Alert and no distress //Respiratory: No audible wheeze, cough, or shortness of breath // Psychiatric:  Appropriate affect, tone, and pace of words      Lab on 03/13/2025   Component Date Value Ref Range Status    WBC Count 03/13/2025 3.6 (L)  4.0 - 11.0 10e3/uL Final    RBC Count 03/13/2025 4.57  3.80 - 5.20 10e6/uL Final    Hemoglobin 03/13/2025 14.3  11.7 - 15.7 g/dL Final    Hematocrit 03/13/2025 41.1  35.0 - 47.0 % Final    MCV 03/13/2025 90  78 - 100 fL Final    MCH 03/13/2025 31.3  26.5 - 33.0 pg Final    MCHC " 03/13/2025 34.8  31.5 - 36.5 g/dL Final    RDW 03/13/2025 11.9  10.0 - 15.0 % Final    Platelet Count 03/13/2025 274  150 - 450 10e3/uL Final    % Neutrophils 03/13/2025 41  % Final    % Lymphocytes 03/13/2025 49  % Final    % Monocytes 03/13/2025 9  % Final    % Eosinophils 03/13/2025 1  % Final    % Basophils 03/13/2025 1  % Final    % Immature Granulocytes 03/13/2025 0  % Final    Absolute Neutrophils 03/13/2025 1.5 (L)  1.6 - 8.3 10e3/uL Final    Absolute Lymphocytes 03/13/2025 1.8  0.8 - 5.3 10e3/uL Final    Absolute Monocytes 03/13/2025 0.3  0.0 - 1.3 10e3/uL Final    Absolute Eosinophils 03/13/2025 0.0  0.0 - 0.7 10e3/uL Final    Absolute Basophils 03/13/2025 0.0  0.0 - 0.2 10e3/uL Final    Absolute Immature Granulocytes 03/13/2025 0.0  <=0.4 10e3/uL Final    Progesterone 03/13/2025 0.1  ng/mL Final      Healthy Postmenopausal Women:        Postmenopause: <=0.1 ng/mL     Healthy Pregnant Women:        1st Trimester: 11.0-44.3 ng/mL        2nd Trimester: 25.4-83.4 ng/mL        3rd Trimester: 58.7-214.0 ng/mL     Healthy Women Cycle Phase:        Follicular: <0.1-0.2 ng/mL        Ovulation: 0.1-4.1 ng/mL        Luteal: 4.1-14.5 ng/mL    Healthy Women Cycle Sub Phase:       Early Follicular: <0.1-0.3 ng/mL       Intermediate Follicular: <0.1-0.2 ng/mL       Late Follicular: <0.1-0.2 ng/mL       Ovulation: <0.1-2.4 ng/mL       Early Luteal: 2.4-15.1 ng/mL       Intermediate Luteal: 4.8-20.9 ng/mL       Late Luteal: 0.5-13.5 ng/mL           Phone call duration: 21 minutes  Signed Electronically by: Vasile White MD

## 2025-04-28 ENCOUNTER — LAB (OUTPATIENT)
Dept: LAB | Facility: CLINIC | Age: 40
End: 2025-04-28
Payer: COMMERCIAL

## 2025-04-28 DIAGNOSIS — E28.39 DIMINISHED OVARIAN RESERVE DUE TO LOW ANTRAL FOLLICLE: ICD-10-CM

## 2025-04-28 PROCEDURE — 84144 ASSAY OF PROGESTERONE: CPT

## 2025-04-28 PROCEDURE — 36415 COLL VENOUS BLD VENIPUNCTURE: CPT

## 2025-04-29 LAB — PROGEST SERPL-MCNC: 0.2 NG/ML

## 2025-05-06 ENCOUNTER — NURSE TRIAGE (OUTPATIENT)
Dept: FAMILY MEDICINE | Facility: CLINIC | Age: 40
End: 2025-05-06
Payer: COMMERCIAL

## 2025-05-06 NOTE — TELEPHONE ENCOUNTER
Reason for Disposition    MILD - MODERATE headache present > 3 days (72 hours)    Additional Information    Negative: Difficult to awaken or acting confused (e.g., disoriented, slurred speech)    Negative: Weakness of the face, arm or leg on one side of the body and new-onset    Negative: Numbness of the face, arm or leg on one side of the body and new-onset    Negative: Loss of speech or garbled speech and new-onset    Negative: Passed out (e.g., fainted, lost consciousness, blacked out and was not responding)    Negative: Sounds like a life-threatening emergency to the triager    Negative: Followed a head injury within last 3 days    Negative: Traumatic Brain Injury (TBI) is suspected    Negative: Sinus pain or congestion is main symptom(s)    Negative: Influenza suspected (i.e., cough, fever, other respiratory symptoms; probable influenza exposure)    Negative: Pregnant    Negative: Unable to walk without falling    Negative: Stiff neck (can't touch chin to chest)    Negative: Other family members (or people in same household) with headaches and possibility of carbon monoxide exposure    Negative: SEVERE headache, states 'worst headache' of life    Negative: SEVERE headache, sudden-onset (i.e., reaching maximum intensity within seconds to 1 hour)    Negative: Severe pain in one eye    Negative: Loss of vision or double vision (Exception: Same as previously diagnosed migraines.)    Negative: Patient sounds very sick or weak to the triager    Negative: Fever > 103 F (39.4 C)    Negative: Fever > 100 F (37.8 C) and has diabetes mellitus or a weak immune system (e.g., HIV positive, cancer chemotherapy, organ transplant, splenectomy, chronic steroids)    Negative: SEVERE headache (e.g., excruciating) and has had severe headaches before    Negative: SEVERE headache and not relieved by pain meds    Negative: SEVERE headache and vomiting    Negative: SEVERE headache and fever    Negative: New-onset headache and weak  "immune system (e.g., HIV positive, cancer chemo, splenectomy, organ transplant, chronic steroids)    Negative: Fever present > 3 days (72 hours)    Negative: Patient wants to be seen    Negative: MODERATE headache (e.g., interferes with normal activities) present > 24 hours and unexplained    Answer Assessment - Initial Assessment Questions  1. LOCATION: \"Where does it hurt?\"       My head   2. ONSET: \"When did the headache start?\" (e.g., minutes, hours, days)       Several months   3. PATTERN: \"Does the pain come and go, or has it been constant since it started?\"      Comes and goes   4. SEVERITY: \"How bad is the pain?\" and \"What does it keep you from doing?\"  (e.g., Scale 1-10; mild, moderate, or severe)      Moderate tylenol is helpful   5. RECURRENT SYMPTOM: \"Have you ever had headaches before?\" If Yes, ask: \"When was the last time?\" and \"What happened that time?\"       Has been seen in past for this but \"I did not get diagnosis\"   6. CAUSE: \"What do you think is causing the headache?\"      Not sure pt thought maybe BP but BP has been normal in clinic and pt does not check at home   7. MIGRAINE: \"Have you been diagnosed with migraine headaches?\" If Yes, ask: \"Is this headache similar?\"       no  8. HEAD INJURY: \"Has there been any recent injury to the head?\"       no  9. OTHER SYMPTOMS: \"Do you have any other symptoms?\" (e.g., fever, stiff neck, eye pain, sore throat, cold symptoms)      Denies all   10. PREGNANCY: \"Is there any chance you are pregnant?\" \"When was your last menstrual period?\"        No    Protocols used: Headache-A-OH    "

## 2025-06-04 ENCOUNTER — OFFICE VISIT (OUTPATIENT)
Dept: FAMILY MEDICINE | Facility: CLINIC | Age: 40
End: 2025-06-04
Payer: COMMERCIAL

## 2025-06-04 VITALS
OXYGEN SATURATION: 99 % | BODY MASS INDEX: 30.25 KG/M2 | HEIGHT: 60 IN | RESPIRATION RATE: 16 BRPM | DIASTOLIC BLOOD PRESSURE: 67 MMHG | HEART RATE: 64 BPM | WEIGHT: 154.1 LBS | SYSTOLIC BLOOD PRESSURE: 93 MMHG | TEMPERATURE: 98.2 F

## 2025-06-04 DIAGNOSIS — M25.561 CHRONIC PAIN OF RIGHT KNEE: ICD-10-CM

## 2025-06-04 DIAGNOSIS — Z00.00 ENCOUNTER FOR PREVENTATIVE ADULT HEALTH CARE EXAMINATION: Primary | ICD-10-CM

## 2025-06-04 DIAGNOSIS — Z11.3 ROUTINE SCREENING FOR STI (SEXUALLY TRANSMITTED INFECTION): ICD-10-CM

## 2025-06-04 DIAGNOSIS — E55.9 VITAMIN D DEFICIENCY: ICD-10-CM

## 2025-06-04 DIAGNOSIS — G89.29 CHRONIC PAIN OF RIGHT KNEE: ICD-10-CM

## 2025-06-04 PROBLEM — O36.5910: Status: RESOLVED | Noted: 2018-03-08 | Resolved: 2025-06-04

## 2025-06-04 LAB
ALBUMIN SERPL BCG-MCNC: 4.1 G/DL (ref 3.5–5.2)
ALP SERPL-CCNC: 70 U/L (ref 40–150)
ALT SERPL W P-5'-P-CCNC: 17 U/L (ref 0–50)
ANION GAP SERPL CALCULATED.3IONS-SCNC: 10 MMOL/L (ref 7–15)
AST SERPL W P-5'-P-CCNC: 21 U/L (ref 0–45)
BILIRUB SERPL-MCNC: 0.4 MG/DL
BUN SERPL-MCNC: 10.7 MG/DL (ref 6–20)
CALCIUM SERPL-MCNC: 9.1 MG/DL (ref 8.8–10.4)
CHLORIDE SERPL-SCNC: 105 MMOL/L (ref 98–107)
CHOLEST SERPL-MCNC: 164 MG/DL
CREAT SERPL-MCNC: 0.54 MG/DL (ref 0.51–0.95)
EGFRCR SERPLBLD CKD-EPI 2021: >90 ML/MIN/1.73M2
EST. AVERAGE GLUCOSE BLD GHB EST-MCNC: 103 MG/DL
FASTING STATUS PATIENT QL REPORTED: YES
FASTING STATUS PATIENT QL REPORTED: YES
FERRITIN SERPL-MCNC: 113 NG/ML (ref 6–175)
GLUCOSE SERPL-MCNC: 94 MG/DL (ref 70–99)
HBA1C MFR BLD: 5.2 % (ref 0–5.6)
HCO3 SERPL-SCNC: 24 MMOL/L (ref 22–29)
HDLC SERPL-MCNC: 54 MG/DL
HIV 1+2 AB+HIV1 P24 AG SERPL QL IA: NONREACTIVE
LDLC SERPL CALC-MCNC: 98 MG/DL
NONHDLC SERPL-MCNC: 110 MG/DL
POTASSIUM SERPL-SCNC: 3.6 MMOL/L (ref 3.4–5.3)
PROT SERPL-MCNC: 6.9 G/DL (ref 6.4–8.3)
SODIUM SERPL-SCNC: 139 MMOL/L (ref 135–145)
TRIGL SERPL-MCNC: 59 MG/DL
TSH SERPL DL<=0.005 MIU/L-ACNC: 3.01 UIU/ML (ref 0.3–4.2)
VIT D+METAB SERPL-MCNC: 56 NG/ML (ref 20–50)

## 2025-06-04 PROCEDURE — 36415 COLL VENOUS BLD VENIPUNCTURE: CPT | Performed by: FAMILY MEDICINE

## 2025-06-04 PROCEDURE — 87491 CHLMYD TRACH DNA AMP PROBE: CPT | Performed by: FAMILY MEDICINE

## 2025-06-04 PROCEDURE — 82306 VITAMIN D 25 HYDROXY: CPT | Performed by: FAMILY MEDICINE

## 2025-06-04 PROCEDURE — 80053 COMPREHEN METABOLIC PANEL: CPT | Performed by: FAMILY MEDICINE

## 2025-06-04 PROCEDURE — 80061 LIPID PANEL: CPT | Performed by: FAMILY MEDICINE

## 2025-06-04 PROCEDURE — 87591 N.GONORRHOEAE DNA AMP PROB: CPT | Performed by: FAMILY MEDICINE

## 2025-06-04 PROCEDURE — 84443 ASSAY THYROID STIM HORMONE: CPT | Performed by: FAMILY MEDICINE

## 2025-06-04 PROCEDURE — 3078F DIAST BP <80 MM HG: CPT | Performed by: FAMILY MEDICINE

## 2025-06-04 PROCEDURE — 1125F AMNT PAIN NOTED PAIN PRSNT: CPT | Performed by: FAMILY MEDICINE

## 2025-06-04 PROCEDURE — 87340 HEPATITIS B SURFACE AG IA: CPT | Performed by: FAMILY MEDICINE

## 2025-06-04 PROCEDURE — 99395 PREV VISIT EST AGE 18-39: CPT | Performed by: FAMILY MEDICINE

## 2025-06-04 PROCEDURE — 83036 HEMOGLOBIN GLYCOSYLATED A1C: CPT | Performed by: FAMILY MEDICINE

## 2025-06-04 PROCEDURE — 82728 ASSAY OF FERRITIN: CPT | Performed by: FAMILY MEDICINE

## 2025-06-04 PROCEDURE — 3074F SYST BP LT 130 MM HG: CPT | Performed by: FAMILY MEDICINE

## 2025-06-04 PROCEDURE — 86803 HEPATITIS C AB TEST: CPT | Performed by: FAMILY MEDICINE

## 2025-06-04 PROCEDURE — 86780 TREPONEMA PALLIDUM: CPT | Performed by: FAMILY MEDICINE

## 2025-06-04 PROCEDURE — 87389 HIV-1 AG W/HIV-1&-2 AB AG IA: CPT | Performed by: FAMILY MEDICINE

## 2025-06-04 RX ORDER — DICLOFENAC SODIUM 75 MG/1
75 TABLET, DELAYED RELEASE ORAL DAILY
Qty: 15 TABLET | Refills: 0 | Status: SHIPPED | OUTPATIENT
Start: 2025-06-04

## 2025-06-04 SDOH — HEALTH STABILITY: PHYSICAL HEALTH: ON AVERAGE, HOW MANY DAYS PER WEEK DO YOU ENGAGE IN MODERATE TO STRENUOUS EXERCISE (LIKE A BRISK WALK)?: 0 DAYS

## 2025-06-04 SDOH — HEALTH STABILITY: PHYSICAL HEALTH: ON AVERAGE, HOW MANY MINUTES DO YOU ENGAGE IN EXERCISE AT THIS LEVEL?: 0 MIN

## 2025-06-04 ASSESSMENT — PAIN SCALES - GENERAL: PAINLEVEL_OUTOF10: MILD PAIN (1)

## 2025-06-04 ASSESSMENT — SOCIAL DETERMINANTS OF HEALTH (SDOH): HOW OFTEN DO YOU GET TOGETHER WITH FRIENDS OR RELATIVES?: ONCE A WEEK

## 2025-06-04 NOTE — PROGRESS NOTES
"Pre[facility] Care Visit  [facility]  Helenalorie LEYDI White MD, Family Medicine  Jun 4, 2025  {Provider  Link to [facility] :[id number]}    Assessment & Plan     Encounter for preventative adult health care examination  ***  - Hemoglobin A1c; Future  - Lipid panel reflex to direct LDL Fasting; Future  - TSH with free T4 reflex; Future  - Comprehensive metabolic panel; Future  - Ferritin; Future    Vitamin D deficiency  ***  - Vitamin D Deficiency; Future    Routine screening for STI (sexually transmitted infection)  ***  - HIV Antigen Antibody Combo Cascade  - Chlamydia trachomatis PCR  - Neisseria gonorrhoeae PCR  - Treponema Abs w Reflex to RPR and Titer  - Hepatitis B surface antigen  - Hepatitis C Screen Reflex to HCV RNA Quant and Genotype    {Patient advised of split billing (Optional):667871}        BMI  Estimated body mass index is 30.35 kg/m  as calculated from the following:    Height as of this encounter: 1.518 m (4' 11.75\").    Weight as of this encounter: 69.9 kg (154 lb 1.6 oz).   {Weight Management Plan needed for ACO:630729}    Counseling  Appropriate preventive services were addressed with this patient via screening, questionnaire, or discussion as appropriate for fall prevention, nutrition, physical activity, Tobacco-use cessation, social engagement, weight loss and cognition.  Checklist reviewing preventive services available has been given to the patient.  Reviewed patient's diet, addressing concerns and/or questions.   The patient was instructed to see the dentist every 6 months.       {Follow-up (Optional):558071}    Jaspal Harding is a 40 year old, presenting for the following:  Physical, LAB REQUEST, Referral, Palpitations, and Refill Request        6/4/2025     1:48 PM   Additional Questions   Roomed by JOY Trent   Accompanied by N/A      Lab Request: egg count, hepatic panel, routine STI testing  Pt requesting dental referral.  Pt reports \"sometimes I have heart " "palpitations\".  Refill Request: voltaren for knee pain  Patient had a cycle on 04/05/2025.   Another cycle on 05/26/2025:   [facility]  Advance Care Planning  Discussed advance care planning with patient; informed AVS has link to [employer].        6/4/2025   General Health   How would you rate your overall physical health? Good   Feel stress (tense, anxious, or unable to sleep) Not at all         6/4/2025   Nutrition   Three or more servings of calcium each day? (!) NO   Diet: Regular (no restrictions)   How many servings of fruit and vegetables per day? (!) 0-1   How many sweetened beverages each day? 0-1         6/4/2025   Exercise   Days per week of moderate/strenous exercise 0 days   Average minutes spent exercising at this level 0 min   (!) EXERCISE CONCERN      6/4/2025   Social Factors   Frequency of gathering with friends or relatives Once a week   Worry food won't last until get money to buy more No   Food not last or not have enough money for food? No   Do you have housing? (Housing is defined as stable permanent housing and does not include staying outside in a car, in a tent, in an abandoned building, in an overnight shelter, or couch-surfing.) Yes   Are you worried about losing your housing? No   Lack of transportation? No   Unable to get utilities (heat,electricity)? No         6/4/2025   Dental   Dentist two times every year? (!) NO         6/4/2025   Substance Use   Alcohol more than 3/day or more than 7/wk Not Applicable   Do you use any other substances recreationally? No     Social History     Tobacco Use    Smoking status: Never     Passive exposure: Never    Smokeless tobacco: Never   Vaping Use    Vaping status: Never Used      Mammogram Screening - Patient under 40 years of age: Routine Mammogram Screening not recommended.         6/4/2025   STI Screening   New sexual partner(s) since last STI/HIV test? (!) YES      History of abnormal Pap smear: { :544787}        Latest Ref Rng & Units " "8/14/2023     1:19 PM   PAP / HPV   PAP  Negative for Intraepithelial Lesion or Malignancy (NILM)    HPV 16 DNA Negative Negative    HPV 18 DNA Negative Negative    Other HR HPV Negative Negative            6/4/2025   Contraception/Family Planning   Questions about contraception or family planning No      Reviewed and updated as needed this visit by Provider                  Current Outpatient Medications   Medication Sig Dispense Refill    diclofenac (VOLTAREN) 75 MG EC tablet TAKE 1 TABLET BY MOUTH EVERY DAY 15 tablet 0    folic acid 800 MCG tablet Take 1,000 mcg by mouth daily.      Iron-Vitamins (GERITOL COMPLETE PO) Take by mouth.      letrozole (FEMARA) 2.5 MG tablet Take 2.5mg daily from cycle day 3-7. 15 tablet 0    Prenatal Vit-Fe Fumarate-FA (PRENATAL MULTIVITAMIN W/IRON) 27-0.8 MG tablet Take 1 tablet by mouth daily.           Review of Systems  Constitutional, neuro, ENT, endocrine, pulmonary, cardiac, gastrointestinal, genitourinary, musculoskeletal, integument and psychiatric systems are negative, except as otherwise noted.     Objective    Exam  BP 93/67 (BP Location: Left arm, Patient Position: Sitting, Cuff Size: Adult Large)   Pulse 64   Temp 98.2  F (36.8  C) (Temporal)   Resp 16   Ht 1.518 m (4' 11.75\")   Wt 69.9 kg (154 lb 1.6 oz)   LMP 05/26/2025 (Exact Date)   SpO2 99%   BMI 30.35 kg/m     Estimated body mass index is 30.35 kg/m  as calculated from the following:    Height as of this encounter: 1.518 m (4' 11.75\").    Weight as of this encounter: 69.9 kg (154 lb 1.6 oz).    Physical Exam  GENERAL: alert and no distress  EYES: Eyes grossly normal to inspection, PERRL and conjunctivae and sclerae normal  HENT: ear canals and TM's normal, nose and mouth without ulcers or lesions  NECK: no adenopathy, no asymmetry, masses, or scars  RESP: lungs clear to auscultation - no rales, rhonchi or wheezes  CV: regular rate and rhythm, normal S1 S2, no S3 or S4, no murmur, click or rub, no " peripheral edema  ABDOMEN: soft, nontender, no hepatosplenomegaly, no masses and bowel sounds normal  MS: no gross musculoskeletal defects noted, no edema  SKIN: no suspicious lesions or rashes  NEURO: Normal strength and tone, mentation intact and speech normal  PSYCH: mentation appears normal, affect normal/bright        Signed Electronically by: Vasile White MD  {Email feedback regarding this note to [email or web address]   :[id number]}

## 2025-06-05 LAB
C TRACH DNA SPEC QL NAA+PROBE: NEGATIVE
HBV SURFACE AG SERPL QL IA: NONREACTIVE
HCV AB SERPL QL IA: NONREACTIVE
N GONORRHOEA DNA SPEC QL NAA+PROBE: NEGATIVE
SPECIMEN TYPE: NORMAL
SPECIMEN TYPE: NORMAL
T PALLIDUM AB SER QL: NONREACTIVE

## 2025-06-11 ENCOUNTER — RESULTS FOLLOW-UP (OUTPATIENT)
Dept: FAMILY MEDICINE | Facility: CLINIC | Age: 40
End: 2025-06-11
Payer: COMMERCIAL

## 2025-06-19 NOTE — TELEPHONE ENCOUNTER
Pt called with the assistance of [place]  about test results.   Writer informed patient per PCP's note.   Pt verbalized understanding of the provided information.   Pt then requested appt with PCP to address concerns with intermittent numbness on hands and feet.   OV scheduled on 6/30/25 at 1700; arrival time 1640.   Pt verbalized understanding of the provided information.     Brittney BARRON RN

## 2025-06-30 ENCOUNTER — OFFICE VISIT (OUTPATIENT)
Dept: FAMILY MEDICINE | Facility: CLINIC | Age: 40
End: 2025-06-30
Payer: COMMERCIAL

## 2025-06-30 VITALS
BODY MASS INDEX: 30.94 KG/M2 | TEMPERATURE: 97.1 F | OXYGEN SATURATION: 96 % | SYSTOLIC BLOOD PRESSURE: 97 MMHG | DIASTOLIC BLOOD PRESSURE: 66 MMHG | HEIGHT: 60 IN | HEART RATE: 67 BPM | RESPIRATION RATE: 16 BRPM | WEIGHT: 157.6 LBS

## 2025-06-30 DIAGNOSIS — R20.0 NUMBNESS AND TINGLING IN LEFT HAND: Primary | ICD-10-CM

## 2025-06-30 DIAGNOSIS — R20.2 NUMBNESS AND TINGLING IN LEFT HAND: Primary | ICD-10-CM

## 2025-06-30 PROCEDURE — 3074F SYST BP LT 130 MM HG: CPT | Performed by: FAMILY MEDICINE

## 2025-06-30 PROCEDURE — 99214 OFFICE O/P EST MOD 30 MIN: CPT | Performed by: FAMILY MEDICINE

## 2025-06-30 PROCEDURE — 3078F DIAST BP <80 MM HG: CPT | Performed by: FAMILY MEDICINE

## 2025-06-30 PROCEDURE — 1126F AMNT PAIN NOTED NONE PRSNT: CPT | Performed by: FAMILY MEDICINE

## 2025-06-30 ASSESSMENT — PAIN SCALES - GENERAL: PAINLEVEL_OUTOF10: NO PAIN (0)

## 2025-06-30 NOTE — PATIENT INSTRUCTIONS
RADIOLOGY SCHEDULING (Including Mammograms, Ultrasound, CT and MRI scans and Dexa Scans):  - [facility]: [phone]  - [facility]: [phone]  - [facility]: [phone]  - [facility]: [phone]

## 2025-06-30 NOTE — PROGRESS NOTES
Assessment & Plan     Numbness and tingling in left hand  Pleasant 40-year-old who scheduled an office visit for evaluation of intermittent numbness mostly on the left side of the body.  Symptoms were present for a few years and it got worse in 2023.  Reports left upper extremity and left lower extremity intermittent numbness.  Symptoms are worse with cold weather.  Previous comprehensive evaluation did not reveal any exact cause.  In today's visit I recommended proceeding with an EMG, vitamin B12 and testing for syphilis.  If symptoms are unremarkable I would recommend proceeding with a brain CT/MRI.  - EMG; Future  - Vitamin B12; Future  - Treponema Abs w Reflex to RPR and Titer; Future          BMI  Estimated body mass index is 30.78 kg/m  as calculated from the following:    Height as of this encounter: 1.524 m (5').    Weight as of this encounter: 71.5 kg (157 lb 9.6 oz).     Jaspal Harding is a 40 year old, presenting for the following health issues:  Musculoskeletal Problem        6/30/2025     4:52 PM   Additional Questions   Roomed by Shavon ANAYA   Accompanied by n/a         6/30/2025     4:52 PM   Patient Reported Additional Medications   Patient reports taking the following new medications None     History of Present Illness       Reason for visit:  Nubness in Bilateral foot and bilateral hand   She is taking medications regularly.          Concern - Hand and feet Problem   Onset: Ongoing for few years   Description: Intermittent numbness bilateral hands and bilateral feet   Intensity: moderate  Progression of Symptoms:  intermittent  Accompanying Signs & Symptoms: None   Previous history of similar problem: Yes   Precipitating factors:        Worsened by: Cold weather, sleep    Alleviating factors:        Improved by: None   Therapies tried and outcome: None    Numbness in left hand and left leg.   Right side numbness is intermittent.   Initial episodes were rare, but now she has it 4-5 days.   Worse in the morning.     Headaches: denies any new headaches.  Occasional headaches which improved after discontinuing clomid.     Review of Systems  Constitutional, neuro, ENT, endocrine, pulmonary, cardiac, gastrointestinal, genitourinary, musculoskeletal, integument and psychiatric systems are negative, except as otherwise noted.      Objective    BP 97/66   Pulse 67   Temp 97.1  F (36.2  C) (Temporal)   Resp 16   Ht 1.524 m (5')   Wt 71.5 kg (157 lb 9.6 oz)   LMP 06/25/2025 (Approximate)   SpO2 96%   BMI 30.78 kg/m    Body mass index is 30.78 kg/m .  Physical Exam   GENERAL: alert and no distress  NECK: no adenopathy, no asymmetry, masses, or scars  RESP: lungs clear to auscultation - no rales, rhonchi or wheezes  CV: regular rate and rhythm, normal S1 S2, no S3 or S4, no murmur, click or rub, no peripheral edema  ABDOMEN: soft, nontender, no hepatosplenomegaly, no masses and bowel sounds normal  MS: no gross musculoskeletal defects noted, no edema  NEURO: Normal strength and tone, mentation intact and speech normal    No results found for any visits on 06/30/25.        Signed Electronically by: Vasile White MD

## 2025-07-07 DIAGNOSIS — E28.39 DIMINISHED OVARIAN RESERVE DUE TO LOW ANTRAL FOLLICLE: ICD-10-CM

## 2025-07-10 RX ORDER — LETROZOLE 2.5 MG/1
TABLET, FILM COATED ORAL
Qty: 10 TABLET | Refills: 0 | Status: SHIPPED | OUTPATIENT
Start: 2025-07-10

## 2025-07-10 NOTE — TELEPHONE ENCOUNTER
"Received refill request for letrozole. Pt last seen in clinic 25.    Notes from that visit state: \" Assessment/Plan:   Meaghan Garcia is a 39 year old  female here for infertility follow-up. We previously discussed how to track menses and ovulation including use of her Reddy tre, and today discussed timing of ovulation kits and intercourse. Plan to re-test progesterone 7 days after ovulation to assess for anovulatory cycle. Discussed use of prenatal vitamins and recommended daily use of 1 prenatal vitamin without additional supplementation of folic acid.   - Will switch to letrozole for next 3 cycles given side effects. Instructed patient to continue with clomid for this cycle.   - Standing progesterone order- plan to communicate results via [facility]  - If no conception within 3 months, would plan to send to [facility]. Discussed IVF and possible IUI.  - Reviewed timing of intercourse with ovulation  - Consider HSG/semen analysis, currently declines     Patient seen and discussed with Dr. Cho.     Freddy Rouse MD, MSc  [facility], PGY-1  2025 4:36 PM     I examined Meaghan Garcia with Dr. Rouse and agree with the presentation, exam and plan of care documented in this note with edits by me.   Daija Cho MD \"    Pended to ordering provider.        "